# Patient Record
Sex: FEMALE | Race: BLACK OR AFRICAN AMERICAN | Employment: FULL TIME | URBAN - METROPOLITAN AREA
[De-identification: names, ages, dates, MRNs, and addresses within clinical notes are randomized per-mention and may not be internally consistent; named-entity substitution may affect disease eponyms.]

---

## 2017-06-03 ENCOUNTER — APPOINTMENT (OUTPATIENT)
Dept: GENERAL RADIOLOGY | Facility: HOSPITAL | Age: 37
End: 2017-06-03
Attending: EMERGENCY MEDICINE
Payer: COMMERCIAL

## 2017-06-03 ENCOUNTER — HOSPITAL ENCOUNTER (EMERGENCY)
Facility: HOSPITAL | Age: 37
Discharge: HOME | End: 2017-06-03
Attending: EMERGENCY MEDICINE
Payer: COMMERCIAL

## 2017-06-03 VITALS
HEIGHT: 61 IN | OXYGEN SATURATION: 97 % | DIASTOLIC BLOOD PRESSURE: 80 MMHG | BODY MASS INDEX: 30.96 KG/M2 | WEIGHT: 164 LBS | RESPIRATION RATE: 20 BRPM | HEART RATE: 86 BPM | SYSTOLIC BLOOD PRESSURE: 125 MMHG | TEMPERATURE: 99 F

## 2017-06-03 DIAGNOSIS — J40 BRONCHITIS: Primary | ICD-10-CM

## 2017-06-03 PROCEDURE — 99283 EMERGENCY DEPT VISIT LOW MDM: CPT

## 2017-06-03 PROCEDURE — 71020 XR CHEST PA + LAT CHEST (CPT=71020): CPT | Performed by: EMERGENCY MEDICINE

## 2017-06-03 RX ORDER — ALBUTEROL SULFATE 90 UG/1
2 AEROSOL, METERED RESPIRATORY (INHALATION) EVERY 4 HOURS PRN
Qty: 1 INHALER | Refills: 0 | Status: SHIPPED | OUTPATIENT
Start: 2017-06-03 | End: 2017-07-03

## 2017-06-03 RX ORDER — AMOXICILLIN 500 MG/1
500 TABLET, FILM COATED ORAL 3 TIMES DAILY
Qty: 30 TABLET | Refills: 0 | Status: SHIPPED | OUTPATIENT
Start: 2017-06-03 | End: 2017-06-13

## 2017-06-03 RX ORDER — CODEINE PHOSPHATE AND GUAIFENESIN 10; 100 MG/5ML; MG/5ML
10 SOLUTION ORAL EVERY 6 HOURS PRN
Qty: 263 ML | Refills: 0 | Status: SHIPPED | OUTPATIENT
Start: 2017-06-03 | End: 2017-06-08

## 2017-06-03 NOTE — ED NOTES
PT VERBALIZED COUGHING FOR 3 WEEKS , + YELLOW SPUTUM, \"ON AND OFF\" CHILLS. DIDN'T CHECK FOR FEVERS. PT TOOK MUSINEX AT 1700. DENIES NAUSEA OR VOMITING.

## 2017-06-03 NOTE — ED PROVIDER NOTES
Patient Seen in: [facility] AND [facility] Emergency Department    History   Patient presents with:  Cough/URI    Stated Complaint: Cough    HPI    3 weeks of productive cough. No fever but she has had chills. She is also feeling short of breath.   No relief wi Resp 20  Ht 154.9 cm (5' 1\")  Wt 74.39 kg  BMI 31.00 kg/m2  SpO2 97%  LMP 06/02/2017        Physical Exam   Constitutional: She is oriented to person, place, and time. She appears well-developed and well-nourished. No distress.    HENT:   Head: Normocephal 0    guaiFENesin-codeine (CHERATUSSIN AC) 100-10 MG/5ML Oral Solution  Take 10 mL by mouth every 6 (six) hours as needed for cough.   Qty: 263 mL Refills: 0

## 2018-04-15 ENCOUNTER — HOSPITAL ENCOUNTER (EMERGENCY)
Facility: HOSPITAL | Age: 38
Discharge: HOME | End: 2018-04-15
Attending: EMERGENCY MEDICINE
Payer: COMMERCIAL

## 2018-04-15 VITALS
DIASTOLIC BLOOD PRESSURE: 79 MMHG | TEMPERATURE: 98 F | SYSTOLIC BLOOD PRESSURE: 118 MMHG | WEIGHT: 164 LBS | HEIGHT: 61 IN | RESPIRATION RATE: 18 BRPM | BODY MASS INDEX: 30.96 KG/M2 | OXYGEN SATURATION: 100 % | HEART RATE: 84 BPM

## 2018-04-15 DIAGNOSIS — M79.10 MYALGIA: Primary | ICD-10-CM

## 2018-04-15 PROCEDURE — 99282 EMERGENCY DEPT VISIT SF MDM: CPT

## 2018-04-15 RX ORDER — IBUPROFEN 600 MG/1
600 TABLET ORAL EVERY 8 HOURS PRN
Qty: 30 TABLET | Refills: 0 | Status: SHIPPED | OUTPATIENT
Start: 2018-04-15 | End: 2018-04-22

## 2018-04-15 NOTE — ED NOTES
Patient reports all over body aching x3 weeks with no injury. Patient states this has been happening to her intermittently for a few years, she will get achy for weeks then it abruptly stops on its own without treatment.  Patient has never been worked up fo

## 2018-04-15 NOTE — ED PROVIDER NOTES
Patient Seen in: [facility] Emergency Department    History   Patient presents with:   Body ache and/or chills    Stated Complaint: Whole body aches x 3 weeks, no other complaints or symptoms    HPI    44year old female complains of years of episode Temp 98.1 °F (36.7 °C)   Resp 18   Ht 154.9 cm (5' 1\")   Wt 74.4 kg   SpO2 100%   BMI 30.99 kg/m²         Physical Exam   Constitutional: She is oriented to person, place, and time. She is cooperative. Non-toxic appearance.  She does not have a sickly shona 154.9 cm (5' 1\")     *I personally reviewed and interpreted all vitals.     MDM     Family history non contributory    Impression:  After history, physical and any above workup, the patient was found to have Myalgia  (primary encounter diagnosis)    Plan:

## 2018-07-26 ENCOUNTER — OFFICE VISIT (OUTPATIENT)
Dept: INTERNAL MEDICINE CLINIC | Facility: CLINIC | Age: 38
End: 2018-07-26
Payer: COMMERCIAL

## 2018-07-26 ENCOUNTER — HOSPITAL ENCOUNTER (OUTPATIENT)
Dept: GENERAL RADIOLOGY | Facility: HOSPITAL | Age: 38
Discharge: HOME | End: 2018-07-26
Attending: INTERNAL MEDICINE
Payer: COMMERCIAL

## 2018-07-26 VITALS
DIASTOLIC BLOOD PRESSURE: 82 MMHG | HEIGHT: 62 IN | TEMPERATURE: 99 F | BODY MASS INDEX: 30 KG/M2 | RESPIRATION RATE: 17 BRPM | SYSTOLIC BLOOD PRESSURE: 120 MMHG | HEART RATE: 86 BPM | OXYGEN SATURATION: 99 % | WEIGHT: 163 LBS

## 2018-07-26 DIAGNOSIS — M54.41 CHRONIC BILATERAL LOW BACK PAIN WITH BILATERAL SCIATICA: ICD-10-CM

## 2018-07-26 DIAGNOSIS — K21.9 GASTROESOPHAGEAL REFLUX DISEASE, ESOPHAGITIS PRESENCE NOT SPECIFIED: ICD-10-CM

## 2018-07-26 DIAGNOSIS — Z00.00 ROUTINE GENERAL MEDICAL EXAMINATION AT A HEALTH CARE FACILITY: Primary | ICD-10-CM

## 2018-07-26 DIAGNOSIS — E66.3 OVERWEIGHT (BMI 25.0-29.9): ICD-10-CM

## 2018-07-26 DIAGNOSIS — M54.42 CHRONIC BILATERAL LOW BACK PAIN WITH BILATERAL SCIATICA: ICD-10-CM

## 2018-07-26 DIAGNOSIS — G89.29 CHRONIC BILATERAL LOW BACK PAIN WITH BILATERAL SCIATICA: ICD-10-CM

## 2018-07-26 DIAGNOSIS — M25.50 DIFFUSE ARTHRALGIA: ICD-10-CM

## 2018-07-26 DIAGNOSIS — M25.50 ARTHRALGIA, UNSPECIFIED JOINT: ICD-10-CM

## 2018-07-26 LAB
ALBUMIN SERPL BCP-MCNC: 4.1 G/DL (ref 3.5–4.8)
ALBUMIN/GLOB SERPL: 1.1 {RATIO} (ref 1–2)
ALP SERPL-CCNC: 79 U/L (ref 32–100)
ALT SERPL-CCNC: 14 U/L (ref 14–54)
ANION GAP SERPL CALC-SCNC: 8 MMOL/L (ref 0–18)
APPEARANCE: CLEAR
AST SERPL-CCNC: 18 U/L (ref 15–41)
BASOPHILS # BLD: 0.1 K/UL (ref 0–0.2)
BASOPHILS NFR BLD: 1 %
BILIRUB SERPL-MCNC: 0.4 MG/DL (ref 0.3–1.2)
BILIRUBIN: NEGATIVE
BUN SERPL-MCNC: 8 MG/DL (ref 8–20)
BUN/CREAT SERPL: 10.1 (ref 10–20)
CALCIUM SERPL-MCNC: 9.3 MG/DL (ref 8.5–10.5)
CHLORIDE SERPL-SCNC: 106 MMOL/L (ref 95–110)
CK SERPL-CCNC: 115 U/L (ref 38–234)
CO2 SERPL-SCNC: 23 MMOL/L (ref 22–32)
CREAT SERPL-MCNC: 0.79 MG/DL (ref 0.5–1.5)
EOSINOPHIL # BLD: 0.1 K/UL (ref 0–0.7)
EOSINOPHIL NFR BLD: 1 %
ERYTHROCYTE [DISTWIDTH] IN BLOOD BY AUTOMATED COUNT: 13.8 % (ref 11–15)
ERYTHROCYTE [SEDIMENTATION RATE] IN BLOOD: 22 MM/HR (ref 0–20)
GLOBULIN PLAS-MCNC: 3.9 G/DL (ref 2.5–3.7)
GLUCOSE (URINE DIPSTICK): NEGATIVE MG/DL
GLUCOSE SERPL-MCNC: 80 MG/DL (ref 70–99)
HCT VFR BLD AUTO: 39.2 % (ref 35–48)
HGB BLD-MCNC: 12.6 G/DL (ref 12–16)
KETONES (URINE DIPSTICK): NEGATIVE MG/DL
LEUKOCYTES: NEGATIVE
LYMPHOCYTES # BLD: 2.1 K/UL (ref 1–4)
LYMPHOCYTES NFR BLD: 29 %
MCH RBC QN AUTO: 25.9 PG (ref 27–32)
MCHC RBC AUTO-ENTMCNC: 32.2 G/DL (ref 32–37)
MCV RBC AUTO: 80.4 FL (ref 80–100)
MONOCYTES # BLD: 0.5 K/UL (ref 0–1)
MONOCYTES NFR BLD: 7 %
NEUTROPHILS # BLD AUTO: 4.4 K/UL (ref 1.8–7.7)
NEUTROPHILS NFR BLD: 62 %
NITRITE, URINE: NEGATIVE
OCCULT BLOOD: NEGATIVE
OSMOLALITY UR CALC.SUM OF ELEC: 281 MOSM/KG (ref 275–295)
PATIENT FASTING: NO
PH, URINE: 6 (ref 4.5–8)
PLATELET # BLD AUTO: 357 K/UL (ref 140–400)
PMV BLD AUTO: 7.8 FL (ref 7.4–10.3)
POTASSIUM SERPL-SCNC: 3.8 MMOL/L (ref 3.3–5.1)
PROT SERPL-MCNC: 8 G/DL (ref 5.9–8.4)
PROTEIN (URINE DIPSTICK): NEGATIVE MG/DL
RBC # BLD AUTO: 4.88 M/UL (ref 3.7–5.4)
RHEUMATOID FACT SER QL: <5 IU/ML
SODIUM SERPL-SCNC: 137 MMOL/L (ref 136–144)
SPECIFIC GRAVITY: 1.02 (ref 1–1.03)
TSH SERPL-ACNC: 0.86 UIU/ML (ref 0.45–5.33)
URINE-COLOR: YELLOW
UROBILINOGEN,SEMI-QN: 0.2 MG/DL (ref 0–1.9)
VIT B12 SERPL-MCNC: 449 PG/ML (ref 181–914)
WBC # BLD AUTO: 7.2 K/UL (ref 4–11)

## 2018-07-26 PROCEDURE — 86431 RHEUMATOID FACTOR QUANT: CPT | Performed by: INTERNAL MEDICINE

## 2018-07-26 PROCEDURE — 82550 ASSAY OF CK (CPK): CPT | Performed by: INTERNAL MEDICINE

## 2018-07-26 PROCEDURE — 72100 X-RAY EXAM L-S SPINE 2/3 VWS: CPT | Performed by: INTERNAL MEDICINE

## 2018-07-26 PROCEDURE — 85652 RBC SED RATE AUTOMATED: CPT | Performed by: INTERNAL MEDICINE

## 2018-07-26 PROCEDURE — 82306 VITAMIN D 25 HYDROXY: CPT | Performed by: INTERNAL MEDICINE

## 2018-07-26 PROCEDURE — 36415 COLL VENOUS BLD VENIPUNCTURE: CPT | Performed by: INTERNAL MEDICINE

## 2018-07-26 PROCEDURE — 80050 GENERAL HEALTH PANEL: CPT | Performed by: INTERNAL MEDICINE

## 2018-07-26 PROCEDURE — 86038 ANTINUCLEAR ANTIBODIES: CPT | Performed by: INTERNAL MEDICINE

## 2018-07-26 PROCEDURE — 81000 URINALYSIS NONAUTO W/SCOPE: CPT | Performed by: INTERNAL MEDICINE

## 2018-07-26 PROCEDURE — 82607 VITAMIN B-12: CPT | Performed by: INTERNAL MEDICINE

## 2018-07-26 PROCEDURE — 99395 PREV VISIT EST AGE 18-39: CPT | Performed by: INTERNAL MEDICINE

## 2018-07-26 RX ORDER — MEDROXYPROGESTERONE ACETATE 150 MG/ML
150 INJECTION, SUSPENSION INTRAMUSCULAR
COMMUNITY
End: 2020-12-23

## 2018-07-26 RX ORDER — CELECOXIB 200 MG/1
200 CAPSULE ORAL DAILY
Qty: 10 CAPSULE | Refills: 0 | Status: SHIPPED | OUTPATIENT
Start: 2018-07-26 | End: 2018-12-15 | Stop reason: ALTCHOICE

## 2018-07-26 RX ORDER — FAMOTIDINE 40 MG/1
40 TABLET, FILM COATED ORAL DAILY
Qty: 30 TABLET | Refills: 0 | Status: SHIPPED | OUTPATIENT
Start: 2018-07-26 | End: 2018-12-15 | Stop reason: ALTCHOICE

## 2018-07-26 NOTE — PROGRESS NOTES
Nadia Ospina is a 43year old female. Patient presents with:  Physical: pt presents to clinic for Annual physical. last pap smear nov 2017 (Normal)       HPI:          Achiness whole body with aching last 3 mo. Had this achiness years tho on and off. Alcohol use: No                   Review of System:  CONSTITUTION: denies fevers,  chills, or sweats  HEENT: denies sore throat,  change in vision or hearing  CARDIOVASCULAR: denies chest pain, denies palpitations  RESPIRATORY: As HPI.   No congestion ARTHRITIS FACTOR, XR LUMBAR SPINE         (MIN 2 VIEWS) (CPT=72100), CK CREATINE KINASE         (NOT CREATININE), JEEVAN,DIRECT,REFLEX TITER +         SPECIFIC ANTIBODIES, CBC WITH DIFFERENTIAL WITH        PLATELET, COMP METABOLIC PANEL (14), ASSAY,         T

## 2018-07-26 NOTE — PROGRESS NOTES
Pt presented to clinic today for blood draw. Per physician able to draw orders. Orders  documented within chart. Pt tolerated lab draw well.  verified.   Orders drawn include: sed rate, rheumatoid, vit d, vit b12, tsh, cmp, cbc, sumanth, ck   Site of draw: r

## 2018-07-27 LAB — 25(OH)D3 SERPL-MCNC: 23.7 NG/ML

## 2018-07-30 LAB — NUCLEAR IGG TITR SER IF: NEGATIVE {TITER}

## 2018-08-01 ENCOUNTER — OFFICE VISIT (OUTPATIENT)
Dept: INTERNAL MEDICINE CLINIC | Facility: CLINIC | Age: 38
End: 2018-08-01
Payer: COMMERCIAL

## 2018-08-01 VITALS
HEART RATE: 85 BPM | WEIGHT: 164 LBS | BODY MASS INDEX: 30 KG/M2 | RESPIRATION RATE: 17 BRPM | DIASTOLIC BLOOD PRESSURE: 80 MMHG | SYSTOLIC BLOOD PRESSURE: 108 MMHG | TEMPERATURE: 99 F | OXYGEN SATURATION: 99 %

## 2018-08-01 DIAGNOSIS — M54.42 CHRONIC BILATERAL LOW BACK PAIN WITH BILATERAL SCIATICA: Primary | ICD-10-CM

## 2018-08-01 DIAGNOSIS — J45.20 MILD INTERMITTENT ASTHMA WITHOUT COMPLICATION: ICD-10-CM

## 2018-08-01 DIAGNOSIS — K21.9 GASTROESOPHAGEAL REFLUX DISEASE, ESOPHAGITIS PRESENCE NOT SPECIFIED: ICD-10-CM

## 2018-08-01 DIAGNOSIS — M25.50 DIFFUSE ARTHRALGIA: ICD-10-CM

## 2018-08-01 DIAGNOSIS — G89.29 CHRONIC BILATERAL LOW BACK PAIN WITH BILATERAL SCIATICA: Primary | ICD-10-CM

## 2018-08-01 DIAGNOSIS — R29.898 WEAKNESS OF FOOT, UNSPECIFIED LATERALITY: ICD-10-CM

## 2018-08-01 DIAGNOSIS — M54.41 CHRONIC BILATERAL LOW BACK PAIN WITH BILATERAL SCIATICA: Primary | ICD-10-CM

## 2018-08-01 DIAGNOSIS — E55.9 VITAMIN D DEFICIENCY: ICD-10-CM

## 2018-08-01 DIAGNOSIS — Z80.0 FAMILY HISTORY OF COLON CANCER: ICD-10-CM

## 2018-08-01 DIAGNOSIS — E04.9 THYROID ENLARGEMENT: ICD-10-CM

## 2018-08-01 DIAGNOSIS — R06.00 DYSPNEA ON EXERTION: ICD-10-CM

## 2018-08-01 PROCEDURE — 99214 OFFICE O/P EST MOD 30 MIN: CPT | Performed by: INTERNAL MEDICINE

## 2018-08-01 RX ORDER — TRAMADOL HYDROCHLORIDE 50 MG/1
25 TABLET ORAL NIGHTLY PRN
Qty: 7 TABLET | Refills: 0 | Status: SHIPPED | OUTPATIENT
Start: 2018-08-01 | End: 2018-12-15 | Stop reason: ALTCHOICE

## 2018-08-01 RX ORDER — ALBUTEROL SULFATE 90 UG/1
2 AEROSOL, METERED RESPIRATORY (INHALATION) EVERY 4 HOURS PRN
Qty: 1 INHALER | Refills: 3 | Status: SHIPPED | OUTPATIENT
Start: 2018-08-01

## 2018-08-01 RX ORDER — MEDROXYPROGESTERONE ACETATE 150 MG/ML
1 INJECTION, SUSPENSION INTRAMUSCULAR
COMMUNITY
End: 2018-08-01 | Stop reason: ALTCHOICE

## 2018-08-01 RX ORDER — TRAMADOL HYDROCHLORIDE 50 MG/1
25 TABLET ORAL EVERY 6 HOURS PRN
Qty: 7 TABLET | Refills: 0 | Status: SHIPPED | OUTPATIENT
Start: 2018-08-01 | End: 2018-08-01 | Stop reason: CLARIF

## 2018-08-01 NOTE — PATIENT INSTRUCTIONS
Eat 3 meals/day; include protein snacks between meals, avoid excessive carbs and sweets    Vitamin D3 2000 IU  3 times weekly  ASTHMA ACTION PLAN for Bita Altman     : 1980          Date: 2018    Jess Jovel MD  [address] [place] [address] if necessary) and a copy of the plan was given to the patient/caregiver. [] Asthma Action Plan reviewed with patient (and caregiver if necessary) on the phone and mailed copy to patient.          Physician Patient Caretaker (if necessary)   Sonja Coleman

## 2018-08-01 NOTE — PROGRESS NOTES
Glinda Apley is a 43year old female. Patient presents with:  Test Results: pt presents to clinic for follow up on test results       HPI:       ONLY ABLE TO  Take 1 Celebrex because even with first pill, it caused cramping of stomach.     Diffuse  Monika    Family History   Problem Relation Age of Onset   • Diabetes Mother    • hepatitis [OTHER] Mother      hepatitis C   • Diabetes Maternal Grandmother    • Heart Disorder Maternal Grandmother 40     atrial fibrillation, heart problems in cousins, strength grossly normal except bilateral weakness of feet noted, both dorsiflexion and plantarflexion.     Objectives:    Results for orders placed or performed in visit on 07/26/18  -CK CREATINE KINASE (NOT CREATININE)   Result Value Ref Range    38 Negative   LEUKOCYTES negative Negative   APPEARANCE clear Clear   URINE-COLOR yellow Yellow   PROTEIN (URINE DIPSTICK)  Negative/Trace mg/dL   BILIRUBIN  Negative   UROBILINOGEN,SEMI-QN  0.0 - 1.9 mg/dL   PH, URINE  4.5 - 8.0   SPECIFIC GRAVITY  1.005 - 1 radiating from her back to buttocks down to her legs.   To check MRI of lumbar spine    (M21.40) Weakness of foot, unspecified laterality  Comment: bilateral  Plan: MRI SPINE LUMBAR (CPT=72148)  Patient has history heavy lifting    (R06.09) Dyspnea on exert for Pain. Patient Instructions   Eat 3 meals/day; include protein snacks between meals, avoid excessive carbs and sweets    Vitamin D3 2000 IU  3 times weekly      Return in about 2 weeks (around 8/15/2018).         Becky Zuniga MD

## 2018-08-14 ENCOUNTER — HOSPITAL ENCOUNTER (OUTPATIENT)
Dept: ULTRASOUND IMAGING | Facility: HOSPITAL | Age: 38
Discharge: HOME | End: 2018-08-14
Attending: INTERNAL MEDICINE
Payer: COMMERCIAL

## 2018-08-14 ENCOUNTER — TELEPHONE (OUTPATIENT)
Dept: INTERNAL MEDICINE CLINIC | Facility: CLINIC | Age: 38
End: 2018-08-14

## 2018-08-14 ENCOUNTER — APPOINTMENT (OUTPATIENT)
Dept: LAB | Facility: HOSPITAL | Age: 38
End: 2018-08-14
Attending: INTERNAL MEDICINE
Payer: COMMERCIAL

## 2018-08-14 ENCOUNTER — HOSPITAL ENCOUNTER (OUTPATIENT)
Dept: MRI IMAGING | Facility: HOSPITAL | Age: 38
Discharge: HOME | End: 2018-08-14
Attending: INTERNAL MEDICINE
Payer: COMMERCIAL

## 2018-08-14 DIAGNOSIS — M79.10 MYALGIA: Primary | ICD-10-CM

## 2018-08-14 DIAGNOSIS — R29.898 WEAKNESS OF FOOT, UNSPECIFIED LATERALITY: ICD-10-CM

## 2018-08-14 DIAGNOSIS — R06.00 DYSPNEA ON EXERTION: ICD-10-CM

## 2018-08-14 DIAGNOSIS — E04.9 THYROID ENLARGEMENT: ICD-10-CM

## 2018-08-14 PROCEDURE — 93005 ELECTROCARDIOGRAM TRACING: CPT

## 2018-08-14 PROCEDURE — 72148 MRI LUMBAR SPINE W/O DYE: CPT | Performed by: INTERNAL MEDICINE

## 2018-08-14 PROCEDURE — 76536 US EXAM OF HEAD AND NECK: CPT | Performed by: INTERNAL MEDICINE

## 2018-08-14 PROCEDURE — 93010 ELECTROCARDIOGRAM REPORT: CPT | Performed by: INTERNAL MEDICINE

## 2018-08-18 ENCOUNTER — OFFICE VISIT (OUTPATIENT)
Dept: INTERNAL MEDICINE CLINIC | Facility: CLINIC | Age: 38
End: 2018-08-18
Payer: COMMERCIAL

## 2018-08-18 ENCOUNTER — TELEPHONE (OUTPATIENT)
Dept: INTERNAL MEDICINE CLINIC | Facility: CLINIC | Age: 38
End: 2018-08-18

## 2018-08-18 VITALS
SYSTOLIC BLOOD PRESSURE: 110 MMHG | DIASTOLIC BLOOD PRESSURE: 70 MMHG | HEART RATE: 80 BPM | OXYGEN SATURATION: 99 % | HEIGHT: 62 IN | RESPIRATION RATE: 18 BRPM | BODY MASS INDEX: 30 KG/M2 | TEMPERATURE: 98 F | WEIGHT: 163 LBS

## 2018-08-18 DIAGNOSIS — K21.9 GASTROESOPHAGEAL REFLUX DISEASE, ESOPHAGITIS PRESENCE NOT SPECIFIED: ICD-10-CM

## 2018-08-18 DIAGNOSIS — M54.16 LUMBAR RADICULOPATHY: Primary | ICD-10-CM

## 2018-08-18 DIAGNOSIS — M79.10 MYALGIA: Primary | ICD-10-CM

## 2018-08-18 DIAGNOSIS — M79.10 MYALGIA: ICD-10-CM

## 2018-08-18 DIAGNOSIS — E04.1 THYROID NODULE: ICD-10-CM

## 2018-08-18 DIAGNOSIS — E66.3 OVERWEIGHT (BMI 25.0-29.9): ICD-10-CM

## 2018-08-18 DIAGNOSIS — E55.9 VITAMIN D DEFICIENCY: ICD-10-CM

## 2018-08-18 LAB
APPEARANCE: CLEAR
APPEARANCE: CLEAR
BILIRUBIN: NEGATIVE
BILIRUBIN: NEGATIVE
GLUCOSE (URINE DIPSTICK): NEGATIVE MG/DL
KETONES (URINE DIPSTICK): NEGATIVE MG/DL
KETONES (URINE DIPSTICK): NEGATIVE MG/DL
NITRITE, URINE: NEGATIVE
NITRITE, URINE: NEGATIVE
OCCULT BLOOD: NEGATIVE
OCCULT BLOOD: NEGATIVE
PH, URINE: 5 (ref 4.5–8)
PH, URINE: 5 (ref 4.5–8)
PROTEIN (URINE DIPSTICK): NEGATIVE MG/DL
PROTEIN (URINE DIPSTICK): NEGATIVE MG/DL
SPECIFIC GRAVITY: 1 (ref 1–1.03)
SPECIFIC GRAVITY: 1 (ref 1–1.03)
URINE-COLOR: YELLOW
URINE-COLOR: YELLOW
UROBILINOGEN,SEMI-QN: 0.2 MG/DL (ref 0–1.9)
UROBILINOGEN,SEMI-QN: 0.2 MG/DL (ref 0–1.9)

## 2018-08-18 PROCEDURE — 87389 HIV-1 AG W/HIV-1&-2 AB AG IA: CPT | Performed by: INTERNAL MEDICINE

## 2018-08-18 PROCEDURE — 99214 OFFICE O/P EST MOD 30 MIN: CPT | Performed by: INTERNAL MEDICINE

## 2018-08-18 PROCEDURE — 86618 LYME DISEASE ANTIBODY: CPT | Performed by: INTERNAL MEDICINE

## 2018-08-18 PROCEDURE — 81000 URINALYSIS NONAUTO W/SCOPE: CPT | Performed by: INTERNAL MEDICINE

## 2018-08-18 PROCEDURE — 82306 VITAMIN D 25 HYDROXY: CPT | Performed by: INTERNAL MEDICINE

## 2018-08-18 PROCEDURE — 36415 COLL VENOUS BLD VENIPUNCTURE: CPT | Performed by: INTERNAL MEDICINE

## 2018-08-18 RX ORDER — HYDROCODONE BITARTRATE AND ACETAMINOPHEN 5; 325 MG/1; MG/1
1 TABLET ORAL EVERY 6 HOURS PRN
Qty: 7 TABLET | Refills: 0 | Status: SHIPPED | OUTPATIENT
Start: 2018-08-18 | End: 2018-09-29

## 2018-08-18 NOTE — PROGRESS NOTES
Pearl Perales is a 43year old female. Patient presents with: Follow - Up: pt presents to clinic to discuss MRI, US and xray       HPI:        Tramadol causes her to awaken 3-4 hours after going to sleep. After, can't go back to sleep.  New since Tram Disorder Maternal Grandmother 40     atrial fibrillation, heart problems in cousins, some who smoke   • hepatitis C [OTHER] Maternal Grandmother    • Cancer Other 36     breast cancer   • breast cancer [OTHER] Other      40's   • colon cancer [OTHER] Other GRAVITY 1.005 1.005 - 1.030   OCCULT BLOOD negative Negative   PH, URINE 5.0 4.5 - 8.0   PROTEIN (URINE DIPSTICK) negative Negative/Trace mg/dL   UROBILINOGEN,SEMI-QN 0.2 0.0 - 1.9 mg/dL   NITRITE, URINE negative Negative   LEUKOCYTES trace Negative   APPE abnormality, spinal stenosis, or foraminal stenosis. CONCLUSION:  1. Mild levoscoliosis and minimal lumbar spondylosis. 2. L3-4: Slight asymmetric disc bulge with far left lateral foraminal encroachment 3.  No other significant disc bulge or herniatio well-circumscribed ovoid 1.7 x 0.7 x 1.1 cm lymph node. CONCLUSION:  1. There is a 1.9 cm left paraisthmic hypoechoic solid nodule. This meet size criteria for biopsy, and correlation with fine needle aspiration is recommended.   2. A 1.7 cm left-side Tab 7 tablet 0      Sig: Take 1 tablet by mouth every 6 (six) hours as needed for Pain.  1/2 tablet at night  PRN severe painDo  nnot take nightlyMay take ectra tylenol or Aleve with this (with food)           Patient Instructions   F/U gyne      F/U after

## 2018-08-18 NOTE — PROGRESS NOTES
Pt presented to clinic today for blood draw. Per physician able to draw orders. Orders  documented within chart. Pt tolerated lab draw well.  verified.   Orders drawn include: vit d, hiv, lyme   Site of draw: rt heidi Del Rosario, CMA

## 2018-08-19 PROBLEM — E04.1 THYROID NODULE: Status: ACTIVE | Noted: 2018-08-19

## 2018-08-19 PROBLEM — M54.16 LUMBAR RADICULOPATHY: Status: ACTIVE | Noted: 2018-08-19

## 2018-08-20 LAB
25(OH)D3 SERPL-MCNC: 22.1 NG/ML
B BURGDOR IGG+IGM SER QL: NEGATIVE
HIV1+2 AB SERPL QL IA: NONREACTIVE

## 2018-08-21 ENCOUNTER — TELEPHONE (OUTPATIENT)
Dept: INTERNAL MEDICINE CLINIC | Facility: CLINIC | Age: 38
End: 2018-08-21

## 2018-08-21 NOTE — TELEPHONE ENCOUNTER
Patient called asking if office can just fax the [facility] prescription that she had forgotten on Saturday. Informed patient that this is one of those controlled substances that we cannot fax over and she needs to come and pick it up.   Patient asked what time

## 2018-08-28 ENCOUNTER — TELEPHONE (OUTPATIENT)
Dept: INTERNAL MEDICINE CLINIC | Facility: CLINIC | Age: 38
End: 2018-08-28

## 2018-08-28 DIAGNOSIS — E04.1 THYROID NODULE: Primary | ICD-10-CM

## 2018-08-28 NOTE — TELEPHONE ENCOUNTER
José Miguel Dotson from [address] [place] [address] scheduling called re: the referral for interventional radiology. States that it doesn't indicate \"biopsy order for thyroid nodule. \"  Please addend or create new order.   They do not know exactly what to do as it doesn't state in the ord

## 2018-08-30 ENCOUNTER — TELEPHONE (OUTPATIENT)
Dept: INTERNAL MEDICINE CLINIC | Facility: CLINIC | Age: 38
End: 2018-08-30

## 2018-08-31 ENCOUNTER — EXTERNAL RECORD (OUTPATIENT)
Dept: HEALTH INFORMATION MANAGEMENT | Facility: OTHER | Age: 38
End: 2018-08-31

## 2018-09-12 ENCOUNTER — HOSPITAL ENCOUNTER (OUTPATIENT)
Dept: ULTRASOUND IMAGING | Facility: HOSPITAL | Age: 38
Discharge: HOME | End: 2018-09-12
Attending: INTERNAL MEDICINE
Payer: COMMERCIAL

## 2018-09-12 VITALS
DIASTOLIC BLOOD PRESSURE: 88 MMHG | SYSTOLIC BLOOD PRESSURE: 130 MMHG | HEIGHT: 61 IN | HEART RATE: 75 BPM | WEIGHT: 165 LBS | BODY MASS INDEX: 31.15 KG/M2

## 2018-09-12 DIAGNOSIS — E04.1 THYROID NODULE: ICD-10-CM

## 2018-09-12 PROCEDURE — 88173 CYTOPATH EVAL FNA REPORT: CPT | Performed by: INTERNAL MEDICINE

## 2018-09-12 PROCEDURE — 76942 ECHO GUIDE FOR BIOPSY: CPT | Performed by: INTERNAL MEDICINE

## 2018-09-12 PROCEDURE — 88177 CYTP FNA EVAL EA ADDL: CPT | Performed by: INTERNAL MEDICINE

## 2018-09-12 PROCEDURE — 10022 US FNA THYROID (CPT=10022/76942): CPT | Performed by: INTERNAL MEDICINE

## 2018-09-12 PROCEDURE — 88172 CYTP DX EVAL FNA 1ST EA SITE: CPT | Performed by: INTERNAL MEDICINE

## 2018-09-12 NOTE — IMAGING NOTE
1000:  Miss Nichole Jason arrived to ultrasound room #4     1005:  Scans completed by ultrasound technologist,  Lala DRUMMOND TAKEN : Miss Nichole Jason reported that primary care physician discovered thyroid nodule during routine exam 8-19-18.   Denies smoking  Fam

## 2018-09-12 NOTE — PROCEDURES
[facility]  Procedure Note    Jolieraulito Robles Patient Status:  Outpatient    1980 MRN [id number]   Location [address] [facility] Attending Letha Bernal MD   Hosp Day # 0 PCP Frances Arellano MD     Proc

## 2018-09-14 ENCOUNTER — TELEPHONE (OUTPATIENT)
Dept: INTERNAL MEDICINE CLINIC | Facility: CLINIC | Age: 38
End: 2018-09-14

## 2018-09-14 NOTE — TELEPHONE ENCOUNTER
Patient called office-stated someone told her to repeat a test, although she did not understand. She had a thyroid biopsy, which was resulted today, and 9/14/2018. Advised her that biopsy results were benign.   I did not call her, possibly pathology bernabe

## 2018-09-29 ENCOUNTER — OFFICE VISIT (OUTPATIENT)
Dept: INTERNAL MEDICINE CLINIC | Facility: CLINIC | Age: 38
End: 2018-09-29
Payer: COMMERCIAL

## 2018-09-29 VITALS
BODY MASS INDEX: 31 KG/M2 | OXYGEN SATURATION: 99 % | RESPIRATION RATE: 17 BRPM | WEIGHT: 165 LBS | HEART RATE: 88 BPM | TEMPERATURE: 98 F | DIASTOLIC BLOOD PRESSURE: 80 MMHG | SYSTOLIC BLOOD PRESSURE: 110 MMHG

## 2018-09-29 DIAGNOSIS — E66.9 OBESITY (BMI 30.0-34.9): ICD-10-CM

## 2018-09-29 DIAGNOSIS — E55.9 VITAMIN D DEFICIENCY: ICD-10-CM

## 2018-09-29 DIAGNOSIS — E04.1 THYROID NODULE: ICD-10-CM

## 2018-09-29 DIAGNOSIS — R10.819 LEFT FLANK TENDERNESS: ICD-10-CM

## 2018-09-29 DIAGNOSIS — M54.16 LUMBAR RADICULOPATHY: Primary | ICD-10-CM

## 2018-09-29 PROCEDURE — 99214 OFFICE O/P EST MOD 30 MIN: CPT | Performed by: INTERNAL MEDICINE

## 2018-09-29 RX ORDER — HYDROCODONE BITARTRATE AND ACETAMINOPHEN 5; 325 MG/1; MG/1
0.5 TABLET ORAL NIGHTLY PRN
Qty: 7 TABLET | Refills: 0 | Status: SHIPPED | OUTPATIENT
Start: 2018-09-29 | End: 2018-12-15 | Stop reason: ALTCHOICE

## 2018-09-29 NOTE — PROGRESS NOTES
Lan Herrera is a 43year old female. Patient presents with: Follow - Up: pt presents to clinic for 1 month follow up       HPI:       Had thyroid biopsy-benign. Previously discussed this per phone with patient. Some days back better than others.  O Onset   • Diabetes Mother    • Other (hepatitis) Mother         hepatitis C   • Diabetes Maternal Grandmother    • Heart Disorder Maternal Grandmother 40        atrial fibrillation, heart problems in cousins, some who smoke   • Other (hepatitis C) Maternal [facility] Received:            09/12/2018 10:57 AM                                 Ultrasound                                                                   Pathologist:           [place] Rodas MD DESCRIPTION: The patient was informed of the nature of the procedure which included a discussion of the benefits and risks including, but not limited to, hemorrhage and infection.   After obtaining informed consent, an ultrasound fine needle aspiration biop showed mild left symmetric disc bulge L3-L4, with left lateral foraminal encroachment.   Patient advised physical therapy, though wishes to see neurology first.  She requested a few Norco.  Discussed addictive potential, and told her that I would give her a

## 2018-11-30 ENCOUNTER — APPOINTMENT (OUTPATIENT)
Dept: GENERAL RADIOLOGY | Facility: HOSPITAL | Age: 38
End: 2018-11-30
Payer: COMMERCIAL

## 2018-11-30 ENCOUNTER — HOSPITAL ENCOUNTER (EMERGENCY)
Facility: HOSPITAL | Age: 38
Discharge: HOME | End: 2018-11-30
Payer: COMMERCIAL

## 2018-11-30 VITALS
HEIGHT: 61 IN | HEART RATE: 97 BPM | RESPIRATION RATE: 18 BRPM | SYSTOLIC BLOOD PRESSURE: 135 MMHG | TEMPERATURE: 99 F | OXYGEN SATURATION: 97 % | WEIGHT: 165 LBS | BODY MASS INDEX: 31.15 KG/M2 | DIASTOLIC BLOOD PRESSURE: 76 MMHG

## 2018-11-30 DIAGNOSIS — S80.02XA CONTUSION OF LEFT KNEE, INITIAL ENCOUNTER: Primary | ICD-10-CM

## 2018-11-30 PROCEDURE — 73560 X-RAY EXAM OF KNEE 1 OR 2: CPT

## 2018-11-30 PROCEDURE — 99283 EMERGENCY DEPT VISIT LOW MDM: CPT

## 2018-11-30 RX ORDER — IBUPROFEN 600 MG/1
600 TABLET ORAL ONCE
Status: COMPLETED | OUTPATIENT
Start: 2018-11-30 | End: 2018-11-30

## 2018-11-30 NOTE — ED PROVIDER NOTES
Patient Seen in: [facility] AND [facility] Emergency Department    History   Patient presents with:  Fall (musculoskeletal, neurologic)    Stated Complaint:     HPI    65-year-old female with history of hypertension and esophageal reflux presents with complaints lower extremities. Skin: No laceration or abrasions.   Musculoskeletal                Head: atraumatic without scalp tenderness                Neck: The cervical spine is non-tender and there is no pain with active range of motion                Back: ther

## 2018-11-30 NOTE — ED INITIAL ASSESSMENT (HPI)
Pt came in for pain to left knee after slipping and falling on ice this morning. Denies hitting head or LOC. No blood thinners. RR even and nonlabored, speaking in full sentences, decreased ROM in LLE due to pain.

## 2018-12-04 ENCOUNTER — OFFICE VISIT (OUTPATIENT)
Dept: NEUROLOGY | Facility: CLINIC | Age: 38
End: 2018-12-04
Payer: COMMERCIAL

## 2018-12-04 VITALS
HEIGHT: 61 IN | SYSTOLIC BLOOD PRESSURE: 130 MMHG | HEART RATE: 88 BPM | WEIGHT: 165 LBS | BODY MASS INDEX: 31.15 KG/M2 | DIASTOLIC BLOOD PRESSURE: 74 MMHG

## 2018-12-04 DIAGNOSIS — M54.16 LUMBAR RADICULOPATHY: ICD-10-CM

## 2018-12-04 DIAGNOSIS — M48.062 SPINAL STENOSIS OF LUMBAR REGION WITH NEUROGENIC CLAUDICATION: Primary | ICD-10-CM

## 2018-12-04 PROCEDURE — 99244 OFF/OP CNSLTJ NEW/EST MOD 40: CPT | Performed by: OTHER

## 2018-12-04 RX ORDER — METHYLPREDNISOLONE 4 MG/1
TABLET ORAL
Qty: 1 PACKAGE | Refills: 0 | Status: SHIPPED | OUTPATIENT
Start: 2018-12-04 | End: 2018-12-15 | Stop reason: ALTCHOICE

## 2018-12-04 RX ORDER — AMITRIPTYLINE HYDROCHLORIDE 25 MG/1
TABLET, FILM COATED ORAL
Qty: 90 TABLET | Refills: 1 | Status: SHIPPED | OUTPATIENT
Start: 2018-12-04 | End: 2019-02-12

## 2018-12-04 NOTE — PROGRESS NOTES
Neurology Outpatient Consult Note    Tonio Clemente : 1980   Referring Physician: Dr. Jossy Warren  HPI:     Tonio Clemente is a 43year old female who is being seen in neurologic evaluation. Patient being seen in evaluation for low back pain.   Kristian Shi MG Oral Tab Take 1 tablet (40 mg total) by mouth daily. Disp: 30 tablet Rfl: 0   celecoxib (CELEBREX) 200 MG Oral Cap Take 1 capsule (200 mg total) by mouth daily.  With a meal Disp: 10 capsule Rfl: 0      Past Medical History:   Diagnosis Date   • Esophage 61\"   Wt 165 lb   BMI 31.18 kg/m²    General: no apparent distress, pleasant and cooperative   CV: regular rate and rhythm   Lungs: clear to auscultation bilaterally  Musculoskeletal: Positive straight leg raise at 30 degrees on right side (deferred on le risks/side effects discussed including dry mouth, sedation    –Medrol Dosepak    –We did discuss additional treatment options including referral to physical therapy, referral to physiatry; she prefers to hold off for time being, will let us know if she pérez

## 2018-12-04 NOTE — PATIENT INSTRUCTIONS
Back Pain (Acute or Chronic)    Back pain is one of the most common problems. The good news is that most people feel better in 1 to 2 weeks, and most of the rest in 1 to 2 months. Most people can remain active.   People who have pain describe it different Unless you had a physical injury (for example, a car accident or fall) X-rays are usually not needed for the initial evaluation of back pain. If pain continues and does not respond to medical treatment, X-rays and other tests may be needed.   Home care  Try Talk to your doctor before using medicine, especially if you have other medical problems or are taking other medicines. · You may use over-the-counter medicine as directed on the bottle to control pain, unless another pain medicine was prescribed.  If you Poor posture can cause pain. Too much slouching puts increased pressure on the disks. An excessive lumbar curve can overload and inflame the vertebrae. As a result, the back muscles may tighten or spasm to “splint” and protect the spine.  This adds to the p When you sit properly, pressure on your back is reduced. Try these steps:  · Sit so that the curve of your lower back fits easily against the chair. Keep your gaze level. · Support your feet.  They should be flat on the floor or on a footrest. Your knees s

## 2018-12-15 ENCOUNTER — OFFICE VISIT (OUTPATIENT)
Dept: INTERNAL MEDICINE CLINIC | Facility: CLINIC | Age: 38
End: 2018-12-15
Payer: COMMERCIAL

## 2018-12-15 VITALS
SYSTOLIC BLOOD PRESSURE: 104 MMHG | HEART RATE: 110 BPM | DIASTOLIC BLOOD PRESSURE: 72 MMHG | RESPIRATION RATE: 18 BRPM | OXYGEN SATURATION: 99 % | BODY MASS INDEX: 32 KG/M2 | WEIGHT: 168 LBS | TEMPERATURE: 98 F

## 2018-12-15 DIAGNOSIS — M25.562 ACUTE PAIN OF LEFT KNEE: Primary | ICD-10-CM

## 2018-12-15 PROCEDURE — 99212 OFFICE O/P EST SF 10 MIN: CPT | Performed by: INTERNAL MEDICINE

## 2018-12-15 RX ORDER — MELOXICAM 7.5 MG/1
7.5 TABLET ORAL DAILY
Qty: 8 TABLET | Refills: 0 | Status: SHIPPED | OUTPATIENT
Start: 2018-12-15 | End: 2019-04-19 | Stop reason: ALTCHOICE

## 2018-12-15 NOTE — PROGRESS NOTES
Jem Vergara is a 43year old female. Patient presents with:  ER F/U: pt presents to clinic for ER follow up. fell on ICE       HPI:         [place]  on ice 15 days ago, in parking lot onto left knee.   Terrence Oneil states she fell hard onto her left knee, and als pain  RESPIRATORY:no cough  MUSCULOSKELETAL: denies unusual joint pain, swelling or myalgias  SKIN: denies rash   NEUROLOGICAL: denies frequent headache  HEME: no bleeding      Physical Exam:   12/15/18  1129   BP: 104/72   Pulse: 110   Resp: 18   Temp: 98 required:  1        General Categorization Benign       Final Diagnosis:       Thyroid, left isthmus; ultrasound-guided fine needle aspiration:  Adequacy: Satisfactory for evaluation  General Category: Benign  Diagnosis:  · Benign follicular nodule  · No m (CPT=73721)        Pain and swelling of left knee after trauma approximately 2 weeks ago. No improvement per patient despite nonsteroidals. To check MRI left knee.             Imaging & Consults:  None    Meds & Refills for this Visit:   Requested Rene Castillo

## 2018-12-16 ENCOUNTER — HOSPITAL ENCOUNTER (OUTPATIENT)
Dept: MRI IMAGING | Facility: HOSPITAL | Age: 38
Discharge: HOME | End: 2018-12-16
Attending: INTERNAL MEDICINE
Payer: COMMERCIAL

## 2018-12-16 DIAGNOSIS — M25.562 ACUTE PAIN OF LEFT KNEE: ICD-10-CM

## 2018-12-16 PROCEDURE — 73721 MRI JNT OF LWR EXTRE W/O DYE: CPT | Performed by: INTERNAL MEDICINE

## 2018-12-17 ENCOUNTER — TELEPHONE (OUTPATIENT)
Dept: INTERNAL MEDICINE CLINIC | Facility: CLINIC | Age: 38
End: 2018-12-17

## 2018-12-17 DIAGNOSIS — S83.411A TEAR OF MEDIAL COLLATERAL LIGAMENT OF RIGHT KNEE, INITIAL ENCOUNTER: Primary | ICD-10-CM

## 2018-12-18 NOTE — TELEPHONE ENCOUNTER
Left message that T not did have a tear of her medial collateral ligament, and that she needs to follow-up with orthopedics. Advised meanwhile to avoid excessive activity, comfortable knee support if necessary.   Referral left for Blair Reina consult

## 2018-12-21 ENCOUNTER — OFFICE VISIT (OUTPATIENT)
Dept: ORTHOPEDICS CLINIC | Facility: CLINIC | Age: 38
End: 2018-12-21
Payer: COMMERCIAL

## 2018-12-21 DIAGNOSIS — S83.412A SPRAIN OF MEDIAL COLLATERAL LIGAMENT OF LEFT KNEE, INITIAL ENCOUNTER: Primary | ICD-10-CM

## 2018-12-21 PROCEDURE — 99243 OFF/OP CNSLTJ NEW/EST LOW 30: CPT | Performed by: ORTHOPAEDIC SURGERY

## 2018-12-21 PROCEDURE — 99212 OFFICE O/P EST SF 10 MIN: CPT | Performed by: ORTHOPAEDIC SURGERY

## 2018-12-21 NOTE — PROGRESS NOTES
12/21/2018  Aylayaneth Agapito  116/1980  43year old   female  Lamberto Walker MD    HPI:   Patient presents with:   Injury: Left knee - onset 12/4/18 when she fell on ice - was see by her PCP and has an MRI in the system and has x-rays also - still has pa Heart Disorder Maternal Grandmother 40        atrial fibrillation, heart problems in cousins, some who smoke   • Other (hepatitis C) Maternal Grandmother    • Cancer Other 40        breast cancer   • Other (breast cancer) Other         40's   • Other (colo patient was fitted with an MCL brace today. She was given a prescription for physical therapy. She will follow-up in 4 weeks for repeat evaluation. All of their questions were answered and they are in agreement with the treatment plan.

## 2018-12-28 ENCOUNTER — OFFICE VISIT (OUTPATIENT)
Dept: PHYSICAL THERAPY | Facility: HOSPITAL | Age: 38
End: 2018-12-28
Attending: ORTHOPAEDIC SURGERY
Payer: COMMERCIAL

## 2018-12-28 DIAGNOSIS — S83.412A SPRAIN OF MEDIAL COLLATERAL LIGAMENT OF LEFT KNEE, INITIAL ENCOUNTER: ICD-10-CM

## 2018-12-28 PROCEDURE — 97162 PT EVAL MOD COMPLEX 30 MIN: CPT

## 2018-12-28 PROCEDURE — 97110 THERAPEUTIC EXERCISES: CPT

## 2018-12-28 NOTE — PROGRESS NOTES
LOWER EXTREMITY EVALUATION:   Referring Physician: Dr. Tevin Espinosa  Diagnosis: Sprain of medial collateral ligament of left knee, initial encounter (B67.847J)     Date of Onset: 11/30/18 Date of Service: 12/28/2018     PATIENT SUMMARY   The patient is a 43 y/ and return to work without limitation     Precautions:  None     OBJECTIVE:   Observation:   Gait: Pt is ambulating with J-brace: significant loss of L knee flexion throughout swing phase with decrease WB through LLE   Palpation: TTP L peripatellar region Treatment will include: Manual Therapy; Therapeutic Exercises; Neuromuscular Re-education;  Therapeutic Activity; Gait Training; Electrical Stim; Patient education; Home exercise program instruction    Education or treatment limitation: None  [facility]

## 2019-01-02 ENCOUNTER — OFFICE VISIT (OUTPATIENT)
Dept: PHYSICAL THERAPY | Facility: HOSPITAL | Age: 39
End: 2019-01-02
Attending: ORTHOPAEDIC SURGERY
Payer: COMMERCIAL

## 2019-01-02 DIAGNOSIS — S83.412A SPRAIN OF MEDIAL COLLATERAL LIGAMENT OF LEFT KNEE, INITIAL ENCOUNTER: ICD-10-CM

## 2019-01-02 PROCEDURE — 97140 MANUAL THERAPY 1/> REGIONS: CPT

## 2019-01-02 PROCEDURE — 97110 THERAPEUTIC EXERCISES: CPT

## 2019-01-02 PROCEDURE — 97032 APPL MODALITY 1+ESTIM EA 15: CPT

## 2019-01-02 NOTE — PROGRESS NOTES
Diagnosis: Sprain of medial collateral ligament of left knee, initial encounter (Q20.947Q)        # of Visits: (12 per POC)   Insurance: [employer] PPO           Next MD visit: 1/19/18     Fall Risk: standard         Precautions:  (Lumbar DDD)  Medication Change will demonstrate proper lifting mechanics of a 25 lb box as well as carry the box > 250 ft necessary to return full duty at work       Plan:   Reduce pain and swelling; improve L knee functional ROM, patellar mobility, quad/hamstirng strength, and return t

## 2019-01-04 ENCOUNTER — OFFICE VISIT (OUTPATIENT)
Dept: PHYSICAL THERAPY | Facility: HOSPITAL | Age: 39
End: 2019-01-04
Attending: ORTHOPAEDIC SURGERY
Payer: COMMERCIAL

## 2019-01-04 DIAGNOSIS — S83.412A SPRAIN OF MEDIAL COLLATERAL LIGAMENT OF LEFT KNEE, INITIAL ENCOUNTER: ICD-10-CM

## 2019-01-04 PROCEDURE — 97140 MANUAL THERAPY 1/> REGIONS: CPT

## 2019-01-04 PROCEDURE — 97110 THERAPEUTIC EXERCISES: CPT

## 2019-01-04 NOTE — PROGRESS NOTES
Diagnosis: Sprain of medial collateral ligament of left knee, initial encounter (L26.873Z)        # of Visits: (12 per POC)   Insurance: [employer] PPO           Next MD visit: 1/19/18     Fall Risk: standard         Precautions:  (Lumbar DDD)  Medication Change patient will demonstrate proper lifting mechanics of a 25 lb box as well as carry the box > 250 ft necessary to return full duty at work       Plan:   Reduce pain and swelling; improve L knee functional ROM, patellar mobility, quad/hamstirng strength, and

## 2019-01-08 ENCOUNTER — APPOINTMENT (OUTPATIENT)
Dept: PHYSICAL THERAPY | Facility: HOSPITAL | Age: 39
End: 2019-01-08
Attending: ORTHOPAEDIC SURGERY
Payer: COMMERCIAL

## 2019-01-09 ENCOUNTER — OFFICE VISIT (OUTPATIENT)
Dept: PHYSICAL THERAPY | Facility: HOSPITAL | Age: 39
End: 2019-01-09
Attending: INTERNAL MEDICINE
Payer: COMMERCIAL

## 2019-01-09 PROCEDURE — 97140 MANUAL THERAPY 1/> REGIONS: CPT

## 2019-01-09 PROCEDURE — 97110 THERAPEUTIC EXERCISES: CPT

## 2019-01-09 NOTE — PROGRESS NOTES
Diagnosis: Sprain of medial collateral ligament of left knee, initial encounter (Y85.966B)        # of Visits: (12 per POC)   Insurance: [employer] PPO           Next MD visit: 1/19/18     Fall Risk: standard         Precautions:  (Lumbar DDD)  Medication Change symptoms during ADLs (I.e. LE dressing)      2) The patient will demonstrate 2/3 to 1 grade increase in L knee extension strength necessary to squat to  her son from the floor       3) The patient will demonstrate > 125 deg L knee flexion ROM necess

## 2019-01-11 ENCOUNTER — OFFICE VISIT (OUTPATIENT)
Dept: PHYSICAL THERAPY | Facility: HOSPITAL | Age: 39
End: 2019-01-11
Attending: ORTHOPAEDIC SURGERY
Payer: COMMERCIAL

## 2019-01-11 DIAGNOSIS — S83.412A SPRAIN OF MEDIAL COLLATERAL LIGAMENT OF LEFT KNEE, INITIAL ENCOUNTER: ICD-10-CM

## 2019-01-11 PROCEDURE — 97110 THERAPEUTIC EXERCISES: CPT

## 2019-01-11 PROCEDURE — 97140 MANUAL THERAPY 1/> REGIONS: CPT

## 2019-01-11 NOTE — PROGRESS NOTES
Diagnosis: Sprain of medial collateral ligament of left knee, initial encounter (H49.945F)        # of Visits: (12 per POC)   Insurance: [employer] PPO           Next MD visit: 1/19/18     Fall Risk: standard         Precautions:  (Lumbar DDD)  Medication Change secondary to pain with poor tolerance to activity progression. Minimal increase functional L knee flexion, increase with closed-chain activity.  Pt may benefit from further diagnostic testing an/or pain and swelling management          Goals:  1) The patien

## 2019-01-15 ENCOUNTER — OFFICE VISIT (OUTPATIENT)
Dept: PHYSICAL THERAPY | Facility: HOSPITAL | Age: 39
End: 2019-01-15
Attending: ORTHOPAEDIC SURGERY
Payer: COMMERCIAL

## 2019-01-15 DIAGNOSIS — S83.412A SPRAIN OF MEDIAL COLLATERAL LIGAMENT OF LEFT KNEE, INITIAL ENCOUNTER: ICD-10-CM

## 2019-01-15 PROCEDURE — 97110 THERAPEUTIC EXERCISES: CPT

## 2019-01-15 PROCEDURE — 97140 MANUAL THERAPY 1/> REGIONS: CPT

## 2019-01-15 NOTE — PROGRESS NOTES
Diagnosis: Sprain of medial collateral ligament of left knee, initial encounter (V54.505Y)        # of Visits: (12 per POC)   Insurance: [facility]           Next MD visit: 1/18/18 (Nichole Gipson),  1/19/19 PCP     Fall Risk: standard         Precautions:  (Lum towel roll 5\" hold 2 x 10   -SLR with QS x 10 (no assist)   -Seated heel slides on towel 3 x 10, then with self OP x 10  -Standing mini squat x 10  -Standing weight shift r/l, a/p x 60\" each  -Fwd step up 4\" x 10                                    Ice t

## 2019-01-17 ENCOUNTER — OFFICE VISIT (OUTPATIENT)
Dept: PHYSICAL THERAPY | Facility: HOSPITAL | Age: 39
End: 2019-01-17
Attending: ORTHOPAEDIC SURGERY
Payer: COMMERCIAL

## 2019-01-17 DIAGNOSIS — S83.412A SPRAIN OF MEDIAL COLLATERAL LIGAMENT OF LEFT KNEE, INITIAL ENCOUNTER: ICD-10-CM

## 2019-01-17 PROCEDURE — 97110 THERAPEUTIC EXERCISES: CPT

## 2019-01-17 NOTE — PROGRESS NOTES
Diagnosis: Sprain of medial collateral ligament of left knee, initial encounter (I85.742D)        # of Visits: (12 per POC)   Insurance: [employer]           Next MD visit: 1/18/18 (Nichole Gipson),  1/19/19 PCP     Fall Risk: standard         Precautions:  (Lum 10x    Supine L SLR (with assistance) 10x    Supine L abd (with assistance) 10x     L 2 in step up 10x     Standing TKE SB on wall 10 x 5 cts                TherEx:   -Supine heel slides with slide board 3 x 10   -Supine TKE's with towel roll 5\" hold 2 x work       Plan:   Reduce pain and swelling; improve L knee functional ROM, patellar mobility, quad/hamstirng strength, and return to functional activity. Await results of physician visit.      Charges: there ex x 3  Total Timed Treatment:  50 min     Total

## 2019-01-18 ENCOUNTER — OFFICE VISIT (OUTPATIENT)
Dept: ORTHOPEDICS CLINIC | Facility: CLINIC | Age: 39
End: 2019-01-18
Payer: COMMERCIAL

## 2019-01-18 DIAGNOSIS — S83.412A SPRAIN OF MEDIAL COLLATERAL LIGAMENT OF LEFT KNEE, INITIAL ENCOUNTER: Primary | ICD-10-CM

## 2019-01-18 DIAGNOSIS — M22.42 PATELLA, CHONDROMALACIA, LEFT: ICD-10-CM

## 2019-01-18 PROCEDURE — 99212 OFFICE O/P EST SF 10 MIN: CPT | Performed by: ORTHOPAEDIC SURGERY

## 2019-01-18 PROCEDURE — 99213 OFFICE O/P EST LOW 20 MIN: CPT | Performed by: ORTHOPAEDIC SURGERY

## 2019-01-18 NOTE — PROGRESS NOTES
This is a pleasant 39-year-old female with a previous diagnosis of a left MCL sprain. The patient has performed a course of physical therapy and worn her MCL brace. She continues to have pain in the left knee.   She reports that she recently had to jog ac

## 2019-01-22 ENCOUNTER — OFFICE VISIT (OUTPATIENT)
Dept: PHYSICAL THERAPY | Facility: HOSPITAL | Age: 39
End: 2019-01-22
Attending: ORTHOPAEDIC SURGERY
Payer: COMMERCIAL

## 2019-01-22 DIAGNOSIS — S83.412A SPRAIN OF MEDIAL COLLATERAL LIGAMENT OF LEFT KNEE, INITIAL ENCOUNTER: ICD-10-CM

## 2019-01-22 PROCEDURE — 97110 THERAPEUTIC EXERCISES: CPT

## 2019-01-22 PROCEDURE — 97140 MANUAL THERAPY 1/> REGIONS: CPT

## 2019-01-22 NOTE — PROGRESS NOTES
Diagnosis: Sprain of medial collateral ligament of left knee, initial encounter (N94.307F)        # of Visits: (12 per POC)   Insurance: [facility]           Next MD visit: 3/1/19 Valentino Jacobson),  1/19/19 PCP     Fall Risk: standard         Precautions:  (Mannyba towel squeeze 3 x 10                   All closed chain exercises performed with B UE support and SBA x 1    TherEx:   -NuStep seat 8, L1 UE/LE's for ROM x 10 min   -Standing knee flex stretch on 4 \"stair 15\" x 5   -Standing mini squat 2 x 10  -Fwd step

## 2019-01-29 ENCOUNTER — OFFICE VISIT (OUTPATIENT)
Dept: PHYSICAL THERAPY | Facility: HOSPITAL | Age: 39
End: 2019-01-29
Attending: INTERNAL MEDICINE
Payer: COMMERCIAL

## 2019-01-29 PROCEDURE — 97110 THERAPEUTIC EXERCISES: CPT

## 2019-01-29 PROCEDURE — 97014 ELECTRIC STIMULATION THERAPY: CPT

## 2019-01-29 NOTE — PROGRESS NOTES
Diagnosis: Sprain of medial collateral ligament of left knee, initial encounter (K12.200B)        # of Visits: (12 per POC)   Insurance: [facility]           Next MD visit: 3/1/19 Hernando Norris),  1/19/19 PCP     Fall Risk: standard         Precautions:  (Lumba seat 8 L 2 UE/LE's for ROM x 6 min  -Supine DKTC ball roll with self OP towel assist 2 x 30        Ice to L anterior knee x 10 min      HEP: ankle pumps, B standing PF, seated dangle         Modalities:  IFC static 0-150 Hz to anterior and medial L knee in

## 2019-02-01 ENCOUNTER — OFFICE VISIT (OUTPATIENT)
Dept: PHYSICAL THERAPY | Facility: HOSPITAL | Age: 39
End: 2019-02-01
Attending: ORTHOPAEDIC SURGERY
Payer: COMMERCIAL

## 2019-02-01 PROCEDURE — 97530 THERAPEUTIC ACTIVITIES: CPT

## 2019-02-01 PROCEDURE — 97110 THERAPEUTIC EXERCISES: CPT

## 2019-02-01 NOTE — PROGRESS NOTES
PROGRESS NOTE    Diagnosis: Sprain of medial collateral ligament of left knee, initial encounter (X16.632T)        # of Visits: (12) Insurance: [employer] PPO           Next MD visit: 2/15/19      Fall Risk: standard         Precautions:  (Lumbar DDD)  Medicatio #10  Date: 2/1/19   TherEx:   -NuStep seat 8, L1 UE/LE's for ROM x 10 min   -Standing knee flex stretch on 4 \"stair 15\" x 5   -Standing mini squat 2 x 10  -Fwd step up 4\" 2 x 10   -Supine SB DKTC ball roll with self OP towel assist 2 x 30  -Supine QS wi

## 2019-02-06 ENCOUNTER — OFFICE VISIT (OUTPATIENT)
Dept: PHYSICAL THERAPY | Facility: HOSPITAL | Age: 39
End: 2019-02-06
Attending: ORTHOPAEDIC SURGERY
Payer: COMMERCIAL

## 2019-02-06 PROCEDURE — 97110 THERAPEUTIC EXERCISES: CPT

## 2019-02-06 PROCEDURE — 97112 NEUROMUSCULAR REEDUCATION: CPT

## 2019-02-06 NOTE — PROGRESS NOTES
Diagnosis: Sprain of medial collateral ligament of left knee, initial encounter (P12.086G)        # of Visits: (12) Insurance: [employer] PPO           Next MD visit: 2/15/19     Fall Risk: standard         Precautions:  (Lumbar DDD)  Medication Changes since la dressing)      2) The patient will demonstrate 2/3 to 1 grade increase in L knee extension strength necessary to squat to  her son from the floor       3) The patient will demonstrate > 125 deg L knee flexion ROM necessary to negotiate stairs withou

## 2019-02-07 ENCOUNTER — HOSPITAL ENCOUNTER (EMERGENCY)
Facility: HOSPITAL | Age: 39
Discharge: HOME | End: 2019-02-07
Attending: EMERGENCY MEDICINE
Payer: COMMERCIAL

## 2019-02-07 VITALS
RESPIRATION RATE: 18 BRPM | TEMPERATURE: 98 F | HEART RATE: 100 BPM | BODY MASS INDEX: 32.1 KG/M2 | WEIGHT: 170 LBS | OXYGEN SATURATION: 99 % | DIASTOLIC BLOOD PRESSURE: 85 MMHG | SYSTOLIC BLOOD PRESSURE: 123 MMHG | HEIGHT: 61 IN

## 2019-02-07 DIAGNOSIS — H81.12 BENIGN PAROXYSMAL POSITIONAL VERTIGO OF LEFT EAR: Primary | ICD-10-CM

## 2019-02-07 PROCEDURE — 93010 ELECTROCARDIOGRAM REPORT: CPT | Performed by: EMERGENCY MEDICINE

## 2019-02-07 PROCEDURE — 93005 ELECTROCARDIOGRAM TRACING: CPT

## 2019-02-07 PROCEDURE — 99284 EMERGENCY DEPT VISIT MOD MDM: CPT

## 2019-02-07 RX ORDER — ONDANSETRON 4 MG/1
4 TABLET, ORALLY DISINTEGRATING ORAL EVERY 4 HOURS PRN
Qty: 10 TABLET | Refills: 0 | Status: SHIPPED | OUTPATIENT
Start: 2019-02-07 | End: 2019-02-14

## 2019-02-07 RX ORDER — ONDANSETRON 4 MG/1
4 TABLET, ORALLY DISINTEGRATING ORAL ONCE
Status: COMPLETED | OUTPATIENT
Start: 2019-02-07 | End: 2019-02-07

## 2019-02-07 RX ORDER — MECLIZINE HYDROCHLORIDE 25 MG/1
25 TABLET ORAL ONCE
Status: COMPLETED | OUTPATIENT
Start: 2019-02-07 | End: 2019-02-07

## 2019-02-07 RX ORDER — MECLIZINE HYDROCHLORIDE 25 MG/1
25 TABLET ORAL 3 TIMES DAILY PRN
Qty: 20 TABLET | Refills: 0 | Status: SHIPPED | OUTPATIENT
Start: 2019-02-07 | End: 2019-04-19 | Stop reason: ALTCHOICE

## 2019-02-07 NOTE — ED INITIAL ASSESSMENT (HPI)
C/o waking up this morning with dizziness + nausea. Pt denies cp, sob, vomiting or recent fevers. Pt denies recent illness but states it is difficult to hear out of her right ear as noises sound \"muffled\".  Pt ambulatory with steady gait, denies weakness

## 2019-02-07 NOTE — ED PROVIDER NOTES
Patient Seen in: [facility] AND [facility] Emergency Department    History   Patient presents with:  Dizziness (neurologic)    Stated Complaint:     HPI    26-year-old female with past medical history significant for GERD, high blood pressure, presents to the [facility] present. CV: s1s2+, RRR, no m/r/g, normal distal pulses  Pulmonary/Chest: CTA b/l with no rales, wheezes. No chest wall tenderness  Abdominal: Nontender. Nondistended. Soft. Bowel sounds are normal.   Back:   :    Musculoskeletal: Normal range of kiya

## 2019-02-08 ENCOUNTER — OFFICE VISIT (OUTPATIENT)
Dept: PHYSICAL THERAPY | Facility: HOSPITAL | Age: 39
End: 2019-02-08
Attending: ORTHOPAEDIC SURGERY
Payer: COMMERCIAL

## 2019-02-08 PROCEDURE — 97112 NEUROMUSCULAR REEDUCATION: CPT

## 2019-02-08 PROCEDURE — 97110 THERAPEUTIC EXERCISES: CPT

## 2019-02-08 NOTE — PROGRESS NOTES
Diagnosis: Sprain of medial collateral ligament of left knee, initial encounter (B77.447I)        # of Visits: (12) Insurance: [employer] PPO           Next MD visit: 2/15/19     Fall Risk: standard         Precautions:  (Lumbar DDD)  Medication Changes since la (L on aeromat) 3 directions / 10x  -High knee on aeromat 20x                        Manual Therapy:    0 min            Goals:  1) The patient will be safe and independent with a progressive HEP necessary to manage symptoms during ADLs (I.e. LE dressing)

## 2019-02-12 ENCOUNTER — OFFICE VISIT (OUTPATIENT)
Dept: PHYSICAL THERAPY | Facility: HOSPITAL | Age: 39
End: 2019-02-12
Attending: ORTHOPAEDIC SURGERY
Payer: COMMERCIAL

## 2019-02-12 PROCEDURE — 97110 THERAPEUTIC EXERCISES: CPT

## 2019-02-12 PROCEDURE — 97112 NEUROMUSCULAR REEDUCATION: CPT

## 2019-02-12 RX ORDER — AMITRIPTYLINE HYDROCHLORIDE 25 MG/1
TABLET, FILM COATED ORAL
Qty: 270 TABLET | Refills: 0 | Status: SHIPPED | OUTPATIENT
Start: 2019-02-12 | End: 2019-08-01

## 2019-02-12 NOTE — PROGRESS NOTES
Diagnosis: Sprain of medial collateral ligament of left knee, initial encounter (H24.133Q)        # of Visits: (12) Insurance: [employer] PPO           Next MD visit: 2/15/19     Fall Risk: standard         Precautions:  (Lumbar DDD)  Medication Changes since la overall by 50% but continues to have patellar pain.  Quadriceps tightness decreased following STM with                     Goals:  1) The patient will be safe and independent with a progressive HEP necessary to manage symptoms during ADLs (I.e.

## 2019-02-12 NOTE — TELEPHONE ENCOUNTER
Refill request for amitriptyline 25 mg, take 3 tabs at bedtime, #270, no refills    LOV: 12/4/18  NOV: 5/14/19

## 2019-02-15 ENCOUNTER — OFFICE VISIT (OUTPATIENT)
Dept: ORTHOPEDICS CLINIC | Facility: CLINIC | Age: 39
End: 2019-02-15
Payer: COMMERCIAL

## 2019-02-15 DIAGNOSIS — S83.412A SPRAIN OF MEDIAL COLLATERAL LIGAMENT OF LEFT KNEE, INITIAL ENCOUNTER: Primary | ICD-10-CM

## 2019-02-15 DIAGNOSIS — M22.42 PATELLA, CHONDROMALACIA, LEFT: ICD-10-CM

## 2019-02-15 PROCEDURE — 99212 OFFICE O/P EST SF 10 MIN: CPT | Performed by: ORTHOPAEDIC SURGERY

## 2019-02-15 PROCEDURE — 99213 OFFICE O/P EST LOW 20 MIN: CPT | Performed by: ORTHOPAEDIC SURGERY

## 2019-02-15 RX ORDER — METHYLPREDNISOLONE 4 MG/1
TABLET ORAL
Qty: 1 PACKAGE | Refills: 0 | Status: SHIPPED | OUTPATIENT
Start: 2019-02-15 | End: 2019-04-19 | Stop reason: ALTCHOICE

## 2019-02-15 NOTE — PROGRESS NOTES
This is a pleasant 43-year-old female with a previous diagnosis of a left MCL sprain and left patellofemoral chondromalacia. The patient reports that the MCL sprain has improved with physical therapy.   She continues to have pain over the anterior aspect o

## 2019-02-20 ENCOUNTER — OFFICE VISIT (OUTPATIENT)
Dept: PHYSICAL THERAPY | Facility: HOSPITAL | Age: 39
End: 2019-02-20
Attending: ORTHOPAEDIC SURGERY
Payer: COMMERCIAL

## 2019-02-20 PROCEDURE — 97110 THERAPEUTIC EXERCISES: CPT

## 2019-02-20 NOTE — PROGRESS NOTES
Diagnosis: Sprain of medial collateral ligament of left knee, initial encounter (W40.137V)        # of Visits: (12) Insurance: [employer] PPO           Next MD visit: 2/15/19     Fall Risk: standard         Precautions:  (Lumbar DDD)  Medication Changes since la towel squeeze 2 x 10 5\" h old  -Mini squat x 10  -SLR with QS 2 x 10            HEP: Prone quad stretch          Manual Therapy:        Assessment:   Pt reporting decreased pain at patella with taping to unload the fat pad and to correct tilt of inferior

## 2019-02-22 ENCOUNTER — OFFICE VISIT (OUTPATIENT)
Dept: PHYSICAL THERAPY | Facility: HOSPITAL | Age: 39
End: 2019-02-22
Attending: ORTHOPAEDIC SURGERY
Payer: COMMERCIAL

## 2019-02-22 PROCEDURE — 97110 THERAPEUTIC EXERCISES: CPT

## 2019-02-22 NOTE — PROGRESS NOTES
Diagnosis: Sprain of medial collateral ligament of left knee, initial encounter (T23.359L)        # of Visits: (12) Insurance: [employer] PPO           Next MD visit: 2/15/19     Fall Risk: standard         Precautions:  (Lumbar DDD)  Medication Changes since la (no effect on pain  However knee flexion improved from 95 to 109 deg)    Knee flexion in standing (lunging on step) x 10    Repeat knee flexion in seated x 10 (decreased knee pain    Step up on 4inch step x 20        HEP: Prone quad stretch  Seated knee fl

## 2019-02-26 ENCOUNTER — OFFICE VISIT (OUTPATIENT)
Dept: PHYSICAL THERAPY | Facility: HOSPITAL | Age: 39
End: 2019-02-26
Attending: ORTHOPAEDIC SURGERY
Payer: COMMERCIAL

## 2019-02-26 PROCEDURE — 97110 THERAPEUTIC EXERCISES: CPT

## 2019-02-26 NOTE — PROGRESS NOTES
Diagnosis: Sprain of medial collateral ligament of left knee, initial encounter (E30.357N)        # of Visits: (12) Insurance: [employer] PPO           Next MD visit: 2/15/19     Fall Risk: standard         Precautions:  (Lumbar DDD)  Medication Changes since la decreased pain and pressure.          Goals:  1) The patient will be safe and independent with a progressive HEP necessary to manage symptoms during ADLs (I.e. LE dressing)      2) The patient will demonstrate 2/3 to 1 grade increase in L knee extension str

## 2019-02-28 ENCOUNTER — OFFICE VISIT (OUTPATIENT)
Dept: PHYSICAL THERAPY | Facility: HOSPITAL | Age: 39
End: 2019-02-28
Attending: ORTHOPAEDIC SURGERY
Payer: COMMERCIAL

## 2019-02-28 PROCEDURE — 97110 THERAPEUTIC EXERCISES: CPT

## 2019-02-28 NOTE — PROGRESS NOTES
Diagnosis: Sprain of medial collateral ligament of left knee, initial encounter (F03.499L)        # of Visits: (12) Insurance: [employer] PPO           Next MD visit: 3/22/19     Fall Risk: standard         Precautions:  (Lumbar DDD)  Medication Changes since la patient will demonstrate 2/3 to 1 grade increase in L knee extension strength necessary to squat to  her son from the floor       3) The patient will demonstrate > 125 deg L knee flexion ROM necessary to negotiate stairs without UE support      4) T

## 2019-03-06 ENCOUNTER — APPOINTMENT (OUTPATIENT)
Dept: PHYSICAL THERAPY | Facility: HOSPITAL | Age: 39
End: 2019-03-06
Attending: ORTHOPAEDIC SURGERY
Payer: COMMERCIAL

## 2019-03-08 ENCOUNTER — OFFICE VISIT (OUTPATIENT)
Dept: PHYSICAL THERAPY | Facility: HOSPITAL | Age: 39
End: 2019-03-08
Attending: ORTHOPAEDIC SURGERY
Payer: COMMERCIAL

## 2019-03-08 PROCEDURE — 97110 THERAPEUTIC EXERCISES: CPT

## 2019-03-08 NOTE — PROGRESS NOTES
Diagnosis: Sprain of medial collateral ligament of left knee, initial encounter (W31.397N)        # of Visits: (12) Insurance: [employer] PPO           Next MD visit: 3/22/19     Fall Risk: standard         Precautions:  (Lumbar DDD)  Medication Changes since la necessary to squat to  her son from the floor       3) The patient will demonstrate > 125 deg L knee flexion ROM necessary to negotiate stairs without UE support      4) The patient will demonstrate proper lifting mechanics of a 25 lb box as well as

## 2019-03-12 ENCOUNTER — OFFICE VISIT (OUTPATIENT)
Dept: PHYSICAL THERAPY | Facility: HOSPITAL | Age: 39
End: 2019-03-12
Attending: ORTHOPAEDIC SURGERY
Payer: COMMERCIAL

## 2019-03-12 PROCEDURE — 97110 THERAPEUTIC EXERCISES: CPT

## 2019-03-12 NOTE — PROGRESS NOTES
Diagnosis: Sprain of medial collateral ligament of left knee, initial encounter (X30.212Q)        # of Visits: (12) Insurance: [employer] PPO           Next MD visit: 3/22/19     Fall Risk: standard         Precautions:  (Lumbar DDD)  Medication Changes since la decreased to 4/10 post session. Continued progression of trunk stabilization exercises. Updated HEP. Pain persists.              Goals:  1) The patient will be safe and independent with a progressive HEP necessary to manage symptoms during ADLs (I.e. LE d

## 2019-03-15 ENCOUNTER — OFFICE VISIT (OUTPATIENT)
Dept: PHYSICAL THERAPY | Facility: HOSPITAL | Age: 39
End: 2019-03-15
Attending: ORTHOPAEDIC SURGERY
Payer: COMMERCIAL

## 2019-03-15 PROCEDURE — 97110 THERAPEUTIC EXERCISES: CPT

## 2019-03-15 NOTE — PROGRESS NOTES
Diagnosis: Sprain of medial collateral ligament of left knee, initial encounter (B94.830V)        # of Visits: (12) Insurance: [employer] PPO           Next MD visit: 3/22/19     Fall Risk: standard         Precautions:  (Lumbar DDD)  Medication Changes since la 25  -Incline board L4 gastroc stgretch 30\" x 3   -GTB side stepping r/l x 20' each with tactiel cues to avoid compensation of lateral trunk sway  --Bridging with GTB lateral force 10\" x 15   -Prone TA brace with hip extension x 10  --Clam with TA brace x certify the need for these services furnished under this plan of treatment and while under my care.     X___________________________________________________ Date____________________     Certification From: 4/03/5684  To:6/15/2019

## 2019-03-19 ENCOUNTER — OFFICE VISIT (OUTPATIENT)
Dept: PHYSICAL THERAPY | Facility: HOSPITAL | Age: 39
End: 2019-03-19
Attending: ORTHOPAEDIC SURGERY
Payer: COMMERCIAL

## 2019-03-19 PROCEDURE — 97110 THERAPEUTIC EXERCISES: CPT

## 2019-03-19 NOTE — PROGRESS NOTES
Diagnosis: Sprain of medial collateral ligament of left knee, initial encounter (E54.374T)        # of Visits: (12) Insurance: [employer] PPO           Next MD visit: 3/22/19     Fall Risk: standard         Precautions:  (Lumbar DDD)  Medication Changes since la issued GTB            Assessment:   Pt reporting no significant relief from pain and feels that she has reached a plateau regarding her pain.   Pain is the limiting factor in pts progress Patient will see Dr. Aureliano Beverly on 3/22/19 to discuss plan of care as pa

## 2019-03-22 ENCOUNTER — APPOINTMENT (OUTPATIENT)
Dept: PHYSICAL THERAPY | Facility: HOSPITAL | Age: 39
End: 2019-03-22
Attending: ORTHOPAEDIC SURGERY
Payer: COMMERCIAL

## 2019-03-22 ENCOUNTER — OFFICE VISIT (OUTPATIENT)
Dept: ORTHOPEDICS CLINIC | Facility: CLINIC | Age: 39
End: 2019-03-22
Payer: COMMERCIAL

## 2019-03-22 VITALS — RESPIRATION RATE: 18 BRPM | SYSTOLIC BLOOD PRESSURE: 134 MMHG | HEART RATE: 97 BPM | DIASTOLIC BLOOD PRESSURE: 88 MMHG

## 2019-03-22 DIAGNOSIS — S83.412A SPRAIN OF MEDIAL COLLATERAL LIGAMENT OF LEFT KNEE, INITIAL ENCOUNTER: ICD-10-CM

## 2019-03-22 DIAGNOSIS — M22.42 PATELLA, CHONDROMALACIA, LEFT: Primary | ICD-10-CM

## 2019-03-22 PROCEDURE — 20610 DRAIN/INJ JOINT/BURSA W/O US: CPT | Performed by: ORTHOPAEDIC SURGERY

## 2019-03-22 PROCEDURE — 99212 OFFICE O/P EST SF 10 MIN: CPT | Performed by: ORTHOPAEDIC SURGERY

## 2019-03-22 PROCEDURE — 99213 OFFICE O/P EST LOW 20 MIN: CPT | Performed by: ORTHOPAEDIC SURGERY

## 2019-03-22 NOTE — PROGRESS NOTES
Per verbal order from [facility], draw up 4ml of 1% lidocaine and 2ml of Kenalog 10 for cortisone injection to left knee.  Allison Lopez

## 2019-03-22 NOTE — PROGRESS NOTES
This is a pleasant 62-year-old female with a previous diagnosis of a left MCL sprain. The patient also has patellofemoral chondromalacia. Patient was given a Medrol Dosepak at the last visit and reports that her swelling in the left knee has improved.   H

## 2019-03-29 ENCOUNTER — APPOINTMENT (OUTPATIENT)
Dept: PHYSICAL THERAPY | Facility: HOSPITAL | Age: 39
End: 2019-03-29
Attending: ORTHOPAEDIC SURGERY
Payer: COMMERCIAL

## 2019-04-02 ENCOUNTER — APPOINTMENT (OUTPATIENT)
Dept: PHYSICAL THERAPY | Facility: HOSPITAL | Age: 39
End: 2019-04-02
Attending: ORTHOPAEDIC SURGERY
Payer: COMMERCIAL

## 2019-04-08 ENCOUNTER — APPOINTMENT (OUTPATIENT)
Dept: PHYSICAL THERAPY | Facility: HOSPITAL | Age: 39
End: 2019-04-08
Attending: ORTHOPAEDIC SURGERY
Payer: COMMERCIAL

## 2019-04-19 ENCOUNTER — OFFICE VISIT (OUTPATIENT)
Dept: ORTHOPEDICS CLINIC | Facility: CLINIC | Age: 39
End: 2019-04-19
Payer: COMMERCIAL

## 2019-04-19 DIAGNOSIS — M25.562 ACUTE PAIN OF LEFT KNEE: Primary | ICD-10-CM

## 2019-04-19 PROCEDURE — 99213 OFFICE O/P EST LOW 20 MIN: CPT | Performed by: ORTHOPAEDIC SURGERY

## 2019-04-19 PROCEDURE — 99212 OFFICE O/P EST SF 10 MIN: CPT | Performed by: ORTHOPAEDIC SURGERY

## 2019-04-19 NOTE — H&P
4/19/2019  Miroslava Vicky  116/1980  43year old   female  Tawanda Matthew MD    HPI:   Patient presents with:  Knee Pain: F/u left MCL sprain. Cortisone injection given on 3/22/19. She stts that injection did not work.   Stts pain is now radiating do Use      Smoking status: Never Smoker      Smokeless tobacco: Never Used    Alcohol use: No      Alcohol/week: 0.0 oz    Drug use: No          REVIEW OF SYSTEMS:   A 12 point review of systems was performed as documented on the intake form and reviewed by are not limited to infection, bleeding, neurovascular injury, need for further surgery, development of deep vein thrombosis, pulmonary emboli, and anesthesia problems. The patient understands the risks and wishes to proceed with surgery.        All of their

## 2019-04-19 NOTE — H&P (VIEW-ONLY)
4/19/2019  Cedric Phoenix  116/1980  43year old   female  Jess Alvarado MD    HPI:   Patient presents with:  Knee Pain: F/u left MCL sprain. Cortisone injection given on 3/22/19. She stts that injection did not work.   Stts pain is now radiating do Use      Smoking status: Never Smoker      Smokeless tobacco: Never Used    Alcohol use: No      Alcohol/week: 0.0 oz    Drug use: No          REVIEW OF SYSTEMS:   A 12 point review of systems was performed as documented on the intake form and reviewed by are not limited to infection, bleeding, neurovascular injury, need for further surgery, development of deep vein thrombosis, pulmonary emboli, and anesthesia problems. The patient understands the risks and wishes to proceed with surgery.        All of their

## 2019-05-07 ENCOUNTER — TELEPHONE (OUTPATIENT)
Dept: ORTHOPEDICS CLINIC | Facility: CLINIC | Age: 39
End: 2019-05-07

## 2019-05-07 NOTE — TELEPHONE ENCOUNTER
Pt brought FMLA Forms to  [facility] for Dr Crystal Cortes to complete. Pt signed HIPPA and pd $25 fee. Scanned to LEN and brought originals to LEN @ [facility].  Pt sx date is 5/15/19

## 2019-05-08 NOTE — TELEPHONE ENCOUNTER
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 Logged for processing.  NK

## 2019-05-13 RX ORDER — AMITRIPTYLINE HYDROCHLORIDE 25 MG/1
TABLET, FILM COATED ORAL
Qty: 270 TABLET | Refills: 3 | OUTPATIENT
Start: 2019-05-13

## 2019-05-13 NOTE — TELEPHONE ENCOUNTER
Patient has a follow-up appointment tomorrow, 5/14/19. no abdominal pain, no bloating, no constipation, no diarrhea, no nausea and no vomiting.

## 2019-05-13 NOTE — TELEPHONE ENCOUNTER
Dr. Florentin Peterson,    Please sign off on form:  -Highlight the patient and hit \"Chart\" button. -In Chart Review, w/in the Encounter tab - click 1 time on the Telephone call encounter for 5/7/19. Scroll down the telephone encounter.  -Click \"scan on\" blue Hyperlink under \"Media\" heading for FMLA Dr. Florentin Peterson 5/7/19  w/in the telephone enc.  -Click on Acknowledge button at the bottom right corner and left-click onto image, signature stamp appears and drag signature to Provider signature line. Stamp will turn blue. Close window.     Thank you,    Rip Mason

## 2019-05-15 ENCOUNTER — ANESTHESIA (OUTPATIENT)
Dept: SURGERY | Facility: HOSPITAL | Age: 39
End: 2019-05-15
Payer: COMMERCIAL

## 2019-05-15 ENCOUNTER — ANESTHESIA EVENT (OUTPATIENT)
Dept: SURGERY | Facility: HOSPITAL | Age: 39
End: 2019-05-15
Payer: COMMERCIAL

## 2019-05-15 ENCOUNTER — HOSPITAL ENCOUNTER (OUTPATIENT)
Facility: HOSPITAL | Age: 39
Setting detail: HOSPITAL OUTPATIENT SURGERY
Discharge: HOME | End: 2019-05-15
Attending: ORTHOPAEDIC SURGERY | Admitting: ORTHOPAEDIC SURGERY
Payer: COMMERCIAL

## 2019-05-15 VITALS
HEIGHT: 61 IN | BODY MASS INDEX: 33.07 KG/M2 | OXYGEN SATURATION: 97 % | TEMPERATURE: 97 F | RESPIRATION RATE: 16 BRPM | DIASTOLIC BLOOD PRESSURE: 89 MMHG | HEART RATE: 92 BPM | WEIGHT: 175.19 LBS | SYSTOLIC BLOOD PRESSURE: 138 MMHG

## 2019-05-15 DIAGNOSIS — M25.562 ACUTE PAIN OF LEFT KNEE: ICD-10-CM

## 2019-05-15 DIAGNOSIS — S83.242A TEAR OF MEDIAL MENISCUS OF LEFT KNEE, CURRENT, UNSPECIFIED TEAR TYPE, INITIAL ENCOUNTER: Primary | ICD-10-CM

## 2019-05-15 PROCEDURE — 81025 URINE PREGNANCY TEST: CPT

## 2019-05-15 PROCEDURE — 0SQD4ZZ REPAIR LEFT KNEE JOINT, PERCUTANEOUS ENDOSCOPIC APPROACH: ICD-10-PCS | Performed by: ORTHOPAEDIC SURGERY

## 2019-05-15 RX ORDER — MORPHINE SULFATE 10 MG/ML
6 INJECTION, SOLUTION INTRAMUSCULAR; INTRAVENOUS EVERY 10 MIN PRN
Status: DISCONTINUED | OUTPATIENT
Start: 2019-05-15 | End: 2019-05-15

## 2019-05-15 RX ORDER — CEFAZOLIN SODIUM/WATER 2 G/20 ML
2 SYRINGE (ML) INTRAVENOUS ONCE
Status: COMPLETED | OUTPATIENT
Start: 2019-05-15 | End: 2019-05-15

## 2019-05-15 RX ORDER — ONDANSETRON 2 MG/ML
4 INJECTION INTRAMUSCULAR; INTRAVENOUS ONCE AS NEEDED
Status: DISCONTINUED | OUTPATIENT
Start: 2019-05-15 | End: 2019-05-15

## 2019-05-15 RX ORDER — FAMOTIDINE 20 MG/1
20 TABLET ORAL ONCE
Status: COMPLETED | OUTPATIENT
Start: 2019-05-15 | End: 2019-05-15

## 2019-05-15 RX ORDER — METOPROLOL TARTRATE 5 MG/5ML
INJECTION INTRAVENOUS AS NEEDED
Status: DISCONTINUED | OUTPATIENT
Start: 2019-05-15 | End: 2019-05-15 | Stop reason: SURG

## 2019-05-15 RX ORDER — METOCLOPRAMIDE 10 MG/1
10 TABLET ORAL ONCE
Status: DISCONTINUED | OUTPATIENT
Start: 2019-05-15 | End: 2019-05-15 | Stop reason: HOSPADM

## 2019-05-15 RX ORDER — HYDROCODONE BITARTRATE AND ACETAMINOPHEN 5; 325 MG/1; MG/1
2 TABLET ORAL AS NEEDED
Status: DISCONTINUED | OUTPATIENT
Start: 2019-05-15 | End: 2019-05-15

## 2019-05-15 RX ORDER — ONDANSETRON 2 MG/ML
INJECTION INTRAMUSCULAR; INTRAVENOUS AS NEEDED
Status: DISCONTINUED | OUTPATIENT
Start: 2019-05-15 | End: 2019-05-15 | Stop reason: SURG

## 2019-05-15 RX ORDER — PROCHLORPERAZINE EDISYLATE 5 MG/ML
5 INJECTION INTRAMUSCULAR; INTRAVENOUS ONCE AS NEEDED
Status: DISCONTINUED | OUTPATIENT
Start: 2019-05-15 | End: 2019-05-15

## 2019-05-15 RX ORDER — SODIUM CHLORIDE, SODIUM LACTATE, POTASSIUM CHLORIDE, CALCIUM CHLORIDE 600; 310; 30; 20 MG/100ML; MG/100ML; MG/100ML; MG/100ML
INJECTION, SOLUTION INTRAVENOUS CONTINUOUS
Status: DISCONTINUED | OUTPATIENT
Start: 2019-05-15 | End: 2019-05-15

## 2019-05-15 RX ORDER — ONDANSETRON 2 MG/ML
4 INJECTION INTRAMUSCULAR; INTRAVENOUS EVERY 6 HOURS PRN
Status: CANCELLED | OUTPATIENT
Start: 2019-05-15

## 2019-05-15 RX ORDER — HYDROMORPHONE HYDROCHLORIDE 1 MG/ML
0.4 INJECTION, SOLUTION INTRAMUSCULAR; INTRAVENOUS; SUBCUTANEOUS EVERY 5 MIN PRN
Status: DISCONTINUED | OUTPATIENT
Start: 2019-05-15 | End: 2019-05-15

## 2019-05-15 RX ORDER — LIDOCAINE HYDROCHLORIDE 10 MG/ML
INJECTION, SOLUTION EPIDURAL; INFILTRATION; INTRACAUDAL; PERINEURAL AS NEEDED
Status: DISCONTINUED | OUTPATIENT
Start: 2019-05-15 | End: 2019-05-15 | Stop reason: SURG

## 2019-05-15 RX ORDER — HYDROMORPHONE HYDROCHLORIDE 1 MG/ML
0.6 INJECTION, SOLUTION INTRAMUSCULAR; INTRAVENOUS; SUBCUTANEOUS EVERY 5 MIN PRN
Status: DISCONTINUED | OUTPATIENT
Start: 2019-05-15 | End: 2019-05-15

## 2019-05-15 RX ORDER — MIDAZOLAM HYDROCHLORIDE 1 MG/ML
INJECTION INTRAMUSCULAR; INTRAVENOUS AS NEEDED
Status: DISCONTINUED | OUTPATIENT
Start: 2019-05-15 | End: 2019-05-15 | Stop reason: SURG

## 2019-05-15 RX ORDER — HYDROCODONE BITARTRATE AND ACETAMINOPHEN 5; 325 MG/1; MG/1
1-2 TABLET ORAL EVERY 6 HOURS PRN
Qty: 30 TABLET | Refills: 0 | Status: SHIPPED | OUTPATIENT
Start: 2019-05-15 | End: 2019-06-05 | Stop reason: ALTCHOICE

## 2019-05-15 RX ORDER — ASPIRIN 325 MG
325 TABLET ORAL DAILY
Qty: 14 TABLET | Refills: 0 | Status: SHIPPED | OUTPATIENT
Start: 2019-05-15 | End: 2019-05-31

## 2019-05-15 RX ORDER — HYDROCODONE BITARTRATE AND ACETAMINOPHEN 5; 325 MG/1; MG/1
1 TABLET ORAL AS NEEDED
Status: DISCONTINUED | OUTPATIENT
Start: 2019-05-15 | End: 2019-05-15

## 2019-05-15 RX ORDER — MORPHINE SULFATE 4 MG/ML
4 INJECTION, SOLUTION INTRAMUSCULAR; INTRAVENOUS EVERY 10 MIN PRN
Status: DISCONTINUED | OUTPATIENT
Start: 2019-05-15 | End: 2019-05-15

## 2019-05-15 RX ORDER — BUPIVACAINE HYDROCHLORIDE AND EPINEPHRINE 2.5; 5 MG/ML; UG/ML
INJECTION, SOLUTION INFILTRATION; PERINEURAL AS NEEDED
Status: DISCONTINUED | OUTPATIENT
Start: 2019-05-15 | End: 2019-05-15 | Stop reason: HOSPADM

## 2019-05-15 RX ORDER — NALOXONE HYDROCHLORIDE 0.4 MG/ML
80 INJECTION, SOLUTION INTRAMUSCULAR; INTRAVENOUS; SUBCUTANEOUS AS NEEDED
Status: DISCONTINUED | OUTPATIENT
Start: 2019-05-15 | End: 2019-05-15

## 2019-05-15 RX ORDER — MORPHINE SULFATE 4 MG/ML
2 INJECTION, SOLUTION INTRAMUSCULAR; INTRAVENOUS EVERY 10 MIN PRN
Status: DISCONTINUED | OUTPATIENT
Start: 2019-05-15 | End: 2019-05-15

## 2019-05-15 RX ORDER — ACETAMINOPHEN 500 MG
1000 TABLET ORAL ONCE
Status: COMPLETED | OUTPATIENT
Start: 2019-05-15 | End: 2019-05-15

## 2019-05-15 RX ORDER — DEXAMETHASONE SODIUM PHOSPHATE 4 MG/ML
VIAL (ML) INJECTION AS NEEDED
Status: DISCONTINUED | OUTPATIENT
Start: 2019-05-15 | End: 2019-05-15 | Stop reason: SURG

## 2019-05-15 RX ORDER — HYDROMORPHONE HYDROCHLORIDE 1 MG/ML
0.2 INJECTION, SOLUTION INTRAMUSCULAR; INTRAVENOUS; SUBCUTANEOUS EVERY 5 MIN PRN
Status: DISCONTINUED | OUTPATIENT
Start: 2019-05-15 | End: 2019-05-15

## 2019-05-15 RX ADMIN — METOPROLOL TARTRATE 1 MG: 5 INJECTION INTRAVENOUS at 13:33:00

## 2019-05-15 RX ADMIN — ONDANSETRON 4 MG: 2 INJECTION INTRAMUSCULAR; INTRAVENOUS at 13:39:00

## 2019-05-15 RX ADMIN — SODIUM CHLORIDE, SODIUM LACTATE, POTASSIUM CHLORIDE, CALCIUM CHLORIDE: 600; 310; 30; 20 INJECTION, SOLUTION INTRAVENOUS at 13:23:00

## 2019-05-15 RX ADMIN — CEFAZOLIN SODIUM/WATER 2 G: 2 G/20 ML SYRINGE (ML) INTRAVENOUS at 13:39:00

## 2019-05-15 RX ADMIN — MIDAZOLAM HYDROCHLORIDE 2 MG: 1 INJECTION INTRAMUSCULAR; INTRAVENOUS at 13:23:00

## 2019-05-15 RX ADMIN — SODIUM CHLORIDE, SODIUM LACTATE, POTASSIUM CHLORIDE, CALCIUM CHLORIDE: 600; 310; 30; 20 INJECTION, SOLUTION INTRAVENOUS at 13:24:00

## 2019-05-15 RX ADMIN — LIDOCAINE HYDROCHLORIDE 50 MG: 10 INJECTION, SOLUTION EPIDURAL; INFILTRATION; INTRACAUDAL; PERINEURAL at 13:30:00

## 2019-05-15 RX ADMIN — SODIUM CHLORIDE, SODIUM LACTATE, POTASSIUM CHLORIDE, CALCIUM CHLORIDE: 600; 310; 30; 20 INJECTION, SOLUTION INTRAVENOUS at 14:23:00

## 2019-05-15 RX ADMIN — METOPROLOL TARTRATE 1 MG: 5 INJECTION INTRAVENOUS at 13:42:00

## 2019-05-15 RX ADMIN — DEXAMETHASONE SODIUM PHOSPHATE 4 MG: 4 MG/ML VIAL (ML) INJECTION at 13:39:00

## 2019-05-15 RX ADMIN — METOPROLOL TARTRATE 1 MG: 5 INJECTION INTRAVENOUS at 13:36:00

## 2019-05-15 RX ADMIN — METOPROLOL TARTRATE 1 MG: 5 INJECTION INTRAVENOUS at 13:39:00

## 2019-05-15 NOTE — ANESTHESIA PREPROCEDURE EVALUATION
Anesthesia PreOp Note    HPI:     Maury Ascencio is a 35year old female who presents for preoperative consultation requested by: Partha Rodriguez MD    Date of Surgery: 5/15/2019    Procedure(s):  KNEE ARTHROSCOPY  Indication: Acute pain of left knee No current [facility]-ordered outpatient medications on file.     No Known Allergies    Family History   Problem Relation Age of Onset   • Diabetes Mother    • Other (hepatitis) Mother         hepatitis C   • Diabetes Maternal Grandmother    • Heart Disorder Caffeine Concern: Yes          Coffee: 1 cup daily        Exercise: No        Seat Belt: Not Asked        Special Diet: Not Asked        Stress Concern: Not Asked        Weight Concern: Not Asked         Service: Not Asked        Blood Transfusions the nature of the anesthetic plan, benefits, risks including possible dental damage if relevant, major complications, and any alternative forms of anesthetic management. All of the patient's questions were answered to the best of my ability.  The patient

## 2019-05-15 NOTE — ANESTHESIA POSTPROCEDURE EVALUATION
Patient: Hiren Clark    Procedure Summary     Date:  05/15/19 Room / Location:  [facility] OR  / [facility] OR    Anesthesia Start:  8847 Anesthesia Stop:  5966    Procedure:  KNEE ARTHROSCOPY (Left Knee) Diagnosis:       Acute pain of left knee      (Acu

## 2019-05-15 NOTE — OPERATIVE REPORT
[facility]    PATIENT'S NAME: Caffie Red   ATTENDING PHYSICIAN: Olivia Byrd MD   OPERATING PHYSICIAN: Olivia Byrd MD   PATIENT ACCOUNT#:   [id number]    LOCATION:  [facility] PACU [address] [address]  MEDICAL RECORD #:   [id number]       CATALINA Howell standard surgical fashion. Perioperative antibiotics were infused. Confirmation of identity and laterality took place. Time-out was performed. An anteromedial and lateral portal was made onto the anterior aspect of the left knee.   Diagnostic arthrosc

## 2019-05-15 NOTE — BRIEF OP NOTE
Pre-Operative Diagnosis: Acute pain of left knee [M25.562]     Post-Operative Diagnosis: Acute pain of left knee [M25.562], medial meniscus tear      Procedure Performed:   Procedure(s):  LEFT KNEE ARTHROSCOPY, MEDIAL MENISCUS REPAIR    Surgeon(s) and Role

## 2019-05-15 NOTE — ANESTHESIA PROCEDURE NOTES
Airway  Urgency: elective    Airway not difficult    General Information and Staff    Patient location during procedure: OR  Anesthesiologist: Angel Oglesby MD  Resident/CRNA: Jose Alfredo Le CRNA  Performed: anesthesiologist and CRNA     Sonja

## 2019-05-15 NOTE — INTERVAL H&P NOTE
Pre-op Diagnosis: Acute pain of left knee [M25.562]    The above referenced H&P was reviewed by Aly Bartlett MD on 5/15/2019, the patient was examined and no significant changes have occurred in the patient's condition since the H&P was performed.

## 2019-05-17 ENCOUNTER — OFFICE VISIT (OUTPATIENT)
Dept: ORTHOPEDICS CLINIC | Facility: CLINIC | Age: 39
End: 2019-05-17
Payer: COMMERCIAL

## 2019-05-17 DIAGNOSIS — S83.242A ACUTE MEDIAL MENISCUS TEAR, LEFT, INITIAL ENCOUNTER: Primary | ICD-10-CM

## 2019-05-17 PROCEDURE — 99212 OFFICE O/P EST SF 10 MIN: CPT | Performed by: ORTHOPAEDIC SURGERY

## 2019-05-17 PROCEDURE — 99024 POSTOP FOLLOW-UP VISIT: CPT | Performed by: ORTHOPAEDIC SURGERY

## 2019-05-17 NOTE — PROGRESS NOTES
This is a pleasant 41-year-old female that is 2 days status post left knee arthroscopy and medial meniscus repair. She has had no chest pain or shortness of breath. She comes in today for her first postoperative evaluation.   The patient has been taking a

## 2019-05-22 ENCOUNTER — OFFICE VISIT (OUTPATIENT)
Dept: PHYSICAL THERAPY | Facility: HOSPITAL | Age: 39
End: 2019-05-22
Attending: ORTHOPAEDIC SURGERY
Payer: COMMERCIAL

## 2019-05-22 DIAGNOSIS — S83.242A ACUTE MEDIAL MENISCUS TEAR, LEFT, INITIAL ENCOUNTER: ICD-10-CM

## 2019-05-22 PROCEDURE — 97116 GAIT TRAINING THERAPY: CPT

## 2019-05-22 PROCEDURE — 97110 THERAPEUTIC EXERCISES: CPT

## 2019-05-22 PROCEDURE — 97161 PT EVAL LOW COMPLEX 20 MIN: CPT

## 2019-05-22 NOTE — PROGRESS NOTES
POST-OP KNEE EVALUATION:   Referring Physician: Dr. Elvin Lui medial meniscus tear, left, initial encounter (I18.444X)  S/p meniscal repair 5/15/19  Precautions: follow wt bearing and Protocol precautions for wt bearing and ROM progression Pt goals include : for the swelling and pain to go down and away and the knee to bend more. Has elevator to reach apartment. Past medical history was reviewed with Xin Roberson. Significant findings include: migraines, low back pain, HTN, Esophageal reflux. Austin Allison would benefit from skilled Physical Therapy to address the above impairments to attempt goal achievement as followed and described below      In agreement with FOTO score and clinical rationale, this evaluation involved Low Complexity decision making Charges: PT Eval, low complexity Total Timed Treatment: 8 mins, 14  Min gait training Total Treatment Time:55  min         PLAN OF CARE:    Goals:  Achieved by 5 wks   · Pt will improve knee extension ROM to 0 deg to allow proper heel strike during gait an Thank you for your referral. Please co-sign or sign and return this letter via fax as soon as possible to [phone].  If you have any questions, please contact me at Dept: [phone]    Sincerely,  Electronically signed by therapist: Camilo Mullen PT

## 2019-05-31 ENCOUNTER — OFFICE VISIT (OUTPATIENT)
Dept: ORTHOPEDICS CLINIC | Facility: CLINIC | Age: 39
End: 2019-05-31
Payer: COMMERCIAL

## 2019-05-31 DIAGNOSIS — S83.242A ACUTE MEDIAL MENISCUS TEAR, LEFT, INITIAL ENCOUNTER: Primary | ICD-10-CM

## 2019-05-31 PROCEDURE — 99212 OFFICE O/P EST SF 10 MIN: CPT | Performed by: ORTHOPAEDIC SURGERY

## 2019-05-31 PROCEDURE — 99024 POSTOP FOLLOW-UP VISIT: CPT | Performed by: ORTHOPAEDIC SURGERY

## 2019-05-31 RX ORDER — ACETAMINOPHEN AND CODEINE PHOSPHATE 300; 30 MG/1; MG/1
1 TABLET ORAL EVERY 6 HOURS PRN
Qty: 40 TABLET | Refills: 0 | Status: SHIPPED | OUTPATIENT
Start: 2019-05-31 | End: 2019-06-05 | Stop reason: ALTCHOICE

## 2019-05-31 NOTE — PROGRESS NOTES
This is a pleasant 54-year-old female that is 2 weeks status post left knee arthroscopy and medial meniscus repair. She has performed one visit of physical therapy. Patient reports that she has had increased pain about the left knee.   She is currently am

## 2019-06-05 ENCOUNTER — OFFICE VISIT (OUTPATIENT)
Dept: INTERNAL MEDICINE CLINIC | Facility: CLINIC | Age: 39
End: 2019-06-05
Payer: COMMERCIAL

## 2019-06-05 VITALS
BODY MASS INDEX: 32.57 KG/M2 | HEART RATE: 104 BPM | SYSTOLIC BLOOD PRESSURE: 104 MMHG | OXYGEN SATURATION: 100 % | WEIGHT: 177 LBS | RESPIRATION RATE: 19 BRPM | DIASTOLIC BLOOD PRESSURE: 70 MMHG | TEMPERATURE: 98 F | HEIGHT: 62 IN

## 2019-06-05 DIAGNOSIS — Z87.898 HISTORY OF PALPITATIONS: ICD-10-CM

## 2019-06-05 DIAGNOSIS — R00.0 TACHYCARDIA: ICD-10-CM

## 2019-06-05 DIAGNOSIS — E04.1 LEFT THYROID NODULE: ICD-10-CM

## 2019-06-05 DIAGNOSIS — M22.42 CHONDROMALACIA OF LEFT PATELLOFEMORAL JOINT: ICD-10-CM

## 2019-06-05 DIAGNOSIS — M25.562 KNEE PAIN, LEFT ANTERIOR: Primary | ICD-10-CM

## 2019-06-05 DIAGNOSIS — S83.411D TEAR OF MEDIAL COLLATERAL LIGAMENT OF RIGHT KNEE, SUBSEQUENT ENCOUNTER: ICD-10-CM

## 2019-06-05 DIAGNOSIS — R59.0 ADENOPATHY, CERVICAL: ICD-10-CM

## 2019-06-05 PROCEDURE — 84439 ASSAY OF FREE THYROXINE: CPT | Performed by: INTERNAL MEDICINE

## 2019-06-05 PROCEDURE — 99214 OFFICE O/P EST MOD 30 MIN: CPT | Performed by: INTERNAL MEDICINE

## 2019-06-05 PROCEDURE — 36415 COLL VENOUS BLD VENIPUNCTURE: CPT | Performed by: INTERNAL MEDICINE

## 2019-06-05 PROCEDURE — 84443 ASSAY THYROID STIM HORMONE: CPT | Performed by: INTERNAL MEDICINE

## 2019-06-05 PROCEDURE — 80048 BASIC METABOLIC PNL TOTAL CA: CPT | Performed by: INTERNAL MEDICINE

## 2019-06-05 PROCEDURE — 83735 ASSAY OF MAGNESIUM: CPT | Performed by: INTERNAL MEDICINE

## 2019-06-05 NOTE — PROGRESS NOTES
Marilee Stewart is a 35year old female. Patient presents with:  Referral: pt in for referral for pain managent       HPI:         Pain top left kneecap, whenever has any movement knee. To try to wear knee brace. Not hot or red. No fever.   No pain at Smoker      Smokeless tobacco: Never Used    Alcohol use: No      Alcohol/week: 0.0 oz      Comment: rare    Drug use: No         Review of System:  CONSTITUTION: denies fevers,  chills, or sweats  HEENT: denies sore throat,  change in vision or hearing  C .    Assessment/Plan:  (T22.887) Knee pain, left anterior  (primary encounter diagnosis)  Status post left knee arthroscopy, medial meniscus repair May 15, 2019.   Plan: PHYSIATRY - INTERNAL  Patient states she is not tolerating Norco.  She wishes p

## 2019-06-05 NOTE — PROGRESS NOTES
Pt presented to clinic today for blood draw. Per physician able to draw orders. Orders  documented within chart. Pt tolerated lab draw well.  verified.   Orders drawn include: bmp, mag, tsh+ t4  Site of draw: left arm   Mau Johnson CMA

## 2019-06-07 ENCOUNTER — TELEPHONE (OUTPATIENT)
Dept: PHYSICAL THERAPY | Facility: HOSPITAL | Age: 39
End: 2019-06-07

## 2019-06-07 ENCOUNTER — APPOINTMENT (OUTPATIENT)
Dept: PHYSICAL THERAPY | Facility: HOSPITAL | Age: 39
End: 2019-06-07
Attending: ORTHOPAEDIC SURGERY
Payer: COMMERCIAL

## 2019-06-08 PROBLEM — E04.1 LEFT THYROID NODULE: Status: ACTIVE | Noted: 2018-08-19

## 2019-06-08 PROBLEM — R00.0 TACHYCARDIA: Status: ACTIVE | Noted: 2019-06-08

## 2019-06-11 ENCOUNTER — OFFICE VISIT (OUTPATIENT)
Dept: PHYSICAL THERAPY | Facility: HOSPITAL | Age: 39
End: 2019-06-11
Attending: ORTHOPAEDIC SURGERY
Payer: COMMERCIAL

## 2019-06-11 DIAGNOSIS — S83.242A ACUTE MEDIAL MENISCUS TEAR, LEFT, INITIAL ENCOUNTER: ICD-10-CM

## 2019-06-11 PROCEDURE — 97110 THERAPEUTIC EXERCISES: CPT

## 2019-06-11 PROCEDURE — 97140 MANUAL THERAPY 1/> REGIONS: CPT

## 2019-06-11 NOTE — PROGRESS NOTES
Dx: Acute medial meniscus tear, left, initial encounter (T75.659X)  S/p meniscal repair 5/15/19         Insurance (Authorized # of Visits):  8 per POC (PP0)           Authorizing Physician: Dr. Jovanny Calderon  Next MD visit: 6/17/19  Fall Risk: standard         P and shoes on left LE  · Pt will improve quad strength to 5/5 to ascend 1 flight of stairs reciprocally without UE assist   · Pt will increase hip and knee strength to grossly 4+/5 to be able to get up and down from the floor safely  · Pt will demonstrate i

## 2019-06-12 ENCOUNTER — OFFICE VISIT (OUTPATIENT)
Dept: NEUROLOGY | Facility: CLINIC | Age: 39
End: 2019-06-12
Payer: COMMERCIAL

## 2019-06-12 ENCOUNTER — HOSPITAL ENCOUNTER (OUTPATIENT)
Dept: CV DIAGNOSTICS | Facility: HOSPITAL | Age: 39
Discharge: HOME | End: 2019-06-12
Attending: INTERNAL MEDICINE
Payer: COMMERCIAL

## 2019-06-12 VITALS
HEART RATE: 104 BPM | WEIGHT: 184 LBS | DIASTOLIC BLOOD PRESSURE: 80 MMHG | BODY MASS INDEX: 34.74 KG/M2 | HEIGHT: 61 IN | SYSTOLIC BLOOD PRESSURE: 120 MMHG | RESPIRATION RATE: 18 BRPM

## 2019-06-12 DIAGNOSIS — M22.2X2 PATELLOFEMORAL PAIN SYNDROME OF LEFT KNEE: ICD-10-CM

## 2019-06-12 DIAGNOSIS — G89.29 CHRONIC PAIN OF LEFT KNEE: ICD-10-CM

## 2019-06-12 DIAGNOSIS — M25.562 TENDERNESS OF LEFT PATELLA: Primary | ICD-10-CM

## 2019-06-12 DIAGNOSIS — R00.0 TACHYCARDIA: ICD-10-CM

## 2019-06-12 DIAGNOSIS — Z87.898 HISTORY OF PALPITATIONS: ICD-10-CM

## 2019-06-12 DIAGNOSIS — M76.52 PATELLAR TENDINITIS OF LEFT KNEE: ICD-10-CM

## 2019-06-12 DIAGNOSIS — M25.562 CHRONIC PAIN OF LEFT KNEE: ICD-10-CM

## 2019-06-12 PROCEDURE — 93227 XTRNL ECG REC<48 HR R&I: CPT | Performed by: INTERNAL MEDICINE

## 2019-06-12 PROCEDURE — 93225 XTRNL ECG REC<48 HRS REC: CPT | Performed by: INTERNAL MEDICINE

## 2019-06-12 PROCEDURE — 99244 OFF/OP CNSLTJ NEW/EST MOD 40: CPT | Performed by: PHYSICAL MEDICINE & REHABILITATION

## 2019-06-12 NOTE — H&P
[address] H&P    Requesting Physician: Miguelina Kumari MD    Chief Complaint (Reason for Visit):  Patient presents with:  Leg Pain: Pt is present with a torn MCL, Pt had surgery in May, and ha Problem Relation Age of Onset   • Diabetes Mother    • Other (hepatitis) Mother         hepatitis C   • Diabetes Maternal Grandmother    • Heart Disorder Maternal Grandmother 40        atrial fibrillation, heart problems in cousins, some who smoke   • Ot distress  Head: Normocephalic/ Atraumatic  Eyes: Extra-occular movements intact. Ears: No auricular hematoma or deformities  Mouth: No lesions or ulcerations  Heart: peripheral pulses intact. Normal capillary refill.    Lungs: Non-labored respirations  Ab Final   • Anion Gap 06/05/2019 7  0 - 18 mmol/L Final   • BUN 06/05/2019 6* 7 - 18 mg/dL Final   • Creatinine 06/05/2019 0.80  0.55 - 1.02 mg/dL Final   • BUN/CREA Ratio 06/05/2019 7.5* 10.0 - 20.0 Final   • Calcium, Total 06/05/2019 9.4  8.5 - 10.1 mg/dL Maddy Jimenez MD on 12/17/2018 at 8:09       Approved by (CST): Kori Mcconnell MD on 12/17/2018 at [address]:  The patient is a 39-year-old female who is coming in complaining of left anterior knee pain at the insertion of the patellar t

## 2019-06-12 NOTE — PATIENT INSTRUCTIONS
1) Use the diclofenac gel 4 times per day over the affected area  2) Continue with physical therapy and home exercise program,  3) Try friction massage or ice cup massage (wax paper cup you put in freezer)  4) Think about prolotherapy injections to the pat the individual and processed on the SAME DAY. Platelets are  out and then concentrated many fold.   The PRP made from the patient is placed into the damaged area that had been identified by use of image guidance (musculoskeletal ultrasound  it is [place] with one of our sports medicine physicians who focuse their practice on this area. You can reach him at the following:    [facility]  [address]  [facility] [place], [place],  Melly Zaldivar

## 2019-06-13 ENCOUNTER — TELEPHONE (OUTPATIENT)
Dept: INTERNAL MEDICINE CLINIC | Facility: CLINIC | Age: 39
End: 2019-06-13

## 2019-06-13 ENCOUNTER — OFFICE VISIT (OUTPATIENT)
Dept: PHYSICAL THERAPY | Facility: HOSPITAL | Age: 39
End: 2019-06-13
Attending: ORTHOPAEDIC SURGERY
Payer: COMMERCIAL

## 2019-06-13 DIAGNOSIS — S83.242A ACUTE MEDIAL MENISCUS TEAR, LEFT, INITIAL ENCOUNTER: ICD-10-CM

## 2019-06-13 PROCEDURE — 97110 THERAPEUTIC EXERCISES: CPT

## 2019-06-13 PROCEDURE — 97140 MANUAL THERAPY 1/> REGIONS: CPT

## 2019-06-13 NOTE — PROGRESS NOTES
Dx: Acute medial meniscus tear, left, initial encounter (M79.725R)  S/p meniscal repair 5/15/19         Insurance (Authorized # of Visits):  8 per POC (PP0)           Authorizing Physician: Dr. Jovanny Calderon  Next MD visit: 6/17/19  Fall Risk: standard         P demonstrate increased hip ER/ABD strength to 5-/5 to perform stepping and squatting activities without excessive femoral IR/ADD  · Pt will improve SLS to > 30+s bilateral to improve safety and independence with gait on uneven surfaces such as grass   · Pt

## 2019-06-18 ENCOUNTER — OFFICE VISIT (OUTPATIENT)
Dept: ORTHOPEDICS CLINIC | Facility: CLINIC | Age: 39
End: 2019-06-18
Payer: COMMERCIAL

## 2019-06-18 ENCOUNTER — TELEPHONE (OUTPATIENT)
Dept: PHYSICAL THERAPY | Facility: HOSPITAL | Age: 39
End: 2019-06-18

## 2019-06-18 ENCOUNTER — TELEPHONE (OUTPATIENT)
Dept: ORTHOPEDICS CLINIC | Facility: CLINIC | Age: 39
End: 2019-06-18

## 2019-06-18 DIAGNOSIS — S83.242A ACUTE MEDIAL MENISCUS TEAR, LEFT, INITIAL ENCOUNTER: Primary | ICD-10-CM

## 2019-06-18 PROCEDURE — 99024 POSTOP FOLLOW-UP VISIT: CPT | Performed by: ORTHOPAEDIC SURGERY

## 2019-06-18 PROCEDURE — 99212 OFFICE O/P EST SF 10 MIN: CPT | Performed by: ORTHOPAEDIC SURGERY

## 2019-06-18 NOTE — PROGRESS NOTES
This is a pleasant 65-year-old female that is 1 month status post left knee arthroscopy medial meniscus repair. The patient has been using her crutches for the last 2 weeks and report her pain is improved.   She is also seen when the pain management physic

## 2019-06-18 NOTE — TELEPHONE ENCOUNTER
Pt dropped off forms to return back to work for completion. Pt recently signed [employer] for her [facility] paperwork. Forwarded forms to [facility].

## 2019-06-19 ENCOUNTER — APPOINTMENT (OUTPATIENT)
Dept: PHYSICAL THERAPY | Facility: HOSPITAL | Age: 39
End: 2019-06-19
Attending: ORTHOPAEDIC SURGERY
Payer: COMMERCIAL

## 2019-06-21 ENCOUNTER — APPOINTMENT (OUTPATIENT)
Dept: PHYSICAL THERAPY | Facility: HOSPITAL | Age: 39
End: 2019-06-21
Attending: ORTHOPAEDIC SURGERY
Payer: COMMERCIAL

## 2019-06-25 ENCOUNTER — APPOINTMENT (OUTPATIENT)
Dept: PHYSICAL THERAPY | Facility: HOSPITAL | Age: 39
End: 2019-06-25
Attending: ORTHOPAEDIC SURGERY
Payer: COMMERCIAL

## 2019-06-26 ENCOUNTER — TELEPHONE (OUTPATIENT)
Dept: ORTHOPEDICS CLINIC | Facility: CLINIC | Age: 39
End: 2019-06-26

## 2019-06-26 NOTE — TELEPHONE ENCOUNTER
Dr. Mohinder Denson,    Pt is requesting a Return to work letter for 7/8/19 instead of 7/2/19 with sedentary position  - no lifting for 6 weeks. Please advise. If approved, please send letter to pt's Mychart.     Thank you,  [facility]

## 2019-06-27 ENCOUNTER — APPOINTMENT (OUTPATIENT)
Dept: PHYSICAL THERAPY | Facility: HOSPITAL | Age: 39
End: 2019-06-27
Attending: ORTHOPAEDIC SURGERY
Payer: COMMERCIAL

## 2019-06-27 NOTE — TELEPHONE ENCOUNTER
Dr. Nolan Hubbard form and RTW form    Please sign off on form:  -Highlight the patient and hit \"Chart\" button. -In Chart Review, w/in the Encounter tab - click 1 time on the Telephone call encounter for 6/18/19.  Scroll down the telephone encounter

## 2019-06-28 NOTE — TELEPHONE ENCOUNTER
Faxed [employer] form and RTW form to [employer] - [phone]. Mailed copies to pt.  Notified pt via [address] [place] [address] [address]

## 2019-07-02 ENCOUNTER — APPOINTMENT (OUTPATIENT)
Dept: PHYSICAL THERAPY | Facility: HOSPITAL | Age: 39
End: 2019-07-02
Attending: ORTHOPAEDIC SURGERY
Payer: COMMERCIAL

## 2019-07-11 ENCOUNTER — TELEPHONE (OUTPATIENT)
Dept: NEUROLOGY | Facility: CLINIC | Age: 39
End: 2019-07-11

## 2019-07-12 ENCOUNTER — TELEPHONE (OUTPATIENT)
Dept: NEUROLOGY | Facility: CLINIC | Age: 39
End: 2019-07-12

## 2019-07-12 ENCOUNTER — APPOINTMENT (OUTPATIENT)
Dept: PHYSICAL THERAPY | Facility: HOSPITAL | Age: 39
End: 2019-07-12
Attending: ORTHOPAEDIC SURGERY
Payer: COMMERCIAL

## 2019-07-12 NOTE — TELEPHONE ENCOUNTER
Left a detailed message on the Pt's voice mail of the Doctors recommendations     Sent message to Dr Paz Chance to inform him if he has an alternative procedure

## 2019-07-12 NOTE — TELEPHONE ENCOUNTER
[employer] for authorization of approval for LEFT patella tendon insertion prolotherapy under ultrasound guidance cpt code . It is NOT a covered benefit. Will inform Nursing.

## 2019-07-15 NOTE — TELEPHONE ENCOUNTER
Left message on patient voice mail if using diclofenac gel and if offering pain relief. Automatic renewal request from pharmacy received on 7/11/19.

## 2019-07-16 ENCOUNTER — TELEPHONE (OUTPATIENT)
Dept: NEUROLOGY | Facility: CLINIC | Age: 39
End: 2019-07-16

## 2019-07-16 NOTE — TELEPHONE ENCOUNTER
Left a detailed message on the Pt's voice mail advising her not to take any anti inflammatory medication for 24 hours

## 2019-07-17 ENCOUNTER — OFFICE VISIT (OUTPATIENT)
Dept: NEUROLOGY | Facility: CLINIC | Age: 39
End: 2019-07-17

## 2019-07-17 VITALS — HEART RATE: 118 BPM | DIASTOLIC BLOOD PRESSURE: 80 MMHG | SYSTOLIC BLOOD PRESSURE: 122 MMHG

## 2019-07-17 DIAGNOSIS — M76.52 PATELLAR TENDINITIS OF LEFT KNEE: ICD-10-CM

## 2019-07-17 DIAGNOSIS — M22.2X2 PATELLOFEMORAL PAIN SYNDROME OF LEFT KNEE: ICD-10-CM

## 2019-07-17 DIAGNOSIS — M25.562 TENDERNESS OF LEFT PATELLA: Primary | ICD-10-CM

## 2019-07-17 PROCEDURE — M0076 PROLOTHERAPY: HCPCS | Performed by: PHYSICAL MEDICINE & REHABILITATION

## 2019-07-17 PROCEDURE — 76942 ECHO GUIDE FOR BIOPSY: CPT | Performed by: PHYSICAL MEDICINE & REHABILITATION

## 2019-07-17 RX ORDER — DEXTROSE MONOHYDRATE 25 G/50ML
25 INJECTION, SOLUTION INTRAVENOUS ONCE
Status: COMPLETED | OUTPATIENT
Start: 2019-07-17 | End: 2019-07-17

## 2019-07-17 RX ORDER — LIDOCAINE HYDROCHLORIDE 10 MG/ML
3 INJECTION, SOLUTION INFILTRATION; PERINEURAL ONCE
Status: COMPLETED | OUTPATIENT
Start: 2019-07-17 | End: 2019-07-17

## 2019-07-17 RX ADMIN — LIDOCAINE HYDROCHLORIDE 3 ML: 10 INJECTION, SOLUTION INFILTRATION; PERINEURAL at 17:16:00

## 2019-07-17 RX ADMIN — DEXTROSE MONOHYDRATE 25 ML: 25 INJECTION, SOLUTION INTRAVENOUS at 17:16:00

## 2019-07-17 NOTE — TELEPHONE ENCOUNTER
See phone encounter 7/12/19 for further information. Patient states diclofenac gel did not help and is not using. Requests no refill.

## 2019-07-17 NOTE — TELEPHONE ENCOUNTER
See phone encounter 7/16/19. Voice mail message left for [facility] pharmacy denying refill request.   This encounter closed.

## 2019-07-17 NOTE — PROCEDURES
[address]   LEFT Patella Tendon Prolotherapy Injection Procedure Note    CHIEF COMPLAINT:  Patient presents with:   Injection      PROCEDURE PERFORMED:   LEFT Patella tendon prolotherapy  5 cc of 25% dextros probe, the left patella tendon was identified on ultrasound. Next, using a 25-gauge 1-1/2 inch needle, the skin was anesthetized, and a wheel was created. Then is was introduced and advanced until seen accessing the LEFT patella Tendon insertion.   A tota

## 2019-07-18 ENCOUNTER — MED REC SCAN ONLY (OUTPATIENT)
Dept: NEUROLOGY | Facility: CLINIC | Age: 39
End: 2019-07-18

## 2019-08-01 ENCOUNTER — TELEPHONE (OUTPATIENT)
Dept: NEUROLOGY | Facility: CLINIC | Age: 39
End: 2019-08-01

## 2019-08-01 RX ORDER — AMITRIPTYLINE HYDROCHLORIDE 25 MG/1
TABLET, FILM COATED ORAL
Qty: 90 TABLET | Refills: 1 | Status: SHIPPED | OUTPATIENT
Start: 2019-08-01 | End: 2019-08-26

## 2019-08-01 NOTE — TELEPHONE ENCOUNTER
Amitriptyline 25mg. Take 3 tablets at HS.  #90. 1 refill    Last filled-2/12/2019    LOV-12/4/2018  NOV-10/16/2019

## 2019-08-02 NOTE — TELEPHONE ENCOUNTER
Returned patient's call.      Informed patient that her Amitriptyline was filled last night and is available at [facility]

## 2019-08-24 ENCOUNTER — OFFICE VISIT (OUTPATIENT)
Dept: NEUROLOGY | Facility: CLINIC | Age: 39
End: 2019-08-24
Payer: COMMERCIAL

## 2019-08-24 VITALS
DIASTOLIC BLOOD PRESSURE: 76 MMHG | BODY MASS INDEX: 30.96 KG/M2 | RESPIRATION RATE: 18 BRPM | WEIGHT: 164 LBS | SYSTOLIC BLOOD PRESSURE: 118 MMHG | HEART RATE: 120 BPM | HEIGHT: 61 IN

## 2019-08-24 DIAGNOSIS — M22.2X2 PATELLOFEMORAL PAIN SYNDROME OF LEFT KNEE: ICD-10-CM

## 2019-08-24 DIAGNOSIS — M25.562 TENDERNESS OF LEFT PATELLA: Primary | ICD-10-CM

## 2019-08-24 DIAGNOSIS — G89.29 CHRONIC PAIN OF LEFT KNEE: ICD-10-CM

## 2019-08-24 DIAGNOSIS — M48.062 SPINAL STENOSIS OF LUMBAR REGION WITH NEUROGENIC CLAUDICATION: ICD-10-CM

## 2019-08-24 DIAGNOSIS — M25.562 CHRONIC PAIN OF LEFT KNEE: ICD-10-CM

## 2019-08-24 DIAGNOSIS — M76.52 PATELLAR TENDINITIS OF LEFT KNEE: ICD-10-CM

## 2019-08-24 DIAGNOSIS — M54.16 LUMBAR RADICULOPATHY: ICD-10-CM

## 2019-08-24 PROCEDURE — 99213 OFFICE O/P EST LOW 20 MIN: CPT | Performed by: PHYSICAL MEDICINE & REHABILITATION

## 2019-08-24 NOTE — PROGRESS NOTES
[address]  Progress Note    CHIEF COMPLAINT:  Patient presents with:  Knee Pain: F/u for LEFT Patella tendon prolotherapy on  07/17/19, patient states 0% relief from injection.        History of Present Illn (hepatitis) Mother         hepatitis C   • Diabetes Maternal Grandmother    • Heart Disorder Maternal Grandmother 40        atrial fibrillation, heart problems in cousins, some who smoke   • Other (hepatitis C) Maternal Grandmother    • Cancer Other 36 oriented x 3, attentive, able to follow 2 step commands, comprehention intact, spontaneous speech intact      Motor:    Musculoskeletal:    KNEE:  Inspection: no erythema, swelling, or obvious deformity.  Well healed incisions at the medial and lateral infe -American 06/05/2019 107  >=60 Final   • FASTING 06/05/2019 No   Final   Admission on 05/15/2019, Discharged on 05/15/2019   Component Date Value Ref Range Status   • POCT Urine Pregnancy 05/15/2019 Negative  Negative Final   ]      Radiology Imagin other potential treatment options which may include PRP versus a second prolotherapy injection. She is also considering potential surgical debridement again.   She will follow-up with me in 1 month and at that point we can decide if she would like to have

## 2019-08-26 ENCOUNTER — TELEPHONE (OUTPATIENT)
Dept: NEUROLOGY | Facility: CLINIC | Age: 39
End: 2019-08-26

## 2019-08-26 ENCOUNTER — DOCUMENTATION ONLY (OUTPATIENT)
Dept: NEUROLOGY | Facility: CLINIC | Age: 39
End: 2019-08-26

## 2019-08-26 NOTE — TELEPHONE ENCOUNTER
[employer] online for insurance coverage of PRP injection under ultrasound guidance to the inferior pole of the LEFT patella and LEFT patella tendon cpt codes 0232T. Insurance was verified and procedure is NOT a cover benefit.  Will inform Nursing

## 2019-08-26 NOTE — PROGRESS NOTES
Ms. Sergo Russell dropped off a note today stating the following:  \"Per conversation on Saturday, I am following up with you per my visit with orthopedics. \"  This is the plan: Repeat MRI of the left knee and follow-up with orthopedics.   At that point they will

## 2019-08-27 RX ORDER — AMITRIPTYLINE HYDROCHLORIDE 25 MG/1
TABLET, FILM COATED ORAL
Qty: 90 TABLET | Refills: 1 | Status: SHIPPED | OUTPATIENT
Start: 2019-08-27 | End: 2019-09-23

## 2019-09-09 ENCOUNTER — OFFICE VISIT (OUTPATIENT)
Dept: INTERNAL MEDICINE CLINIC | Facility: CLINIC | Age: 39
End: 2019-09-09
Payer: COMMERCIAL

## 2019-09-09 VITALS
DIASTOLIC BLOOD PRESSURE: 80 MMHG | OXYGEN SATURATION: 99 % | HEIGHT: 61 IN | HEART RATE: 112 BPM | WEIGHT: 180 LBS | SYSTOLIC BLOOD PRESSURE: 128 MMHG | BODY MASS INDEX: 33.99 KG/M2

## 2019-09-09 DIAGNOSIS — M22.42 CHONDROMALACIA OF LEFT PATELLOFEMORAL JOINT: ICD-10-CM

## 2019-09-09 DIAGNOSIS — Z87.898 HISTORY OF PALPITATIONS: ICD-10-CM

## 2019-09-09 DIAGNOSIS — M76.52 PATELLAR TENDINITIS OF LEFT KNEE: Primary | ICD-10-CM

## 2019-09-09 DIAGNOSIS — Z87.09 HISTORY OF ASTHMA: ICD-10-CM

## 2019-09-09 DIAGNOSIS — E04.1 LEFT THYROID NODULE: ICD-10-CM

## 2019-09-09 DIAGNOSIS — R00.0 TACHYCARDIA: ICD-10-CM

## 2019-09-09 LAB
ANION GAP SERPL CALC-SCNC: 8 MMOL/L (ref 0–18)
BASOPHILS # BLD AUTO: 0.06 X10(3) UL (ref 0–0.2)
BASOPHILS NFR BLD AUTO: 0.8 %
BUN BLD-MCNC: 5 MG/DL (ref 7–18)
BUN/CREAT SERPL: 6.5 (ref 10–20)
CALCIUM BLD-MCNC: 8.9 MG/DL (ref 8.5–10.1)
CHLORIDE SERPL-SCNC: 108 MMOL/L (ref 98–112)
CO2 SERPL-SCNC: 24 MMOL/L (ref 21–32)
CREAT BLD-MCNC: 0.77 MG/DL (ref 0.55–1.02)
DEPRECATED RDW RBC AUTO: 41.4 FL (ref 35.1–46.3)
EOSINOPHIL # BLD AUTO: 0.11 X10(3) UL (ref 0–0.7)
EOSINOPHIL NFR BLD AUTO: 1.4 %
ERYTHROCYTE [DISTWIDTH] IN BLOOD BY AUTOMATED COUNT: 14.2 % (ref 11–15)
GLUCOSE BLD-MCNC: 63 MG/DL (ref 70–99)
HAV IGM SER QL: 2.3 MG/DL (ref 1.6–2.6)
HCT VFR BLD AUTO: 43.3 % (ref 35–48)
HGB BLD-MCNC: 13.1 G/DL (ref 12–16)
IMM GRANULOCYTES # BLD AUTO: 0.02 X10(3) UL (ref 0–1)
IMM GRANULOCYTES NFR BLD: 0.3 %
LYMPHOCYTES # BLD AUTO: 1.97 X10(3) UL (ref 1–4)
LYMPHOCYTES NFR BLD AUTO: 25.5 %
MCH RBC QN AUTO: 24.6 PG (ref 26–34)
MCHC RBC AUTO-ENTMCNC: 30.3 G/DL (ref 31–37)
MCV RBC AUTO: 81.2 FL (ref 80–100)
MONOCYTES # BLD AUTO: 0.7 X10(3) UL (ref 0.1–1)
MONOCYTES NFR BLD AUTO: 9.1 %
NEUTROPHILS # BLD AUTO: 4.86 X10 (3) UL (ref 1.5–7.7)
NEUTROPHILS # BLD AUTO: 4.86 X10(3) UL (ref 1.5–7.7)
NEUTROPHILS NFR BLD AUTO: 62.9 %
OSMOLALITY SERPL CALC.SUM OF ELEC: 285 MOSM/KG (ref 275–295)
PATIENT FASTING: NO
PLATELET # BLD AUTO: 460 10(3)UL (ref 150–450)
POTASSIUM SERPL-SCNC: 3.6 MMOL/L (ref 3.5–5.1)
RBC # BLD AUTO: 5.33 X10(6)UL (ref 3.8–5.3)
SODIUM SERPL-SCNC: 140 MMOL/L (ref 136–145)
T4 FREE SERPL-MCNC: 1 NG/DL (ref 0.8–1.7)
TSI SER-ACNC: 0.64 MIU/ML (ref 0.36–3.74)
WBC # BLD AUTO: 7.7 X10(3) UL (ref 4–11)

## 2019-09-09 PROCEDURE — 84439 ASSAY OF FREE THYROXINE: CPT | Performed by: INTERNAL MEDICINE

## 2019-09-09 PROCEDURE — 85025 COMPLETE CBC W/AUTO DIFF WBC: CPT | Performed by: INTERNAL MEDICINE

## 2019-09-09 PROCEDURE — 83735 ASSAY OF MAGNESIUM: CPT | Performed by: INTERNAL MEDICINE

## 2019-09-09 PROCEDURE — 80048 BASIC METABOLIC PNL TOTAL CA: CPT | Performed by: INTERNAL MEDICINE

## 2019-09-09 PROCEDURE — 99214 OFFICE O/P EST MOD 30 MIN: CPT | Performed by: INTERNAL MEDICINE

## 2019-09-09 PROCEDURE — 84443 ASSAY THYROID STIM HORMONE: CPT | Performed by: INTERNAL MEDICINE

## 2019-09-09 NOTE — PROGRESS NOTES
Sandie Taylor is a 35year old female. Patient presents with:  Pre-Op Exam      HPI:         To have left knee arthroscopy and patellar tendon debridement in 1 week due to chronic pain left knee. Second MRI showed inflammation of tendon left knee.   Sa (hepatitis) Mother         hepatitis C   • Diabetes Maternal Grandmother    • Heart Disorder Maternal Grandmother 40        atrial fibrillation, heart problems in cousins, some who smoke   • Other (hepatitis C) Maternal Grandmother    • Cancer Other 35 warmth knees. Objectives:  Results for orders placed or performed in visit on 06/05/19   TSH+FREE T4   Result Value Ref Range    Free T4 1.0 0.8 - 1.7 ng/dL    TSH 0.481 0.358 - 3.740 mIU/mL   MAGNESIUM   Result Value Ref Range    Magnesium 2.5 1.6 - 2. There is no evidence of tear. ARTICULAR CARTILAGE: The articular cartilage is well maintained.   SOFT TISSUES: The proton-density images demonstrate a prominent area of heterogeneous hyperintensity involving the superolateral corner of the infrapatellar fa Holter noted, done on amitriptyline    (Z87.09) History of asthma  Plan: Recently only exacerbated by upper respiratory infection.   Patient feels well now    (E04.1) Left thyroid nodule  Plan: Follow-up US ordered            Imaging & Consults:  EKG 12-CARLINE

## 2019-09-12 ENCOUNTER — OFFICE VISIT (OUTPATIENT)
Dept: INTERNAL MEDICINE CLINIC | Facility: CLINIC | Age: 39
End: 2019-09-12
Payer: COMMERCIAL

## 2019-09-12 ENCOUNTER — TELEPHONE (OUTPATIENT)
Dept: INTERNAL MEDICINE CLINIC | Facility: CLINIC | Age: 39
End: 2019-09-12

## 2019-09-12 VITALS
HEART RATE: 100 BPM | WEIGHT: 180 LBS | HEIGHT: 61 IN | BODY MASS INDEX: 33.99 KG/M2 | DIASTOLIC BLOOD PRESSURE: 78 MMHG | OXYGEN SATURATION: 97 % | RESPIRATION RATE: 22 BRPM | SYSTOLIC BLOOD PRESSURE: 126 MMHG

## 2019-09-12 DIAGNOSIS — E16.2 HYPOGLYCEMIA: ICD-10-CM

## 2019-09-12 DIAGNOSIS — Z87.09 HISTORY OF ASTHMA: ICD-10-CM

## 2019-09-12 DIAGNOSIS — M76.52 PATELLAR TENDINITIS OF LEFT KNEE: Primary | ICD-10-CM

## 2019-09-12 DIAGNOSIS — D75.839 THROMBOCYTOSIS: ICD-10-CM

## 2019-09-12 DIAGNOSIS — Z12.39 BREAST CANCER SCREENING: ICD-10-CM

## 2019-09-12 DIAGNOSIS — E04.9 THYROID ENLARGEMENT: ICD-10-CM

## 2019-09-12 DIAGNOSIS — R00.0 TACHYCARDIA: ICD-10-CM

## 2019-09-12 PROBLEM — G43.109 COMPLICATED MIGRAINE: Status: ACTIVE | Noted: 2019-09-12

## 2019-09-12 PROBLEM — J45.909 ASTHMA: Status: ACTIVE | Noted: 2019-09-12

## 2019-09-12 PROBLEM — D25.9 UTERINE LEIOMYOMA: Status: ACTIVE | Noted: 2018-08-30

## 2019-09-12 PROBLEM — J45.909 ASTHMA (HCC): Status: ACTIVE | Noted: 2019-09-12

## 2019-09-12 LAB
ANION GAP SERPL CALC-SCNC: 6 MMOL/L (ref 0–18)
BASOPHILS # BLD AUTO: 0.05 X10(3) UL (ref 0–0.2)
BASOPHILS NFR BLD AUTO: 0.6 %
BUN BLD-MCNC: 5 MG/DL (ref 7–18)
BUN/CREAT SERPL: 6.1 (ref 10–20)
CALCIUM BLD-MCNC: 9.3 MG/DL (ref 8.5–10.1)
CHLORIDE SERPL-SCNC: 109 MMOL/L (ref 98–112)
CO2 SERPL-SCNC: 25 MMOL/L (ref 21–32)
CREAT BLD-MCNC: 0.82 MG/DL (ref 0.55–1.02)
DEPRECATED RDW RBC AUTO: 39.8 FL (ref 35.1–46.3)
EOSINOPHIL # BLD AUTO: 0.07 X10(3) UL (ref 0–0.7)
EOSINOPHIL NFR BLD AUTO: 0.9 %
ERYTHROCYTE [DISTWIDTH] IN BLOOD BY AUTOMATED COUNT: 13.9 % (ref 11–15)
GLUCOSE BLD-MCNC: 84 MG/DL (ref 70–99)
HCT VFR BLD AUTO: 40.7 % (ref 35–48)
HGB BLD-MCNC: 12.8 G/DL (ref 12–16)
IMM GRANULOCYTES # BLD AUTO: 0.03 X10(3) UL (ref 0–1)
IMM GRANULOCYTES NFR BLD: 0.4 %
LYMPHOCYTES # BLD AUTO: 2.01 X10(3) UL (ref 1–4)
LYMPHOCYTES NFR BLD AUTO: 24.8 %
MCH RBC QN AUTO: 24.7 PG (ref 26–34)
MCHC RBC AUTO-ENTMCNC: 31.4 G/DL (ref 31–37)
MCV RBC AUTO: 78.4 FL (ref 80–100)
MONOCYTES # BLD AUTO: 0.65 X10(3) UL (ref 0.1–1)
MONOCYTES NFR BLD AUTO: 8 %
NEUTROPHILS # BLD AUTO: 5.3 X10 (3) UL (ref 1.5–7.7)
NEUTROPHILS # BLD AUTO: 5.3 X10(3) UL (ref 1.5–7.7)
NEUTROPHILS NFR BLD AUTO: 65.3 %
OSMOLALITY SERPL CALC.SUM OF ELEC: 286 MOSM/KG (ref 275–295)
PATIENT FASTING: NO
PLATELET # BLD AUTO: 512 10(3)UL (ref 150–450)
POTASSIUM SERPL-SCNC: 3.7 MMOL/L (ref 3.5–5.1)
RBC # BLD AUTO: 5.19 X10(6)UL (ref 3.8–5.3)
SODIUM SERPL-SCNC: 140 MMOL/L (ref 136–145)
WBC # BLD AUTO: 8.1 X10(3) UL (ref 4–11)

## 2019-09-12 PROCEDURE — 84466 ASSAY OF TRANSFERRIN: CPT | Performed by: INTERNAL MEDICINE

## 2019-09-12 PROCEDURE — 99214 OFFICE O/P EST MOD 30 MIN: CPT | Performed by: INTERNAL MEDICINE

## 2019-09-12 PROCEDURE — 80048 BASIC METABOLIC PNL TOTAL CA: CPT | Performed by: INTERNAL MEDICINE

## 2019-09-12 PROCEDURE — 85025 COMPLETE CBC W/AUTO DIFF WBC: CPT | Performed by: INTERNAL MEDICINE

## 2019-09-12 PROCEDURE — 83540 ASSAY OF IRON: CPT | Performed by: INTERNAL MEDICINE

## 2019-09-12 NOTE — PROGRESS NOTES
Bita Altman is a 35year old female. Patient presents with: Follow - Up      HPI:           No c/o. Yesterday off amitryptyline. Never bleeding problems or blood clots. No asthma symptoms.  No coffee this am.   Zeny states she ate breakfast prior standard drinks      Comment: rare    Drug use: No         Review of System:  CONSTITUTION: denies fevers,  chills, or sweats, no dizziness  HEENT: No complaints  CARDIOVASCULAR: denies chest pain, denies palpitations  RESPIRATORY: no cough, denies shortne 15.0 %    .0 (H) 150.0 - 450.0 10(3)uL    Neutrophil Absolute Prelim 5.30 1.50 - 7.70 x10 (3) uL    Neutrophil Absolute 5.30 1.50 - 7.70 x10(3) uL    Lymphocyte Absolute 2.01 1.00 - 4.00 x10(3) uL    Monocyte Absolute 0.65 0.10 - 1.00 x10(3) uL    E corner of the infrapatellar fat (Hoffa fat-pad), interposed between the lateral aspect of the patellar tendon and the lateral trochlear ridge.  The findings suggest focal reactive edema which may be related to Patellar Tendon-Lateral Femoral Condyle Frictio

## 2019-09-12 NOTE — TELEPHONE ENCOUNTER
Lindwood Boxer from Dr. Alirio Masters office call to ask Dr. Konstantin Lyon if Ms. Sergo Russell is clear for knee surgery this Monday. Please call Ms. Peñaloza back. Thank you.

## 2019-09-13 ENCOUNTER — TELEPHONE (OUTPATIENT)
Dept: INTERNAL MEDICINE CLINIC | Facility: CLINIC | Age: 39
End: 2019-09-13

## 2019-09-13 LAB
IRON SATURATION: 11 % (ref 15–50)
IRON SERPL-MCNC: 49 UG/DL (ref 50–170)
TOTAL IRON BINDING CAPACITY: 432 UG/DL (ref 240–450)
TRANSFERRIN SERPL-MCNC: 290 MG/DL (ref 200–360)

## 2019-09-13 NOTE — TELEPHONE ENCOUNTER
Kenya Mendosa needs a signed EKG from the   / Stephani Puentes to :  [phone]       Above-noted. Also, patient called and advised to take [facility] 1 daily due to some iron deficiency.   She is not anemic

## 2019-09-23 RX ORDER — AMITRIPTYLINE HYDROCHLORIDE 25 MG/1
TABLET, FILM COATED ORAL
Qty: 90 TABLET | Refills: 0 | Status: SHIPPED | OUTPATIENT
Start: 2019-09-23 | End: 2019-10-16

## 2019-09-23 NOTE — TELEPHONE ENCOUNTER
Amitriptyline 25mg Take 3 tablets at HS.     Last filled-8/27/2019 with 1 refill    LOV-12/4/2018  NOV-10/16/2019

## 2019-09-30 ENCOUNTER — OFFICE VISIT (OUTPATIENT)
Dept: PHYSICAL THERAPY | Facility: HOSPITAL | Age: 39
End: 2019-09-30
Attending: PHYSICIAN ASSISTANT
Payer: COMMERCIAL

## 2019-09-30 DIAGNOSIS — M76.52 PATELLAR TENDONITIS OF LEFT KNEE: ICD-10-CM

## 2019-09-30 PROCEDURE — 97110 THERAPEUTIC EXERCISES: CPT

## 2019-09-30 PROCEDURE — 97161 PT EVAL LOW COMPLEX 20 MIN: CPT

## 2019-09-30 PROCEDURE — 97140 MANUAL THERAPY 1/> REGIONS: CPT

## 2019-09-30 NOTE — PROGRESS NOTES
POST-OP KNEE EVALUATION:   Referring Physician: Mario [facility]  Diagnosis: Patellar tendonitis of left knee (M76.52), s/p L patellar tendon debridement Date of Service: 9/30/2019  Sx date: 9/16/19     PATIENT SUMMARY   Toni luna a Isaias dee without reported pain. Skilled Physical Therapy is medically necessary to address the above impairments and reach functional goals.      Precautions:  HBP  OBJECTIVE:   Observation/Skin: Scab formation of incision site with clear sutures visibly sticking ou including changing pain levels.   PLAN OF CARE:    Goals: (To be met in 12 visits)   · Pt will improve knee extension ROM to 0 deg to allow proper heel strike during gait and terminal knee extension in stance  · Pt will improve knee AROM flexion to >125 deg

## 2019-10-04 ENCOUNTER — OFFICE VISIT (OUTPATIENT)
Dept: PHYSICAL THERAPY | Facility: HOSPITAL | Age: 39
End: 2019-10-04
Attending: PHYSICIAN ASSISTANT
Payer: COMMERCIAL

## 2019-10-04 PROCEDURE — 97110 THERAPEUTIC EXERCISES: CPT

## 2019-10-04 PROCEDURE — 97140 MANUAL THERAPY 1/> REGIONS: CPT

## 2019-10-04 NOTE — PROGRESS NOTES
Dx: Patellar tendonitis of left knee (M76.52), s/p L patellar tendon debridement       Insurance (Authorized # of Visits):  12 ([facility])           Authorizing Physician: Dr. Josephine Stern MD visit: 10/14/19  Precautions: HBP           Subjective: 4/10 TCJ manip  -L incision scar mobs  -L patellar glides all planes gr 3  -L tibial IR posterior glide gr 3       EX  -Knee flex ball rolls x30 LLE with hip in neutral  -Standing TKE RTB x20 hold 3 sec  -LAQ x20 hold 3 sec  -Shuttle x25 lbs x5 mnin  -Recumbent

## 2019-10-07 ENCOUNTER — OFFICE VISIT (OUTPATIENT)
Dept: PHYSICAL THERAPY | Facility: HOSPITAL | Age: 39
End: 2019-10-07
Attending: PHYSICIAN ASSISTANT
Payer: COMMERCIAL

## 2019-10-07 PROCEDURE — 97110 THERAPEUTIC EXERCISES: CPT

## 2019-10-07 NOTE — PROGRESS NOTES
Dx: Patellar tendonitis of left knee (M76.52), s/p L patellar tendon debridement       Insurance (Authorized # of Visits):  12 ([facility])           Authorizing Physician: Dr. Joselin Cooper  Next MD visit: 10/14/19  Precautions: HBP         Subjective: \"Most progress functional LLE strengthening. Date: 10/4/2019  TX#: 2/12 Date:  10/8/19               TX#: 3/12 Date:                 TX#: 4/12 Date:                 TX#: 5/12 Date:    Tx#: 6/12   MT  -L TCJ manip  -L incision scar mobs  -L patellar glides all pl

## 2019-10-09 ENCOUNTER — OFFICE VISIT (OUTPATIENT)
Dept: PHYSICAL THERAPY | Facility: HOSPITAL | Age: 39
End: 2019-10-09
Attending: PHYSICIAN ASSISTANT
Payer: COMMERCIAL

## 2019-10-09 PROCEDURE — 97110 THERAPEUTIC EXERCISES: CPT

## 2019-10-09 NOTE — PROGRESS NOTES
Dx: Patellar tendonitis of left knee (M76.52), s/p L patellar tendon debridement       Insurance (Authorized # of Visits):  12 ([facility])           Authorizing Physician: Dr. Barrington Donato  Next MD visit: 10/14/19  Precautions: HBP         Subjective: \"Most 4/12 Date:                 TX#: 5/12 Date:    Tx#: 6/12   MT  -L TCJ manip  -L incision scar mobs  -L patellar glides all planes gr 3  -L tibial IR posterior glide gr 3 Manual Therapy:  -L incision scar mobs  -L patellar glides all planes gr 3 Manual Therap

## 2019-10-15 ENCOUNTER — OFFICE VISIT (OUTPATIENT)
Dept: PHYSICAL THERAPY | Facility: HOSPITAL | Age: 39
End: 2019-10-15
Attending: PHYSICIAN ASSISTANT
Payer: COMMERCIAL

## 2019-10-15 PROCEDURE — 97110 THERAPEUTIC EXERCISES: CPT

## 2019-10-15 NOTE — PROGRESS NOTES
Dx: Patellar tendonitis of left knee (M76.52), s/p L patellar tendon debridement       Insurance (Authorized # of Visits):  12 ([employer] [facility] [employer])           Authorizing Physician: Dr. Leslie Stern MD visit: 10/14/19  Precautions: HBP         Subjective: \"I was will be independent and compliant with comprehensive HEP to maintain progress achieved in PT    Plan: Continue to restore knee flexion mobility, left knee stability and progress functional LLE strengthening.   Date:  10/7/19               TX#: 3/12 Date: 8 hip ext x 15                        Charges: EX 3      Total Timed Treatment: 45 min  Total Treatment Time: 47 min

## 2019-10-16 ENCOUNTER — TELEPHONE (OUTPATIENT)
Dept: NEUROLOGY | Facility: CLINIC | Age: 39
End: 2019-10-16

## 2019-10-16 ENCOUNTER — OFFICE VISIT (OUTPATIENT)
Dept: NEUROLOGY | Facility: CLINIC | Age: 39
End: 2019-10-16
Payer: COMMERCIAL

## 2019-10-16 VITALS
WEIGHT: 180 LBS | DIASTOLIC BLOOD PRESSURE: 80 MMHG | SYSTOLIC BLOOD PRESSURE: 122 MMHG | HEIGHT: 61 IN | HEART RATE: 100 BPM | BODY MASS INDEX: 33.99 KG/M2 | RESPIRATION RATE: 16 BRPM

## 2019-10-16 DIAGNOSIS — M54.16 LUMBAR RADICULOPATHY: ICD-10-CM

## 2019-10-16 DIAGNOSIS — R20.0 LEFT SIDED NUMBNESS: Primary | ICD-10-CM

## 2019-10-16 DIAGNOSIS — R25.1 TREMOR: ICD-10-CM

## 2019-10-16 DIAGNOSIS — R27.0 ATAXIA: ICD-10-CM

## 2019-10-16 PROCEDURE — 99214 OFFICE O/P EST MOD 30 MIN: CPT | Performed by: OTHER

## 2019-10-16 RX ORDER — AMITRIPTYLINE HYDROCHLORIDE 25 MG/1
TABLET, FILM COATED ORAL
Qty: 270 TABLET | Refills: 3 | Status: SHIPPED | OUTPATIENT
Start: 2019-10-16 | End: 2020-01-07

## 2019-10-16 NOTE — TELEPHONE ENCOUNTER
Called [facility] for authorization of approval for   MRI BRAIN (W+WO) CPT Code 39323- and- MRI SPINE CERVICAL (W+WO) CPT Code 87436 Per [employer] automated system it states authorization is required from [facility].    Reference # [id number]  Called [facility] for authorization fo

## 2019-10-16 NOTE — PROGRESS NOTES
[facility]    Patrica Barragan is a 35year old female. HPI:     Patient being seen in follow-up. I saw her in clinic last in December 2018. Since last visit, she is focused on new complaints.   She describes numbness of the fevers  HEENT: see HPI  RESPIRATORY: denies shortness of breath, wheezing or cough   CARDIOVASCULAR: denies chest pain or palpitations   MUSCULOSKELETAL: see HPI  PSYCH: no depression, no anxiety  NEURO: see HPI      PHYSICAL EXAM:     Vitals:  /80 ( lumbar spinal stenosis/radiculopathy can explain her proximal left leg weakness as well as some of the sensory symptoms in her left lower extremity, it does not explain her other symptoms of tremor, left hemibody numbness, or the other subtle focal deficit

## 2019-10-21 NOTE — TELEPHONE ENCOUNTER
Called [employer] for status of authorization of approval for MRI BRAIN (W+WO) CPT Code 70553-APPROVED and MRI SPINE CERVICAL (W+WO) CPT Code 72156-APPROVED. Spoke to Gissell Gant. who states both request above are APPROVED.  Authorization # [id number]   Effective dates:

## 2019-10-23 ENCOUNTER — APPOINTMENT (OUTPATIENT)
Dept: PHYSICAL THERAPY | Facility: HOSPITAL | Age: 39
End: 2019-10-23
Attending: PHYSICIAN ASSISTANT
Payer: COMMERCIAL

## 2019-10-24 ENCOUNTER — OFFICE VISIT (OUTPATIENT)
Dept: PHYSICAL THERAPY | Facility: HOSPITAL | Age: 39
End: 2019-10-24
Attending: PHYSICIAN ASSISTANT
Payer: COMMERCIAL

## 2019-10-24 PROCEDURE — 97110 THERAPEUTIC EXERCISES: CPT

## 2019-10-24 NOTE — PROGRESS NOTES
Dx: Patellar tendonitis of left knee (M76.52), s/p L patellar tendon debridement       Insurance (Authorized # of Visits):  12 ([employer] [facility] P[employer])           Authorizing Physician: Dr. Seema Stern MD visit: 10/14/19  Precautions: HBP         FOTO: Margaret 9/30/14 IR/ADD-In Progressed  · Pt will improve SLS to >10s to improve safety and independence with gait on uneven surfaces such as grass-In Progressed  · Pt will be independent and compliant with comprehensive HEP to maintain progress achieved in PT-Partially MET (very tired and fatigued no c/o pain)  -Supine QS 15 x 5 sec hold  -Knee flex ball rolls x30 LLE with hip in neutral  -Knee flex ball roll x 30 DL with hip natural  -Bridging with ball squeeze 20 x 5 sec hold  -Supine TKE with 1# ankle wt x20 hold 3 sec  -

## 2019-10-29 ENCOUNTER — APPOINTMENT (OUTPATIENT)
Dept: PHYSICAL THERAPY | Facility: HOSPITAL | Age: 39
End: 2019-10-29
Attending: PHYSICIAN ASSISTANT
Payer: COMMERCIAL

## 2019-10-30 ENCOUNTER — APPOINTMENT (OUTPATIENT)
Dept: PHYSICAL THERAPY | Facility: HOSPITAL | Age: 39
End: 2019-10-30
Attending: PHYSICIAN ASSISTANT
Payer: COMMERCIAL

## 2019-10-31 ENCOUNTER — OFFICE VISIT (OUTPATIENT)
Dept: PHYSICAL THERAPY | Facility: HOSPITAL | Age: 39
End: 2019-10-31
Attending: PHYSICIAN ASSISTANT
Payer: COMMERCIAL

## 2019-10-31 PROCEDURE — 97110 THERAPEUTIC EXERCISES: CPT

## 2019-10-31 PROCEDURE — 97140 MANUAL THERAPY 1/> REGIONS: CPT

## 2019-10-31 NOTE — PROGRESS NOTES
Dx: Patellar tendonitis of left knee (M76.52), s/p L patellar tendon debridement       Insurance (Authorized # of Visits):  12 ([facility])           Authorizing Physician: Dr. Nicole Stern MD visit: 10/14/19  Precautions: HBP         FOTO: Margaret 9/30/14 maintain progress achieved in PT-Partially MET    Plan:Continue skilled Physical Therapy 2 x/week or a total of 8 more visits over a 90 day period.  Treatment will include: L knee restore flexion mobility, Left knee  stabilization and progressed functional pain)  -Supine QS 15 x 5 sec hold  -Knee flex ball rolls x30 LLE with hip in neutral  -Knee flex ball roll x 30 DL with hip natural  -Bridging with ball squeeze 20 x 5 sec hold  -Supine TKE with 1# ankle wt x20 hold 3 sec  -Supine SLR x 15 with  -Supine ha

## 2019-11-09 ENCOUNTER — HOSPITAL ENCOUNTER (OUTPATIENT)
Dept: MRI IMAGING | Facility: HOSPITAL | Age: 39
Discharge: HOME | End: 2019-11-09
Attending: Other
Payer: COMMERCIAL

## 2019-11-09 DIAGNOSIS — R27.0 ATAXIA: ICD-10-CM

## 2019-11-09 DIAGNOSIS — M54.16 LUMBAR RADICULOPATHY: ICD-10-CM

## 2019-11-09 DIAGNOSIS — R25.1 TREMOR: ICD-10-CM

## 2019-11-09 DIAGNOSIS — R20.0 LEFT SIDED NUMBNESS: ICD-10-CM

## 2019-11-09 PROCEDURE — A9575 INJ GADOTERATE MEGLUMI 0.1ML: HCPCS | Performed by: OTHER

## 2019-11-09 PROCEDURE — 70553 MRI BRAIN STEM W/O & W/DYE: CPT | Performed by: OTHER

## 2019-11-09 PROCEDURE — 72156 MRI NECK SPINE W/O & W/DYE: CPT | Performed by: OTHER

## 2019-11-19 ENCOUNTER — OFFICE VISIT (OUTPATIENT)
Dept: PHYSICAL THERAPY | Facility: HOSPITAL | Age: 39
End: 2019-11-19
Attending: PHYSICIAN ASSISTANT
Payer: COMMERCIAL

## 2019-11-19 PROCEDURE — 97110 THERAPEUTIC EXERCISES: CPT

## 2019-11-19 NOTE — PROGRESS NOTES
Dx: Patellar tendonitis of left knee (M76.52), s/p L patellar tendon debridement       Insurance (Authorized # of Visits):  12 ([facility])           Authorizing Physician: Dr. Joselin Stern MD visit: 10/14/19  Precautions: HBP         FOTO: Margaret 9/30/14 compliant with comprehensive HEP to maintain progress achieved in PT-Partially MET    Plan: Continued to PT for L knee stabilization, strengthening and standing balance activity to improved functional mobility.      Date:  10/7/19               TX#: 3/8 Manuel squeeze 20 x 5 sec hold  -Supine TKE with 1# ankle wt x20 hold 3 sec  -Supine SLR x 15 with  -Supine hamstring stretch 4 x 20 sec hold  -Supine hip abd with RTB 20 x 5 sec hold  -Prone hamstring curl 15 x 3 sec hold  -Prone hip ext x 15   EX:  -Recumbent b

## 2019-11-21 ENCOUNTER — OFFICE VISIT (OUTPATIENT)
Dept: PHYSICAL THERAPY | Facility: HOSPITAL | Age: 39
End: 2019-11-21
Attending: PHYSICIAN ASSISTANT
Payer: COMMERCIAL

## 2019-11-21 PROCEDURE — 97110 THERAPEUTIC EXERCISES: CPT

## 2019-11-21 NOTE — PROGRESS NOTES
Dx: Patellar tendonitis of left knee (M76.52), s/p L patellar tendon debridement       Insurance (Authorized # of Visits):  12 ([facility])           Authorizing Physician: Dr. Frances Stern MD visit: 10/14/19  Precautions: HBP         FOTO: Margaret 9/30/14 improve safety and independence with gait on uneven surfaces such as grass-In Progressed  · Pt will be independent and compliant with comprehensive HEP to maintain progress achieved in PT-Partially MET    Plan: Continued to PT for L knee stabilization, str reverse step down 2x10 bilat 6 in  -STS training focusing on eccentric lowering 4 sec 2x10   EX:  -Recumbent bike x8 min seat #4  -Shuttle x65 lbs x5 min  -Standing heel raises x 20  -TANIA L3 calf stretch x3 min  -6inch fwd/lat step down x 20 (GTB therapi

## 2019-11-26 ENCOUNTER — TELEPHONE (OUTPATIENT)
Dept: PHYSICAL THERAPY | Facility: HOSPITAL | Age: 39
End: 2019-11-26

## 2019-12-03 ENCOUNTER — OFFICE VISIT (OUTPATIENT)
Dept: PHYSICAL THERAPY | Facility: HOSPITAL | Age: 39
End: 2019-12-03
Attending: PHYSICIAN ASSISTANT
Payer: COMMERCIAL

## 2019-12-03 PROCEDURE — 97110 THERAPEUTIC EXERCISES: CPT

## 2019-12-03 NOTE — PROGRESS NOTES
Dx: Patellar tendonitis of left knee (M76.52), s/p L patellar tendon debridement       Insurance (Authorized # of Visits):  12 ([employer] [facility] [employer] PPO)           Authorizing Physician: Dr. Jennifer Stern MD visit: 10/14/19  Precautions: HBP         FOTO: Margaret 9/30/14 PT-Partially MET    Plan: Continued to PT for L knee stabilization, strengthening and standing balance activity to improved functional mobility.      Date: 10/31/19  Tx#: 7/8 Date: 11/19/19  Tx#: 8/8 Date: 11/21/19  Tx#: 9 Date: 12/03/19  Tx#: 9   Manual Th and slow reverse step downs                Charges: EX 3     Total Timed Treatment: 42 min  Total Treatment Time: 45 min

## 2019-12-05 ENCOUNTER — OFFICE VISIT (OUTPATIENT)
Dept: PHYSICAL THERAPY | Facility: HOSPITAL | Age: 39
End: 2019-12-05
Attending: PHYSICIAN ASSISTANT
Payer: COMMERCIAL

## 2019-12-05 PROCEDURE — 97110 THERAPEUTIC EXERCISES: CPT

## 2019-12-05 NOTE — PROGRESS NOTES
Dx: Patellar tendonitis of left knee (M76.52), s/p L patellar tendon debridement       Insurance (Authorized # of Visits):  12 ([employer] [facility] [employer] PPO)           Authorizing Physician: Dr. Vicky Stern MD visit: 10/14/19  Precautions: HBP         FOTO: Margaret 9/30/14 mobility.      Date: 11/19/19  Tx#: 8/8 Date: 11/21/19  Tx#: 9 Date: 12/03/19  Tx#: 9 Date: 12/05/19  Tx#: 10   Manual Therapy:  Manual Therapy:  -L incision scar mobs  -L patellar glides all planes gr 3  -L hip hormonic  - -   EX:  -Recumbent bike x8 min s

## 2019-12-10 ENCOUNTER — TELEPHONE (OUTPATIENT)
Dept: PHYSICAL THERAPY | Facility: HOSPITAL | Age: 39
End: 2019-12-10

## 2019-12-12 ENCOUNTER — TELEPHONE (OUTPATIENT)
Dept: PHYSICAL THERAPY | Facility: HOSPITAL | Age: 39
End: 2019-12-12

## 2019-12-17 ENCOUNTER — APPOINTMENT (OUTPATIENT)
Dept: PHYSICAL THERAPY | Facility: HOSPITAL | Age: 39
End: 2019-12-17
Attending: PHYSICIAN ASSISTANT
Payer: COMMERCIAL

## 2020-01-07 ENCOUNTER — OFFICE VISIT (OUTPATIENT)
Dept: NEUROLOGY | Facility: CLINIC | Age: 40
End: 2020-01-07
Payer: COMMERCIAL

## 2020-01-07 VITALS
RESPIRATION RATE: 16 BRPM | DIASTOLIC BLOOD PRESSURE: 80 MMHG | WEIGHT: 164 LBS | BODY MASS INDEX: 30.96 KG/M2 | HEART RATE: 100 BPM | SYSTOLIC BLOOD PRESSURE: 126 MMHG | HEIGHT: 61 IN

## 2020-01-07 DIAGNOSIS — R25.1 TREMOR: ICD-10-CM

## 2020-01-07 DIAGNOSIS — G43.009 MIGRAINE WITHOUT AURA AND WITHOUT STATUS MIGRAINOSUS, NOT INTRACTABLE: ICD-10-CM

## 2020-01-07 DIAGNOSIS — R20.0 LEFT SIDED NUMBNESS: Primary | ICD-10-CM

## 2020-01-07 DIAGNOSIS — R53.1 LEFT-SIDED WEAKNESS: ICD-10-CM

## 2020-01-07 DIAGNOSIS — M54.16 LUMBAR RADICULOPATHY: ICD-10-CM

## 2020-01-07 PROCEDURE — 99214 OFFICE O/P EST MOD 30 MIN: CPT | Performed by: OTHER

## 2020-01-07 RX ORDER — AMITRIPTYLINE HYDROCHLORIDE 100 MG/1
100 TABLET, FILM COATED ORAL NIGHTLY
Qty: 30 TABLET | Refills: 3 | Status: SHIPPED | OUTPATIENT
Start: 2020-01-07 | End: 2020-06-22

## 2020-01-07 RX ORDER — SUMATRIPTAN 50 MG/1
TABLET, FILM COATED ORAL
Qty: 9 TABLET | Refills: 3 | Status: SHIPPED | OUTPATIENT
Start: 2020-01-07 | End: 2020-12-23

## 2020-01-09 NOTE — PROGRESS NOTES
Neurology [facility] Follow-up Note    Ervin Arroyo is a 35year old female. HPI:     Patient being seen in follow-up. I saw her in clinic last in October 2019. Since last visit, interval work-up reviewed, see below.     She does continue to have HPI  RESPIRATORY: denies shortness of breath, wheezing or cough   CARDIOVASCULAR: denies chest pain or palpitations   MUSCULOSKELETAL: see HPI  NEURO: see HPI      PHYSICAL EXAM:     Vitals:  /80 (BP Location: Left arm, Patient Position: Sitting, Cuf 5. No acute osseous injury of the cervical spine. MRI brain, images and report reviewed  CONCLUSION:   1. No acute intracranial process is identified.        2. Partially empty sella, which may reflect a normal variant, but can also be seen in the se

## 2020-01-16 ENCOUNTER — PROCEDURE VISIT (OUTPATIENT)
Dept: NEUROLOGY | Facility: CLINIC | Age: 40
End: 2020-01-16
Payer: COMMERCIAL

## 2020-01-16 DIAGNOSIS — G56.02 CARPAL TUNNEL SYNDROME OF LEFT WRIST: ICD-10-CM

## 2020-01-16 DIAGNOSIS — R20.0 LEFT SIDED NUMBNESS: Primary | ICD-10-CM

## 2020-01-16 PROCEDURE — 95886 MUSC TEST DONE W/N TEST COMP: CPT | Performed by: OTHER

## 2020-01-16 PROCEDURE — 95909 NRV CNDJ TST 5-6 STUDIES: CPT | Performed by: OTHER

## 2020-01-16 NOTE — PROCEDURES
HISTORY:    Prachi Mckeon is a 35year old female with a complaint of numbness in the left side including arm. PHYSICAL:    Cranial nerves grossly normal. Motor examination showed strength to be 5 of 5 throughout.      Sensory examination showed decreas Wrist ADM 2.86 7.5 100 6.61 Wrist - ADM 8        B. Elbow ADM 6.25 10.3 137 7.03 B. Elbow - Wrist 18 3.39 53      A. Elbow ADM 8.13 10.4 101 7.19 A. Elbow - B. Elbow 10 1.88 53       Sensory NCS      Nerve / Sites Rec.  Site Onset Lat Peak Lat NP Amp PP Amp S These abnormal findings are consistent with the patient's clinical complaints of numbness in the left hand on peripheral nerve or muscle basis. However it would not explain the numbness in the rest of the body.     Cristy Dubon MD

## 2020-02-03 ENCOUNTER — TELEPHONE (OUTPATIENT)
Dept: INTERNAL MEDICINE CLINIC | Facility: CLINIC | Age: 40
End: 2020-02-03

## 2020-02-15 ENCOUNTER — HOSPITAL ENCOUNTER (OUTPATIENT)
Dept: MAMMOGRAPHY | Facility: HOSPITAL | Age: 40
Discharge: HOME | End: 2020-02-15
Attending: INTERNAL MEDICINE
Payer: COMMERCIAL

## 2020-02-15 DIAGNOSIS — Z12.39 BREAST CANCER SCREENING: ICD-10-CM

## 2020-02-15 PROCEDURE — 77063 BREAST TOMOSYNTHESIS BI: CPT | Performed by: INTERNAL MEDICINE

## 2020-02-15 PROCEDURE — 77067 SCR MAMMO BI INCL CAD: CPT | Performed by: INTERNAL MEDICINE

## 2020-03-16 ENCOUNTER — OFFICE VISIT (OUTPATIENT)
Dept: INTERNAL MEDICINE CLINIC | Facility: CLINIC | Age: 40
End: 2020-03-16
Payer: COMMERCIAL

## 2020-03-16 VITALS
TEMPERATURE: 99 F | BODY MASS INDEX: 34.36 KG/M2 | OXYGEN SATURATION: 99 % | HEIGHT: 61 IN | RESPIRATION RATE: 22 BRPM | WEIGHT: 182 LBS | DIASTOLIC BLOOD PRESSURE: 84 MMHG | SYSTOLIC BLOOD PRESSURE: 128 MMHG | HEART RATE: 137 BPM

## 2020-03-16 DIAGNOSIS — R80.9 PROTEINURIA, UNSPECIFIED TYPE: ICD-10-CM

## 2020-03-16 DIAGNOSIS — R51.9 FREQUENT HEADACHES: ICD-10-CM

## 2020-03-16 DIAGNOSIS — D75.839 THROMBOCYTOSIS: ICD-10-CM

## 2020-03-16 DIAGNOSIS — R35.89 POLYURIA: Primary | ICD-10-CM

## 2020-03-16 DIAGNOSIS — R63.1 POLYDIPSIA: ICD-10-CM

## 2020-03-16 DIAGNOSIS — E61.1 IRON DEFICIENCY: ICD-10-CM

## 2020-03-16 DIAGNOSIS — R00.0 TACHYCARDIA: ICD-10-CM

## 2020-03-16 LAB
ALBUMIN SERPL-MCNC: 2.7 G/DL (ref 3.4–5)
ALBUMIN/GLOB SERPL: 0.5 {RATIO} (ref 1–2)
ALP LIVER SERPL-CCNC: 114 U/L (ref 37–98)
ALT SERPL-CCNC: 40 U/L (ref 13–56)
ANION GAP SERPL CALC-SCNC: 9 MMOL/L (ref 0–18)
AST SERPL-CCNC: 18 U/L (ref 15–37)
BASOPHILS # BLD AUTO: 0.05 X10(3) UL (ref 0–0.2)
BASOPHILS NFR BLD AUTO: 0.5 %
BILIRUB SERPL-MCNC: 0.2 MG/DL (ref 0.1–2)
BILIRUBIN: NEGATIVE
BUN BLD-MCNC: 13 MG/DL (ref 7–18)
BUN/CREAT SERPL: 10.3 (ref 10–20)
CALCIUM BLD-MCNC: 9 MG/DL (ref 8.5–10.1)
CHLORIDE SERPL-SCNC: 106 MMOL/L (ref 98–112)
CO2 SERPL-SCNC: 25 MMOL/L (ref 21–32)
CREAT BLD-MCNC: 1.26 MG/DL (ref 0.55–1.02)
DEPRECATED RDW RBC AUTO: 41.6 FL (ref 35.1–46.3)
EOSINOPHIL # BLD AUTO: 0.14 X10(3) UL (ref 0–0.7)
EOSINOPHIL NFR BLD AUTO: 1.5 %
ERYTHROCYTE [DISTWIDTH] IN BLOOD BY AUTOMATED COUNT: 15.4 % (ref 11–15)
EST. AVERAGE GLUCOSE BLD GHB EST-MCNC: 120 MG/DL (ref 68–126)
GLOBULIN PLAS-MCNC: 5.8 G/DL (ref 2.8–4.4)
GLUCOSE (URINE DIPSTICK): 100 MG/DL
GLUCOSE BLD-MCNC: 54 MG/DL (ref 70–99)
HBA1C MFR BLD HPLC: 5.8 % (ref ?–5.7)
HCT VFR BLD AUTO: 37.8 % (ref 35–48)
HGB BLD-MCNC: 11.6 G/DL (ref 12–16)
HYALINE CASTS #/AREA URNS AUTO: 1 /LPF
IMM GRANULOCYTES # BLD AUTO: 0.02 X10(3) UL (ref 0–1)
IMM GRANULOCYTES NFR BLD: 0.2 %
KETONES (URINE DIPSTICK): NEGATIVE MG/DL
LYMPHOCYTES # BLD AUTO: 1.99 X10(3) UL (ref 1–4)
LYMPHOCYTES NFR BLD AUTO: 21.4 %
M PROTEIN MFR SERPL ELPH: 8.5 G/DL (ref 6.4–8.2)
MCH RBC QN AUTO: 23.7 PG (ref 26–34)
MCHC RBC AUTO-ENTMCNC: 30.7 G/DL (ref 31–37)
MCV RBC AUTO: 77.3 FL (ref 80–100)
MONOCYTES # BLD AUTO: 0.78 X10(3) UL (ref 0.1–1)
MONOCYTES NFR BLD AUTO: 8.4 %
MULTISTIX LOT#: NORMAL NUMERIC
NEUTROPHILS # BLD AUTO: 6.32 X10 (3) UL (ref 1.5–7.7)
NEUTROPHILS # BLD AUTO: 6.32 X10(3) UL (ref 1.5–7.7)
NEUTROPHILS NFR BLD AUTO: 68 %
NITRITE, URINE: NEGATIVE
OSMOLALITY SERPL CALC.SUM OF ELEC: 288 MOSM/KG (ref 275–295)
PATIENT FASTING Y/N/NP: NO
PH, URINE: 6 (ref 4.5–8)
PLATELET # BLD AUTO: 485 10(3)UL (ref 150–450)
POTASSIUM SERPL-SCNC: 3.2 MMOL/L (ref 3.5–5.1)
PROLACTIN SERPL-MCNC: 33.7 NG/ML
PROTEIN (URINE DIPSTICK): 300 MG/DL
RBC # BLD AUTO: 4.89 X10(6)UL (ref 3.8–5.3)
RBC #/AREA URNS AUTO: 1 /HPF
SODIUM SERPL-SCNC: 140 MMOL/L (ref 136–145)
SPECIFIC GRAVITY: 1.02 (ref 1–1.03)
TSI SER-ACNC: 1.28 MIU/ML (ref 0.36–3.74)
URINE-COLOR: YELLOW
UROBILINOGEN,SEMI-QN: 0.2 MG/DL (ref 0–1.9)
VIT B12 SERPL-MCNC: 422 PG/ML (ref 193–986)
WBC # BLD AUTO: 9.3 X10(3) UL (ref 4–11)
WBC #/AREA URNS AUTO: 3 /HPF

## 2020-03-16 PROCEDURE — 36415 COLL VENOUS BLD VENIPUNCTURE: CPT | Performed by: INTERNAL MEDICINE

## 2020-03-16 PROCEDURE — 87086 URINE CULTURE/COLONY COUNT: CPT | Performed by: INTERNAL MEDICINE

## 2020-03-16 PROCEDURE — 87077 CULTURE AEROBIC IDENTIFY: CPT | Performed by: INTERNAL MEDICINE

## 2020-03-16 PROCEDURE — 84146 ASSAY OF PROLACTIN: CPT | Performed by: INTERNAL MEDICINE

## 2020-03-16 PROCEDURE — 80050 GENERAL HEALTH PANEL: CPT | Performed by: INTERNAL MEDICINE

## 2020-03-16 PROCEDURE — 83036 HEMOGLOBIN GLYCOSYLATED A1C: CPT | Performed by: INTERNAL MEDICINE

## 2020-03-16 PROCEDURE — 82607 VITAMIN B-12: CPT | Performed by: INTERNAL MEDICINE

## 2020-03-16 PROCEDURE — 99214 OFFICE O/P EST MOD 30 MIN: CPT | Performed by: INTERNAL MEDICINE

## 2020-03-16 PROCEDURE — 81003 URINALYSIS AUTO W/O SCOPE: CPT | Performed by: INTERNAL MEDICINE

## 2020-03-16 NOTE — PROGRESS NOTES
Pt presented to clinic today for blood draw. Per physician able to draw orders. Orders  documented within chart. Pt tolerated lab draw well.  verified.   Orders drawn include:cmp, cbc, a1c, vit b12, tsh, prola,   Site of draw: [place]OREEN [place]راقي arm

## 2020-03-16 NOTE — PROGRESS NOTES
Edilberto Gilbert is a 35year old female.   Patient presents with:  Urinary Frequency: pt in c/o freqeunt urination, dry mouth, feeling more hungry       HPI:         2 weeks ago felt like \"body went haywire\", craving for sweet juice and water; sudden epi Surgical History:   Procedure Laterality Date   • ARTHROSCOPY KNEE LEFT Left 2019    Performed by Elif Rodriguez MD at [facility]   •      • KNEE ARTHROSCOPY Left 5/15/2019    Performed by Elif Rodriguez MD at [address] [place] [address] equal round reactive to light and accommodation, extraocular muscles intact. Conjunctive pink, sclerae anicteric  Neck-supple, no carotid bruits, no adenopathy. Thyroid normal.  Lungs-clear to auscultation  Heart-S1-S2 normal, no S3 or murmur.   Rhythm re patients over the age of 36, the target due date for the patient's next mammogram has been entered into a reminder system.    Patient received a discharge summary from the technologist after completion of exam.  Breast marker legend used on images  [address] type  Plan: To check labs and urine as above    (R00.0) Tachycardia  Plan: Patient advised to drink juices and other fluids than only water. No chest pain or cardiac symptoms    (R51) Frequent headaches  Plan:  To discuss with neurology pending labs    (E6

## 2020-04-01 ENCOUNTER — VIRTUAL PHONE E/M (OUTPATIENT)
Dept: INTERNAL MEDICINE CLINIC | Facility: CLINIC | Age: 40
End: 2020-04-01

## 2020-04-01 ENCOUNTER — TELEPHONE (OUTPATIENT)
Dept: INTERNAL MEDICINE CLINIC | Facility: CLINIC | Age: 40
End: 2020-04-01

## 2020-04-01 DIAGNOSIS — R73.09 ELEVATED HEMOGLOBIN A1C: ICD-10-CM

## 2020-04-01 DIAGNOSIS — E16.1 FASTING HYPOGLYCEMIA: ICD-10-CM

## 2020-04-01 DIAGNOSIS — R77.9 ELEVATED SERUM PROTEIN LEVEL: ICD-10-CM

## 2020-04-01 DIAGNOSIS — R00.0 TACHYCARDIA: ICD-10-CM

## 2020-04-01 DIAGNOSIS — R80.9 PROTEINURIA, UNSPECIFIED TYPE: ICD-10-CM

## 2020-04-01 DIAGNOSIS — R63.1 POLYDIPSIA: Primary | ICD-10-CM

## 2020-04-01 PROCEDURE — 99213 OFFICE O/P EST LOW 20 MIN: CPT | Performed by: INTERNAL MEDICINE

## 2020-04-01 RX ORDER — BLOOD SUGAR DIAGNOSTIC
STRIP MISCELLANEOUS
Qty: 100 STRIP | Refills: 3 | Status: SHIPPED | OUTPATIENT
Start: 2020-04-01 | End: 2020-10-02

## 2020-04-01 NOTE — TELEPHONE ENCOUNTER
Virtual/Telephone Check-In    Cedric Schaffer verbally consents to a Virtual/Telephone Check-In service on 04/01/20. Patient understands and accepts financial responsibility for any deductible, co-insurance and/or co-pays associated with this service.     D

## 2020-04-02 ENCOUNTER — TELEPHONE (OUTPATIENT)
Dept: NEPHROLOGY | Facility: CLINIC | Age: 40
End: 2020-04-02

## 2020-04-02 NOTE — TELEPHONE ENCOUNTER
New pt scheduled consult with Dr. Lynn Segal for 4/27/30 due to possible kidney disease. Pt was referred by PCP.  Is it ok for pt to keep appt or reschedule after May 1st. Please call pt [phone]

## 2020-04-27 RX ORDER — METOPROLOL SUCCINATE 25 MG/1
12.5 TABLET, EXTENDED RELEASE ORAL DAILY
Qty: 15 TABLET | Refills: 1 | Status: SHIPPED | OUTPATIENT
Start: 2020-04-27 | End: 2020-06-11

## 2020-04-29 ENCOUNTER — TELEPHONE (OUTPATIENT)
Dept: ENDOCRINOLOGY CLINIC | Facility: CLINIC | Age: 40
End: 2020-04-29

## 2020-05-09 ENCOUNTER — TELEPHONE (OUTPATIENT)
Dept: ENDOCRINOLOGY CLINIC | Facility: CLINIC | Age: 40
End: 2020-05-09

## 2020-05-09 ENCOUNTER — TELEMEDICINE (OUTPATIENT)
Dept: ENDOCRINOLOGY CLINIC | Facility: CLINIC | Age: 40
End: 2020-05-09
Payer: COMMERCIAL

## 2020-05-09 DIAGNOSIS — R63.2 EXCESSIVE HUNGER: ICD-10-CM

## 2020-05-09 DIAGNOSIS — R35.89 POLYURIA: ICD-10-CM

## 2020-05-09 DIAGNOSIS — R73.03 PRE-DIABETES: Primary | ICD-10-CM

## 2020-05-09 DIAGNOSIS — E16.2 LOW BLOOD SUGAR: ICD-10-CM

## 2020-05-09 PROCEDURE — 99243 OFF/OP CNSLTJ NEW/EST LOW 30: CPT | Performed by: INTERNAL MEDICINE

## 2020-05-09 NOTE — H&P
Yaritza Ashley verbally consents to a video visit on 5/9/2020     Patient understands and accepts financial responsibility for any deductible, co-insurance and/or co-pays associated with this service.     This visit is conducted using Telemedicine with live, 5/15/2019    Performed by Denzel Perry MD at [facility] OR   • KNEE DEBRIDEMENT Left 9/16/2019    Performed by Denzel Perry MD at [address] [place] [address]       CURRENT MEDICATIONS:    Current Outpatient Medications   Medication Sig Dispense daily        Exercise: No    Social History Narrative      The patient does not use an assistive device. .        The patient does not live in a home with stairs.       FAMILY HISTORY:   Family History   Problem Relation Age of Onset   • Diabetes Mother    • Pertinent data reviewed      ASSESSMENT AND PLAN:    Patient is a 38year old female with:     1. Hypoglycemia on BMP  + excess hunger  + pre diabetes     I discussed the following with her:   The diagnosis of a true hypoglycemic disorder requires a low was taken to allow for sufficient and adequate time. This billing was spent on reviewing labs, medications, radiology tests and decision making.   Appropriate medical decision-making and tests are ordered as detailed in the plan of care above.”

## 2020-05-09 NOTE — TELEPHONE ENCOUNTER
Please call patient and schedule for:     1. Nurse visit for CGM placement  2.  Visit with CDE in 7-10 days after CGM placement for download    Thanks

## 2020-05-11 ENCOUNTER — NURSE ONLY (OUTPATIENT)
Dept: ENDOCRINOLOGY CLINIC | Facility: CLINIC | Age: 40
End: 2020-05-11
Payer: COMMERCIAL

## 2020-05-11 DIAGNOSIS — E11.65 UNCONTROLLED TYPE 2 DIABETES MELLITUS WITH HYPERGLYCEMIA (HCC): Primary | ICD-10-CM

## 2020-05-11 PROCEDURE — 95250 CONT GLUC MNTR PHYS/QHP EQP: CPT | Performed by: INTERNAL MEDICINE

## 2020-05-11 NOTE — PROGRESS NOTES
Patient here for Professional Nichelle pro placement. Placed on R upper arm. She will return in 7 days for download.

## 2020-05-18 ENCOUNTER — NURSE ONLY (OUTPATIENT)
Dept: ENDOCRINOLOGY CLINIC | Facility: CLINIC | Age: 40
End: 2020-05-18
Payer: COMMERCIAL

## 2020-05-18 ENCOUNTER — LAB ENCOUNTER (OUTPATIENT)
Dept: LAB | Facility: HOSPITAL | Age: 40
End: 2020-05-18
Attending: INTERNAL MEDICINE
Payer: COMMERCIAL

## 2020-05-18 DIAGNOSIS — E16.2 HYPOGLYCEMIA: Primary | ICD-10-CM

## 2020-05-18 PROCEDURE — 86337 INSULIN ANTIBODIES: CPT

## 2020-05-18 PROCEDURE — 83525 ASSAY OF INSULIN: CPT

## 2020-05-18 PROCEDURE — 84206 ASSAY OF PROINSULIN: CPT

## 2020-05-18 PROCEDURE — 95251 CONT GLUC MNTR ANALYSIS I&R: CPT | Performed by: INTERNAL MEDICINE

## 2020-05-18 PROCEDURE — 80048 BASIC METABOLIC PNL TOTAL CA: CPT

## 2020-05-18 PROCEDURE — 36415 COLL VENOUS BLD VENIPUNCTURE: CPT

## 2020-05-18 PROCEDURE — 82010 KETONE BODYS QUAN: CPT

## 2020-05-18 PROCEDURE — 84681 ASSAY OF C-PEPTIDE: CPT

## 2020-05-18 PROCEDURE — 80377 DRUG/SUBSTANCE NOS 7/MORE: CPT

## 2020-05-18 RX ORDER — FLASH GLUCOSE SENSOR
1 KIT MISCELLANEOUS
Qty: 2 EACH | Refills: 2 | Status: SHIPPED | OUTPATIENT
Start: 2020-05-18 | End: 2020-10-02

## 2020-05-18 NOTE — PROGRESS NOTES
Emre Navarrete was seen for CGM follow up: Freestyle Nichelle professional     Date: 5/18/2020         Start time: 9:00 AM  End time: 9:30 AM     Patient seen for [employer]. She was seen by AM for consult for hypoglycemia and prediabetes.  She was concer hypoglycemia  > Blood work ordered: Insulin AB , C peptide, Insulin, Pro insulin, VEGA screen, beta hydroxy butyrate   Discussed that the above testing will have limited utility since her current BG is not under 70.   > Reviewed the option of getting admitte

## 2020-05-21 ENCOUNTER — VIRTUAL PHONE E/M (OUTPATIENT)
Dept: NEPHROLOGY | Facility: CLINIC | Age: 40
End: 2020-05-21

## 2020-05-21 ENCOUNTER — TELEPHONE (OUTPATIENT)
Dept: NEPHROLOGY | Facility: CLINIC | Age: 40
End: 2020-05-21

## 2020-05-21 DIAGNOSIS — E16.2 LOW BLOOD SUGAR: Primary | ICD-10-CM

## 2020-05-21 PROBLEM — R80.0 ISOLATED PROTEINURIA: Status: ACTIVE | Noted: 2020-05-21

## 2020-05-21 PROCEDURE — 99243 OFF/OP CNSLTJ NEW/EST LOW 30: CPT | Performed by: INTERNAL MEDICINE

## 2020-05-22 NOTE — PROGRESS NOTES
Virtual Telephone Check-In    Carmela Lopez verballyconsents  a Virtual/Telephone Check-In visit on 05/21/20. Patient has been referred to the [facility] website at [email or web address] to review the yearly Consent to Treat document.     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

## 2020-05-22 NOTE — TELEPHONE ENCOUNTER
Fernando Stanton  Please see my note on this kristopher lady any questions give me a call    Thanks so much for the referral    Jones Gains

## 2020-06-03 ENCOUNTER — TELEPHONE (OUTPATIENT)
Dept: INTERNAL MEDICINE CLINIC | Facility: CLINIC | Age: 40
End: 2020-06-03

## 2020-06-04 ENCOUNTER — TELEPHONE (OUTPATIENT)
Dept: ENDOCRINOLOGY CLINIC | Facility: CLINIC | Age: 40
End: 2020-06-04

## 2020-06-04 NOTE — TELEPHONE ENCOUNTER
Patient has non diabetic hypoglycemia  BMP shows a BG of 77 which is low normal, but normal  Insulin AB is negative  C peptide and insulin levels are not elevated  K is slightly low: have an orange . banan daily.  FU with PCP for repeat    She was given the

## 2020-06-05 NOTE — TELEPHONE ENCOUNTER
NISHA Turk I spoke with Ángel Gay (verified name and ). I discussed they test results below and treatment options in detail. Ángel Gay states she would not like to do the 72 hour observation and she would not like to start metformin.  She would like t

## 2020-06-05 NOTE — TELEPHONE ENCOUNTER
I apologize I forgot to include sugar discussion in my previous note. Patient states she is not recording BG but using oneyda to check. Sugars have been ranging  at home. Highest was 124. No sugars less than 70.      I did send her a [employer] messag

## 2020-06-11 RX ORDER — METOPROLOL SUCCINATE 25 MG/1
TABLET, EXTENDED RELEASE ORAL
Qty: 15 TABLET | Refills: 1 | Status: SHIPPED | OUTPATIENT
Start: 2020-06-11 | End: 2020-06-26

## 2020-06-14 ENCOUNTER — TELEPHONE (OUTPATIENT)
Dept: ENDOCRINOLOGY CLINIC | Facility: CLINIC | Age: 40
End: 2020-06-14

## 2020-06-14 NOTE — TELEPHONE ENCOUNTER
Patient has non diabetic hypoglycemia. She has chosen to work on dietary modifications  Please follow up to see how her sugars are doing? Also, I think she should have glucagon at home/ work and teach family or co workers how to use it.   Please explain

## 2020-06-22 RX ORDER — AMITRIPTYLINE HYDROCHLORIDE 100 MG/1
100 TABLET, FILM COATED ORAL NIGHTLY
Qty: 90 TABLET | Refills: 1 | Status: SHIPPED | OUTPATIENT
Start: 2020-06-22 | End: 2020-12-23

## 2020-06-22 NOTE — TELEPHONE ENCOUNTER
Refill request for amitriptyline 100 mg, take 1 tab nightly, #90, 1 refill    LOV: 1/7/20  NOV: None

## 2020-06-26 RX ORDER — METOPROLOL SUCCINATE 25 MG/1
12.5 TABLET, EXTENDED RELEASE ORAL DAILY
Qty: 15 TABLET | Refills: 1 | Status: SHIPPED | OUTPATIENT
Start: 2020-06-26 | End: 2020-07-17

## 2020-07-17 RX ORDER — METOPROLOL SUCCINATE 25 MG/1
TABLET, EXTENDED RELEASE ORAL
Qty: 15 TABLET | Refills: 1 | Status: SHIPPED | OUTPATIENT
Start: 2020-07-17 | End: 2020-08-03

## 2020-08-03 RX ORDER — METOPROLOL SUCCINATE 25 MG/1
12.5 TABLET, EXTENDED RELEASE ORAL DAILY
Qty: 15 TABLET | Refills: 1 | Status: SHIPPED | OUTPATIENT
Start: 2020-08-03 | End: 2021-02-25

## 2020-10-03 ENCOUNTER — APPOINTMENT (OUTPATIENT)
Dept: LAB | Age: 40
End: 2020-10-03
Attending: OBSTETRICS & GYNECOLOGY
Payer: COMMERCIAL

## 2020-10-03 DIAGNOSIS — Z01.818 PRE-OP TESTING: ICD-10-CM

## 2020-10-13 ENCOUNTER — LAB ENCOUNTER (OUTPATIENT)
Dept: LAB | Age: 40
End: 2020-10-13
Attending: INTERNAL MEDICINE
Payer: COMMERCIAL

## 2020-10-13 DIAGNOSIS — Z01.818 PRE-OP TESTING: ICD-10-CM

## 2020-10-16 ENCOUNTER — ANESTHESIA EVENT (OUTPATIENT)
Dept: SURGERY | Facility: HOSPITAL | Age: 40
End: 2020-10-16
Payer: COMMERCIAL

## 2020-10-16 ENCOUNTER — ANESTHESIA (OUTPATIENT)
Dept: SURGERY | Facility: HOSPITAL | Age: 40
End: 2020-10-16
Payer: COMMERCIAL

## 2020-10-16 ENCOUNTER — HOSPITAL ENCOUNTER (OUTPATIENT)
Facility: HOSPITAL | Age: 40
Setting detail: HOSPITAL OUTPATIENT SURGERY
Discharge: HOME | End: 2020-10-16
Attending: OBSTETRICS & GYNECOLOGY | Admitting: OBSTETRICS & GYNECOLOGY
Payer: COMMERCIAL

## 2020-10-16 VITALS
DIASTOLIC BLOOD PRESSURE: 88 MMHG | HEART RATE: 99 BPM | SYSTOLIC BLOOD PRESSURE: 133 MMHG | HEIGHT: 61 IN | BODY MASS INDEX: 35.3 KG/M2 | TEMPERATURE: 99 F | OXYGEN SATURATION: 95 % | RESPIRATION RATE: 15 BRPM | WEIGHT: 187 LBS

## 2020-10-16 DIAGNOSIS — Z01.818 PRE-OP TESTING: Primary | ICD-10-CM

## 2020-10-16 DIAGNOSIS — Z30.2 STERILIZATION: ICD-10-CM

## 2020-10-16 PROCEDURE — 88302 TISSUE EXAM BY PATHOLOGIST: CPT | Performed by: OBSTETRICS & GYNECOLOGY

## 2020-10-16 PROCEDURE — 81025 URINE PREGNANCY TEST: CPT

## 2020-10-16 PROCEDURE — 0UT74ZZ RESECTION OF BILATERAL FALLOPIAN TUBES, PERCUTANEOUS ENDOSCOPIC APPROACH: ICD-10-PCS | Performed by: OBSTETRICS & GYNECOLOGY

## 2020-10-16 RX ORDER — DEXAMETHASONE SODIUM PHOSPHATE 4 MG/ML
VIAL (ML) INJECTION AS NEEDED
Status: DISCONTINUED | OUTPATIENT
Start: 2020-10-16 | End: 2020-10-16 | Stop reason: SURG

## 2020-10-16 RX ORDER — PHENYLEPHRINE HCL 10 MG/ML
VIAL (ML) INJECTION AS NEEDED
Status: DISCONTINUED | OUTPATIENT
Start: 2020-10-16 | End: 2020-10-16 | Stop reason: SURG

## 2020-10-16 RX ORDER — ACETAMINOPHEN 325 MG/1
650 TABLET ORAL EVERY 6 HOURS PRN
Status: DISCONTINUED | OUTPATIENT
Start: 2020-10-16 | End: 2020-10-16

## 2020-10-16 RX ORDER — MORPHINE SULFATE 4 MG/ML
2 INJECTION, SOLUTION INTRAMUSCULAR; INTRAVENOUS EVERY 10 MIN PRN
Status: DISCONTINUED | OUTPATIENT
Start: 2020-10-16 | End: 2020-10-16

## 2020-10-16 RX ORDER — LIDOCAINE HYDROCHLORIDE 10 MG/ML
INJECTION, SOLUTION EPIDURAL; INFILTRATION; INTRACAUDAL; PERINEURAL AS NEEDED
Status: DISCONTINUED | OUTPATIENT
Start: 2020-10-16 | End: 2020-10-16 | Stop reason: SURG

## 2020-10-16 RX ORDER — ONDANSETRON 4 MG/1
4 TABLET, FILM COATED ORAL EVERY 8 HOURS PRN
Status: DISCONTINUED | OUTPATIENT
Start: 2020-10-16 | End: 2020-10-16

## 2020-10-16 RX ORDER — HYDROCODONE BITARTRATE AND ACETAMINOPHEN 5; 325 MG/1; MG/1
TABLET ORAL
Qty: 8 TABLET | Refills: 0 | Status: SHIPPED | OUTPATIENT
Start: 2020-10-16 | End: 2020-12-23

## 2020-10-16 RX ORDER — NALOXONE HYDROCHLORIDE 0.4 MG/ML
80 INJECTION, SOLUTION INTRAMUSCULAR; INTRAVENOUS; SUBCUTANEOUS AS NEEDED
Status: DISCONTINUED | OUTPATIENT
Start: 2020-10-16 | End: 2020-10-16

## 2020-10-16 RX ORDER — MIDAZOLAM HYDROCHLORIDE 1 MG/ML
INJECTION INTRAMUSCULAR; INTRAVENOUS AS NEEDED
Status: DISCONTINUED | OUTPATIENT
Start: 2020-10-16 | End: 2020-10-16 | Stop reason: SURG

## 2020-10-16 RX ORDER — ESMOLOL HYDROCHLORIDE 10 MG/ML
INJECTION INTRAVENOUS AS NEEDED
Status: DISCONTINUED | OUTPATIENT
Start: 2020-10-16 | End: 2020-10-16 | Stop reason: SURG

## 2020-10-16 RX ORDER — METOPROLOL TARTRATE 5 MG/5ML
INJECTION INTRAVENOUS AS NEEDED
Status: DISCONTINUED | OUTPATIENT
Start: 2020-10-16 | End: 2020-10-16 | Stop reason: SURG

## 2020-10-16 RX ORDER — HYDROCODONE BITARTRATE AND ACETAMINOPHEN 5; 325 MG/1; MG/1
2 TABLET ORAL EVERY 6 HOURS PRN
Status: DISCONTINUED | OUTPATIENT
Start: 2020-10-16 | End: 2020-10-16

## 2020-10-16 RX ORDER — PROCHLORPERAZINE EDISYLATE 5 MG/ML
5 INJECTION INTRAMUSCULAR; INTRAVENOUS ONCE AS NEEDED
Status: DISCONTINUED | OUTPATIENT
Start: 2020-10-16 | End: 2020-10-16

## 2020-10-16 RX ORDER — HYDROCODONE BITARTRATE AND ACETAMINOPHEN 5; 325 MG/1; MG/1
1 TABLET ORAL AS NEEDED
Status: DISCONTINUED | OUTPATIENT
Start: 2020-10-16 | End: 2020-10-16

## 2020-10-16 RX ORDER — BUPIVACAINE HYDROCHLORIDE 2.5 MG/ML
INJECTION, SOLUTION EPIDURAL; INFILTRATION; INTRACAUDAL AS NEEDED
Status: DISCONTINUED | OUTPATIENT
Start: 2020-10-16 | End: 2020-10-16 | Stop reason: HOSPADM

## 2020-10-16 RX ORDER — HYDROCODONE BITARTRATE AND ACETAMINOPHEN 5; 325 MG/1; MG/1
2 TABLET ORAL AS NEEDED
Status: DISCONTINUED | OUTPATIENT
Start: 2020-10-16 | End: 2020-10-16

## 2020-10-16 RX ORDER — HYDROMORPHONE HYDROCHLORIDE 1 MG/ML
0.2 INJECTION, SOLUTION INTRAMUSCULAR; INTRAVENOUS; SUBCUTANEOUS EVERY 5 MIN PRN
Status: DISCONTINUED | OUTPATIENT
Start: 2020-10-16 | End: 2020-10-16

## 2020-10-16 RX ORDER — HYDROCODONE BITARTRATE AND ACETAMINOPHEN 5; 325 MG/1; MG/1
1 TABLET ORAL EVERY 6 HOURS PRN
Status: DISCONTINUED | OUTPATIENT
Start: 2020-10-16 | End: 2020-10-16

## 2020-10-16 RX ORDER — MORPHINE SULFATE 10 MG/ML
6 INJECTION, SOLUTION INTRAMUSCULAR; INTRAVENOUS EVERY 10 MIN PRN
Status: DISCONTINUED | OUTPATIENT
Start: 2020-10-16 | End: 2020-10-16

## 2020-10-16 RX ORDER — HYDROMORPHONE HYDROCHLORIDE 1 MG/ML
0.4 INJECTION, SOLUTION INTRAMUSCULAR; INTRAVENOUS; SUBCUTANEOUS EVERY 5 MIN PRN
Status: DISCONTINUED | OUTPATIENT
Start: 2020-10-16 | End: 2020-10-16

## 2020-10-16 RX ORDER — FAMOTIDINE 20 MG/1
20 TABLET ORAL ONCE
Status: COMPLETED | OUTPATIENT
Start: 2020-10-16 | End: 2020-10-16

## 2020-10-16 RX ORDER — MORPHINE SULFATE 4 MG/ML
4 INJECTION, SOLUTION INTRAMUSCULAR; INTRAVENOUS EVERY 10 MIN PRN
Status: DISCONTINUED | OUTPATIENT
Start: 2020-10-16 | End: 2020-10-16

## 2020-10-16 RX ORDER — SODIUM CHLORIDE, SODIUM LACTATE, POTASSIUM CHLORIDE, CALCIUM CHLORIDE 600; 310; 30; 20 MG/100ML; MG/100ML; MG/100ML; MG/100ML
INJECTION, SOLUTION INTRAVENOUS CONTINUOUS
Status: DISCONTINUED | OUTPATIENT
Start: 2020-10-16 | End: 2020-10-16

## 2020-10-16 RX ORDER — HYDROMORPHONE HYDROCHLORIDE 1 MG/ML
0.6 INJECTION, SOLUTION INTRAMUSCULAR; INTRAVENOUS; SUBCUTANEOUS EVERY 5 MIN PRN
Status: DISCONTINUED | OUTPATIENT
Start: 2020-10-16 | End: 2020-10-16

## 2020-10-16 RX ORDER — ONDANSETRON 2 MG/ML
4 INJECTION INTRAMUSCULAR; INTRAVENOUS EVERY 8 HOURS PRN
Status: DISCONTINUED | OUTPATIENT
Start: 2020-10-16 | End: 2020-10-16

## 2020-10-16 RX ORDER — LIDOCAINE HYDROCHLORIDE 40 MG/ML
SOLUTION TOPICAL AS NEEDED
Status: DISCONTINUED | OUTPATIENT
Start: 2020-10-16 | End: 2020-10-16 | Stop reason: SURG

## 2020-10-16 RX ORDER — METOPROLOL TARTRATE 5 MG/5ML
2.5 INJECTION INTRAVENOUS ONCE
Status: DISCONTINUED | OUTPATIENT
Start: 2020-10-16 | End: 2020-10-16

## 2020-10-16 RX ORDER — ONDANSETRON 2 MG/ML
4 INJECTION INTRAMUSCULAR; INTRAVENOUS ONCE AS NEEDED
Status: DISCONTINUED | OUTPATIENT
Start: 2020-10-16 | End: 2020-10-16

## 2020-10-16 RX ORDER — HALOPERIDOL 5 MG/ML
0.25 INJECTION INTRAMUSCULAR ONCE AS NEEDED
Status: DISCONTINUED | OUTPATIENT
Start: 2020-10-16 | End: 2020-10-16

## 2020-10-16 RX ORDER — ONDANSETRON 2 MG/ML
INJECTION INTRAMUSCULAR; INTRAVENOUS AS NEEDED
Status: DISCONTINUED | OUTPATIENT
Start: 2020-10-16 | End: 2020-10-16 | Stop reason: SURG

## 2020-10-16 RX ORDER — ACETAMINOPHEN 500 MG
1000 TABLET ORAL ONCE
Status: COMPLETED | OUTPATIENT
Start: 2020-10-16 | End: 2020-10-16

## 2020-10-16 RX ORDER — ROCURONIUM BROMIDE 10 MG/ML
INJECTION, SOLUTION INTRAVENOUS AS NEEDED
Status: DISCONTINUED | OUTPATIENT
Start: 2020-10-16 | End: 2020-10-16 | Stop reason: SURG

## 2020-10-16 RX ORDER — MORPHINE SULFATE 4 MG/ML
2 INJECTION, SOLUTION INTRAMUSCULAR; INTRAVENOUS EVERY 2 HOUR PRN
Status: DISCONTINUED | OUTPATIENT
Start: 2020-10-16 | End: 2020-10-16

## 2020-10-16 RX ORDER — SODIUM CHLORIDE 9 MG/ML
INJECTION, SOLUTION INTRAVENOUS CONTINUOUS
Status: DISCONTINUED | OUTPATIENT
Start: 2020-10-16 | End: 2020-10-16

## 2020-10-16 RX ORDER — MORPHINE SULFATE 4 MG/ML
1 INJECTION, SOLUTION INTRAMUSCULAR; INTRAVENOUS EVERY 2 HOUR PRN
Status: DISCONTINUED | OUTPATIENT
Start: 2020-10-16 | End: 2020-10-16

## 2020-10-16 RX ORDER — MORPHINE SULFATE 4 MG/ML
4 INJECTION, SOLUTION INTRAMUSCULAR; INTRAVENOUS EVERY 2 HOUR PRN
Status: DISCONTINUED | OUTPATIENT
Start: 2020-10-16 | End: 2020-10-16

## 2020-10-16 RX ADMIN — PHENYLEPHRINE HCL 100 MCG: 10 MG/ML VIAL (ML) INJECTION at 08:07:00

## 2020-10-16 RX ADMIN — LIDOCAINE HYDROCHLORIDE 50 MG: 10 INJECTION, SOLUTION EPIDURAL; INFILTRATION; INTRACAUDAL; PERINEURAL at 07:42:00

## 2020-10-16 RX ADMIN — ONDANSETRON 4 MG: 2 INJECTION INTRAMUSCULAR; INTRAVENOUS at 08:07:00

## 2020-10-16 RX ADMIN — ROCURONIUM BROMIDE 10 MG: 10 INJECTION, SOLUTION INTRAVENOUS at 07:42:00

## 2020-10-16 RX ADMIN — METOPROLOL TARTRATE 1 MG: 5 INJECTION INTRAVENOUS at 08:51:00

## 2020-10-16 RX ADMIN — PHENYLEPHRINE HCL 50 MCG: 10 MG/ML VIAL (ML) INJECTION at 08:00:00

## 2020-10-16 RX ADMIN — METOPROLOL TARTRATE 1 MG: 5 INJECTION INTRAVENOUS at 08:35:00

## 2020-10-16 RX ADMIN — LIDOCAINE HYDROCHLORIDE 4 ML: 40 SOLUTION TOPICAL at 07:42:00

## 2020-10-16 RX ADMIN — ESMOLOL HYDROCHLORIDE 20 MG: 10 INJECTION INTRAVENOUS at 07:38:00

## 2020-10-16 RX ADMIN — ESMOLOL HYDROCHLORIDE 20 MG: 10 INJECTION INTRAVENOUS at 07:41:00

## 2020-10-16 RX ADMIN — ESMOLOL HYDROCHLORIDE 20 MG: 10 INJECTION INTRAVENOUS at 07:44:00

## 2020-10-16 RX ADMIN — ESMOLOL HYDROCHLORIDE 20 MG: 10 INJECTION INTRAVENOUS at 07:47:00

## 2020-10-16 RX ADMIN — SODIUM CHLORIDE, SODIUM LACTATE, POTASSIUM CHLORIDE, CALCIUM CHLORIDE: 600; 310; 30; 20 INJECTION, SOLUTION INTRAVENOUS at 09:04:00

## 2020-10-16 RX ADMIN — DEXAMETHASONE SODIUM PHOSPHATE 4 MG: 4 MG/ML VIAL (ML) INJECTION at 08:07:00

## 2020-10-16 RX ADMIN — METOPROLOL TARTRATE 1 MG: 5 INJECTION INTRAVENOUS at 08:11:00

## 2020-10-16 RX ADMIN — ROCURONIUM BROMIDE 20 MG: 10 INJECTION, SOLUTION INTRAVENOUS at 07:55:00

## 2020-10-16 RX ADMIN — MIDAZOLAM HYDROCHLORIDE 2 MG: 1 INJECTION INTRAMUSCULAR; INTRAVENOUS at 07:42:00

## 2020-10-16 NOTE — ANESTHESIA POSTPROCEDURE EVALUATION
Patient: Patrica Barragan    Procedure Summary     Date: 10/16/20 Room / Location: [address] [place] [address] MAIN OR 03 / [address] [facility] MAIN OR    Anesthesia Start: 3550 Anesthesia Stop: 3802    Procedure: LAPAROSCOPIC SALPINGECTOMY (Bilateral ) Diagnosis: (sterilization)    Surgeons: Herman Coats

## 2020-10-16 NOTE — ANESTHESIA PREPROCEDURE EVALUATION
Anesthesia PreOp Note    HPI:     Ivon Camarillo is a 38year old female who presents for preoperative consultation requested by: Tangela Gilman    Date of Surgery: 10/16/2020    Procedure(s):  LAPAROSCOPIC SALPINGECTOMY  Indication: sterilization KNEE ARTHROSCOPY Left 5/15/2019    Performed by Deborah Bautista MD at [facility] OR   • KNEE DEBRIDEMENT Left 9/16/2019    Performed by Deborah Bautista MD at [address] [place] [address]         •  Metoprolol Succinate ER 25 MG Oral Tablet 24 Hr, Take early 42's.     • Hypertension Father      Social History    Socioeconomic History      Marital status:       Spouse name: Not on file      Number of children: Not on file      Years of education: Not on file      Highest education level: Not on file Narrative      The patient does not use an assistive device. .        The patient does not live in a home with stairs. Available pre-op labs reviewed. Vital Signs: Body mass index is 34.01 kg/m².    height is 1.549 m (5' 1\") and weight is

## 2020-10-16 NOTE — INTERVAL H&P NOTE
Pre-op Diagnosis: sterilization    The above referenced H&P was reviewed by Nilsa Franco on 10/16/2020, the patient was examined and no significant changes have occurred in the patient's condition since the H&P was performed.   I discussed with edith

## 2020-10-16 NOTE — BRIEF OP NOTE
Pre-Operative Diagnosis: elective sterilization     Post-Operative Diagnosis: same     Procedure Performed:   Procedure(s):  laparoscopic bilateral salpingectomy    Surgeon(s) and Role:     * Dayanara Awad MD -

## 2020-10-16 NOTE — ANESTHESIA PROCEDURE NOTES
Airway  Urgency: Elective      General Information and Staff    Patient location during procedure: OR  Anesthesiologist: Eligio Light DO  Resident/CRNA: Marissa Kent CRNA  Performed: CRNA     Indications and Patient Condition  Indications for airway m

## 2020-12-17 RX ORDER — AMITRIPTYLINE HYDROCHLORIDE 100 MG/1
TABLET ORAL
Qty: 90 TABLET | Refills: 1 | OUTPATIENT
Start: 2020-12-17

## 2020-12-22 ENCOUNTER — TELEPHONE (OUTPATIENT)
Dept: INTERNAL MEDICINE CLINIC | Facility: CLINIC | Age: 40
End: 2020-12-22

## 2020-12-22 NOTE — TELEPHONE ENCOUNTER
Received medical records request from [facility]. requesting records from November 30,2018 to present. Sending out to stat scan 12/22/2020.  Received on 12/16

## 2020-12-23 ENCOUNTER — OFFICE VISIT (OUTPATIENT)
Dept: NEUROLOGY | Facility: CLINIC | Age: 40
End: 2020-12-23
Payer: COMMERCIAL

## 2020-12-23 VITALS
SYSTOLIC BLOOD PRESSURE: 122 MMHG | WEIGHT: 180 LBS | BODY MASS INDEX: 33.99 KG/M2 | DIASTOLIC BLOOD PRESSURE: 80 MMHG | HEIGHT: 61 IN

## 2020-12-23 DIAGNOSIS — M79.602 LEFT ARM PAIN: ICD-10-CM

## 2020-12-23 DIAGNOSIS — M54.50 CHRONIC BILATERAL LOW BACK PAIN WITHOUT SCIATICA: Primary | ICD-10-CM

## 2020-12-23 DIAGNOSIS — G89.29 CHRONIC BILATERAL LOW BACK PAIN WITHOUT SCIATICA: Primary | ICD-10-CM

## 2020-12-23 DIAGNOSIS — G56.02 CARPAL TUNNEL SYNDROME OF LEFT WRIST: ICD-10-CM

## 2020-12-23 PROCEDURE — 99215 OFFICE O/P EST HI 40 MIN: CPT | Performed by: OTHER

## 2020-12-23 PROCEDURE — 3079F DIAST BP 80-89 MM HG: CPT | Performed by: OTHER

## 2020-12-23 PROCEDURE — 3008F BODY MASS INDEX DOCD: CPT | Performed by: OTHER

## 2020-12-23 PROCEDURE — 3074F SYST BP LT 130 MM HG: CPT | Performed by: OTHER

## 2020-12-23 RX ORDER — AMITRIPTYLINE HYDROCHLORIDE 100 MG/1
100 TABLET, FILM COATED ORAL NIGHTLY
Qty: 90 TABLET | Refills: 1 | Status: SHIPPED | OUTPATIENT
Start: 2020-12-23 | End: 2021-02-02 | Stop reason: DRUGHIGH

## 2020-12-23 RX ORDER — PREGABALIN 100 MG/1
100 CAPSULE ORAL 2 TIMES DAILY
Qty: 180 CAPSULE | Refills: 0 | Status: SHIPPED | OUTPATIENT
Start: 2020-12-23 | End: 2021-02-16

## 2020-12-23 NOTE — PATIENT INSTRUCTIONS
Plan:    1. I have refilled your  Nortriptyline. 2. For your carpal tunnel please buy a brace over the counter. There should be a firm support inside of the brace. I will see if we can have physical therapy make you a brace here.   3. For the pain radiati

## 2020-12-23 NOTE — PROGRESS NOTES
Paula Dub 36  Neurology clinic follow-up note  Reason for visit: Follow-up for refill for nortriptyline, chronic low back pain, left arm numbness, and headache  PCP: Charli Colunga MD    Chief Complaint: Neurologic Problem (LOV 1/ left hand. She denies any pain in her neck that radiated down her shoulder and into her arm or hand. She reports that she is dropping objects.         Review and summation of prior records  1. 12/4/2018 neurology consult note from [facility]: She presented with negative.       Past Medical History:   Diagnosis Date   • Anesthesia complication     increase heart rate after anesthesia   • Asthma    • Back problem    • Disorder of thyroid     thyroid nodules   • Esophageal reflux    • Essential hypertension    • High Great-Grandparent         mat great aunt breast ca early 42's.     • Hypertension Father        Social history:    Smoking status: Never Smoker   Smokeless tobacco: Never Used       Alcohol use No   Comment: rare       Drug use: No       Family History   Pr Motor:  normal tone/bulk. No interosseous wasting. No flattening of hypothenar eminences. Motor Strength    Pronator drift: No pronator drift. No subtle signs of hemiparesis. Her fingers do not curl in with pronator drift testing.    Arm Rolling: No orb disc bulges. MRI of the brain is unremarkable for any signs of demyelinating disease. MRI of the C-spine is unremarkable.               David David is a very pleasant 38year old right-handed woman w/ a pmhx of thyroid nodules, hypertension, L3 neurofo above.  Evaluation/Outcome: Verbalized understanding    This document is not intended to support charting by exception. Sections left blank in a completed note should be presumed not to have been done. Education and counseling provided to patient.

## 2021-01-02 NOTE — PATIENT INSTRUCTIONS
Post Injection Instructions     1. Please do not do anything strenuous over the next two days (if you had a knee injection do not walk more than 2 city blocks, do not attend any aerobic classes, do not run, no heavy lifting, no prolong standing).   2. You m PAST MEDICAL HISTORY:  Breast CA, right 1989 s/p mastectomy ,IV Chemotherapy    CHF (congestive heart failure)     DVT of leg (deep venous thrombosis) right calf DVT  2/2019 pt on Eliquis    HTN (hypertension)     Hyperlipidemia     Lymphedema of arm right arm s/p mastectomy    Obese     Rectal cancer s/p chemotherapy and  radiation 12 weeks 2/2015 -3/2015    Renal insufficiency     Type II diabetes mellitus

## 2021-02-01 ENCOUNTER — PATIENT MESSAGE (OUTPATIENT)
Dept: NEUROLOGY | Facility: CLINIC | Age: 41
End: 2021-02-01

## 2021-02-01 DIAGNOSIS — G43.009 MIGRAINE WITHOUT AURA AND WITHOUT STATUS MIGRAINOSUS, NOT INTRACTABLE: Primary | ICD-10-CM

## 2021-02-01 DIAGNOSIS — G47.00 INSOMNIA, UNSPECIFIED TYPE: ICD-10-CM

## 2021-02-01 NOTE — TELEPHONE ENCOUNTER
From: David David  To: Fermin Carrington DO  Sent: 2/1/2021 2:12 PM CST  Subject: Non-Urgent Medical Question    Mr. Aubrie Banuelos    Can you please up the dosage on the Amitriptyline for the last few weeks I haven't been able to sleep thru the night without wakin

## 2021-02-02 RX ORDER — AMITRIPTYLINE HYDROCHLORIDE 150 MG/1
150 TABLET, FILM COATED ORAL NIGHTLY
Qty: 30 TABLET | Refills: 0 | Status: SHIPPED | OUTPATIENT
Start: 2021-02-02 | End: 2021-07-20

## 2021-02-02 NOTE — TELEPHONE ENCOUNTER
Called patient back. Explained that amitriptyline is typically not used as a sleep aid/sedative-hypnotic and this is a side effect of the medication. She is on the medication for headaches. The maximum dose is 150 mg/day for headaches.   I recommended sh

## 2021-02-16 ENCOUNTER — OFFICE VISIT (OUTPATIENT)
Dept: INTERNAL MEDICINE CLINIC | Facility: CLINIC | Age: 41
End: 2021-02-16
Payer: COMMERCIAL

## 2021-02-16 VITALS
BODY MASS INDEX: 37 KG/M2 | HEART RATE: 101 BPM | DIASTOLIC BLOOD PRESSURE: 90 MMHG | SYSTOLIC BLOOD PRESSURE: 140 MMHG | WEIGHT: 198 LBS | OXYGEN SATURATION: 97 %

## 2021-02-16 DIAGNOSIS — E66.09 CLASS 1 OBESITY DUE TO EXCESS CALORIES WITH SERIOUS COMORBIDITY AND BODY MASS INDEX (BMI) OF 34.0 TO 34.9 IN ADULT: ICD-10-CM

## 2021-02-16 DIAGNOSIS — Z12.31 VISIT FOR SCREENING MAMMOGRAM: ICD-10-CM

## 2021-02-16 DIAGNOSIS — Z00.00 GENERAL MEDICAL EXAM: ICD-10-CM

## 2021-02-16 DIAGNOSIS — M54.16 LUMBAR RADICULOPATHY: ICD-10-CM

## 2021-02-16 DIAGNOSIS — Z28.20 VACCINE REFUSED BY PATIENT: ICD-10-CM

## 2021-02-16 DIAGNOSIS — G47.9 SLEEP DISORDER: ICD-10-CM

## 2021-02-16 DIAGNOSIS — J45.20 MILD INTERMITTENT ASTHMA WITHOUT COMPLICATION: ICD-10-CM

## 2021-02-16 DIAGNOSIS — R13.12 OROPHARYNGEAL DYSPHAGIA: Primary | ICD-10-CM

## 2021-02-16 DIAGNOSIS — I10 ESSENTIAL HYPERTENSION: ICD-10-CM

## 2021-02-16 DIAGNOSIS — M22.2X2 PATELLOFEMORAL PAIN SYNDROME OF LEFT KNEE: ICD-10-CM

## 2021-02-16 DIAGNOSIS — R00.0 TACHYCARDIA: ICD-10-CM

## 2021-02-16 DIAGNOSIS — E04.1 LEFT THYROID NODULE: ICD-10-CM

## 2021-02-16 DIAGNOSIS — R73.03 PREDIABETES: ICD-10-CM

## 2021-02-16 PROCEDURE — 3077F SYST BP >= 140 MM HG: CPT | Performed by: INTERNAL MEDICINE

## 2021-02-16 PROCEDURE — 3080F DIAST BP >= 90 MM HG: CPT | Performed by: INTERNAL MEDICINE

## 2021-02-16 PROCEDURE — 99215 OFFICE O/P EST HI 40 MIN: CPT | Performed by: INTERNAL MEDICINE

## 2021-02-16 RX ORDER — METHOCARBAMOL 500 MG/1
TABLET, FILM COATED ORAL
Qty: 60 TABLET | Refills: 0 | Status: SHIPPED | OUTPATIENT
Start: 2021-02-16 | End: 2021-03-22

## 2021-02-16 NOTE — PROGRESS NOTES
HPI:    Patient ID: Maury Ascencio is a 38year old female. HPI  Patient is her to establish care.      38year old woman w/  pm hx of thyroid nodules, hypertension,prediabetes  L3 neuroforaminal stenosis, chronic low back pain,osteoarthritis,  migraine, Diagnosis Date   • Anesthesia complication     increase heart rate after anesthesia   • Asthma    • Back problem    • Disorder of thyroid     thyroid nodules   • Esophageal reflux    • Essential hypertension    • High blood pressure    • Migraines    • O wheezing    Cardiovascular: Positive for palpitations. Gastrointestinal: Negative for abdominal pain. Endocrine: Positive for cold intolerance. Negative for polydipsia and polyuria. Genitourinary: Negative for dysuria.    Musculoskeletal: Positive for supple. No JVD present. Cardiovascular: Normal rate. Sinus tachycardia   Pulmonary/Chest: Effort normal and breath sounds normal.   Abdominal: Soft. Bowel sounds are normal. Musculoskeletal:      Comments: Tender paralumbar area.  Straight leg negative WITH DIFFERENTIAL WITH PLATELET; Future  - LIPID PANEL; Future    9. Mild intermittent asthma without complication   no flare with this weather  Albuterol 2 puffs every 4-6 hours as needed      10. Tachycardia  Check TSH.   Increase metoprolol ER to 25 mg d

## 2021-02-16 NOTE — PATIENT INSTRUCTIONS
Diet: Diabetes  Food is an important tool that you can use to control diabetes and stay healthy. Eating well-balanced meals in the correct amounts will help you control your blood glucose levels and prevent low blood sugar reactions.  It will also help yo water or calorie-free diet drinks instead. · Eat less fat to help lower your risk of heart disease. Use nonfat or low-fat dairy products and lean meats. Avoid fried foods. Use cooking oils that are unsaturated, such as olive, canola, or peanut oil.   · [place]

## 2021-02-21 ENCOUNTER — HOSPITAL ENCOUNTER (OUTPATIENT)
Dept: MAMMOGRAPHY | Facility: HOSPITAL | Age: 41
Discharge: HOME | End: 2021-02-21
Attending: INTERNAL MEDICINE
Payer: COMMERCIAL

## 2021-02-21 DIAGNOSIS — Z12.31 VISIT FOR SCREENING MAMMOGRAM: ICD-10-CM

## 2021-02-21 PROCEDURE — 77063 BREAST TOMOSYNTHESIS BI: CPT | Performed by: INTERNAL MEDICINE

## 2021-02-21 PROCEDURE — 77067 SCR MAMMO BI INCL CAD: CPT | Performed by: INTERNAL MEDICINE

## 2021-02-23 ENCOUNTER — TELEPHONE (OUTPATIENT)
Dept: FAMILY MEDICINE CLINIC | Facility: CLINIC | Age: 41
End: 2021-02-23

## 2021-02-23 ENCOUNTER — PATIENT MESSAGE (OUTPATIENT)
Dept: INTERNAL MEDICINE CLINIC | Facility: CLINIC | Age: 41
End: 2021-02-23

## 2021-02-25 RX ORDER — METOPROLOL SUCCINATE 25 MG/1
25 TABLET, EXTENDED RELEASE ORAL DAILY
Qty: 90 TABLET | Refills: 0 | Status: SHIPPED | OUTPATIENT
Start: 2021-02-25 | End: 2021-06-01

## 2021-02-25 NOTE — TELEPHONE ENCOUNTER
5. Essential hypertension  Increase metoprolol ER to 25 mgs every day  Low salt diet   Instructions       Return in about 3 months (around 5/16/2021), or if symptoms worsen or fail to improve.    ----    Per LOV of 2/16/21 (as above).     Called patient and

## 2021-02-25 NOTE — TELEPHONE ENCOUNTER
From: Edilberto Gilbert  To: Marta Quiles MD  Sent: 2/23/2021 2:03 PM CST  Subject: Prescription Question    Dr. Emilia Patton,    Can you please refill my prescription for the Metoprolol.     Thanks

## 2021-03-03 ENCOUNTER — HOSPITAL ENCOUNTER (OUTPATIENT)
Dept: MAMMOGRAPHY | Facility: HOSPITAL | Age: 41
Discharge: HOME | End: 2021-03-03
Attending: INTERNAL MEDICINE
Payer: COMMERCIAL

## 2021-03-03 ENCOUNTER — HOSPITAL ENCOUNTER (OUTPATIENT)
Dept: ULTRASOUND IMAGING | Facility: HOSPITAL | Age: 41
Discharge: HOME | End: 2021-03-03
Attending: INTERNAL MEDICINE
Payer: COMMERCIAL

## 2021-03-03 DIAGNOSIS — R92.8 ABNORMAL MAMMOGRAM: ICD-10-CM

## 2021-03-03 PROCEDURE — 76642 ULTRASOUND BREAST LIMITED: CPT | Performed by: INTERNAL MEDICINE

## 2021-03-03 PROCEDURE — 77061 BREAST TOMOSYNTHESIS UNI: CPT | Performed by: INTERNAL MEDICINE

## 2021-03-03 PROCEDURE — 77065 DX MAMMO INCL CAD UNI: CPT | Performed by: INTERNAL MEDICINE

## 2021-03-10 DIAGNOSIS — G47.00 INSOMNIA, UNSPECIFIED TYPE: ICD-10-CM

## 2021-03-10 DIAGNOSIS — G43.009 MIGRAINE WITHOUT AURA AND WITHOUT STATUS MIGRAINOSUS, NOT INTRACTABLE: ICD-10-CM

## 2021-03-10 NOTE — TELEPHONE ENCOUNTER
Refill request for amitriptyline 150 mg, take 1 tab nightly, #30, no refills    LOV: 12/23/20  NOV: None  Last refilled on 2/2/21

## 2021-03-18 ENCOUNTER — PATIENT MESSAGE (OUTPATIENT)
Dept: NEUROLOGY | Facility: CLINIC | Age: 41
End: 2021-03-18

## 2021-03-18 DIAGNOSIS — Z23 NEED FOR VACCINATION: ICD-10-CM

## 2021-03-19 NOTE — TELEPHONE ENCOUNTER
From: Marilee Stewart  To:  Johnny Singh MD  Sent: 3/18/2021 4:58 PM CDT  Subject: Visit Follow-up Question    Dr. Foy Spine    Can I schedule a video visit with you, I'm having a lot of lower back pain/discomfort    Thanks

## 2021-03-22 RX ORDER — METHOCARBAMOL 500 MG/1
TABLET, FILM COATED ORAL
Qty: 60 TABLET | Refills: 0 | Status: SHIPPED | OUTPATIENT
Start: 2021-03-22 | End: 2021-09-01

## 2021-03-24 ENCOUNTER — HOSPITAL ENCOUNTER (OUTPATIENT)
Dept: ULTRASOUND IMAGING | Facility: HOSPITAL | Age: 41
Discharge: HOME | End: 2021-03-24
Attending: INTERNAL MEDICINE
Payer: COMMERCIAL

## 2021-03-24 DIAGNOSIS — E04.1 LEFT THYROID NODULE: ICD-10-CM

## 2021-03-24 PROCEDURE — 76536 US EXAM OF HEAD AND NECK: CPT | Performed by: INTERNAL MEDICINE

## 2021-05-15 ENCOUNTER — LAB ENCOUNTER (OUTPATIENT)
Dept: LAB | Facility: REFERENCE LAB | Age: 41
End: 2021-05-15
Attending: INTERNAL MEDICINE
Payer: COMMERCIAL

## 2021-05-15 DIAGNOSIS — Z00.00 GENERAL MEDICAL EXAM: ICD-10-CM

## 2021-05-15 DIAGNOSIS — R73.03 PREDIABETES: ICD-10-CM

## 2021-05-15 DIAGNOSIS — E04.1 LEFT THYROID NODULE: ICD-10-CM

## 2021-05-15 PROCEDURE — 36415 COLL VENOUS BLD VENIPUNCTURE: CPT

## 2021-05-15 PROCEDURE — 80061 LIPID PANEL: CPT

## 2021-05-15 PROCEDURE — 85025 COMPLETE CBC W/AUTO DIFF WBC: CPT

## 2021-05-15 PROCEDURE — 80053 COMPREHEN METABOLIC PANEL: CPT

## 2021-05-15 PROCEDURE — 84443 ASSAY THYROID STIM HORMONE: CPT

## 2021-05-15 PROCEDURE — 82570 ASSAY OF URINE CREATININE: CPT

## 2021-05-15 PROCEDURE — 82043 UR ALBUMIN QUANTITATIVE: CPT

## 2021-05-15 PROCEDURE — 83036 HEMOGLOBIN GLYCOSYLATED A1C: CPT

## 2021-05-17 ENCOUNTER — OFFICE VISIT (OUTPATIENT)
Dept: INTERNAL MEDICINE CLINIC | Facility: CLINIC | Age: 41
End: 2021-05-17
Payer: COMMERCIAL

## 2021-05-17 VITALS
OXYGEN SATURATION: 98 % | HEART RATE: 88 BPM | SYSTOLIC BLOOD PRESSURE: 138 MMHG | DIASTOLIC BLOOD PRESSURE: 75 MMHG | WEIGHT: 199 LBS | HEIGHT: 61 IN | BODY MASS INDEX: 37.57 KG/M2

## 2021-05-17 DIAGNOSIS — R73.01 IFG (IMPAIRED FASTING GLUCOSE): ICD-10-CM

## 2021-05-17 DIAGNOSIS — J45.20 MILD INTERMITTENT ASTHMA WITHOUT COMPLICATION: ICD-10-CM

## 2021-05-17 DIAGNOSIS — E04.1 LEFT THYROID NODULE: ICD-10-CM

## 2021-05-17 DIAGNOSIS — R74.8 ELEVATED ALKALINE PHOSPHATASE LEVEL: Primary | ICD-10-CM

## 2021-05-17 DIAGNOSIS — E66.01 CLASS 2 SEVERE OBESITY DUE TO EXCESS CALORIES WITH SERIOUS COMORBIDITY AND BODY MASS INDEX (BMI) OF 37.0 TO 37.9 IN ADULT (HCC): ICD-10-CM

## 2021-05-17 PROCEDURE — 99214 OFFICE O/P EST MOD 30 MIN: CPT | Performed by: INTERNAL MEDICINE

## 2021-05-17 PROCEDURE — 3075F SYST BP GE 130 - 139MM HG: CPT | Performed by: INTERNAL MEDICINE

## 2021-05-17 PROCEDURE — 3008F BODY MASS INDEX DOCD: CPT | Performed by: INTERNAL MEDICINE

## 2021-05-17 PROCEDURE — 3078F DIAST BP <80 MM HG: CPT | Performed by: INTERNAL MEDICINE

## 2021-05-17 NOTE — PROGRESS NOTES
HPI:    Patient ID: Reyes Hidalgo is a 42year old female. HPI     Patient is here for follow-up.     38year Joshua Panda is an  w/  pm hx of thyroid nodules, hypertension,prediabetes  L3 neuroforaminal stenosis, chronic low back pain,osteoarthrit 41.1   RDW      11.0 - 15.0 %  15.0   Platelet Count      972.4 - 450.0 10(3)uL  471.0 (H)   Prelim Neutrophil Abs      1.50 - 7.70 x10 (3) uL  5.02   Neutrophils Absolute      1.50 - 7.70 x10(3) uL  5.02   Lymphocytes Absolute      1.00 - 4.00 x10(3) uL AVERAGE GLUCOSE      68 - 126 mg/dL  117   TSH      0.358 - 3.740 mIU/mL  0.415       Current Outpatient Medications   Medication Sig Dispense Refill   • methocarbamol 500 MG Oral Tab TAKE 1 TABLET BY MOUTH TWICE A DAY 60 tablet 0   • Metoprolol Succinate Constitutional: Negative for appetite change and fatigue. Weight gain    HENT: Negative for congestion and trouble swallowing. Eyes: Negative for visual disturbance.    Respiratory: Negative for cough, chest tightness, shortness of breath and wh intact. Conjunctiva/sclera: Conjunctivae normal.      Pupils: Pupils are equal, round, and reactive to light. Neck:      Comments: Thyroid nodule  Cardiovascular:      Rate and Rhythm: Normal rate and regular rhythm. Pulses: Normal pulses. fiber, lean protein diet  -aerobic exercises 30 mins 3 x weekly      5. Mild intermittent asthma without complication  No flare of asthma.   Review asthma action plan    6. history of tachycardia  Metoprolol ER 25 mg daily        Imaging & Referrals:  [facility]

## 2021-05-18 NOTE — PATIENT INSTRUCTIONS
Eating Heart-Healthy Foods  Eating has a big impact on your heart health. In fact, eating healthier can improve several of your heart risks at once. For instance, it helps you manage weight, cholesterol, and blood pressure.  Here are ideas to help you citlaly foods and getting regular exercise. To help you track your progress, keep a diary to record what you eat and how often you exercise. Choose the right foods  Aim to make these foods staples of your diet.  If you have diabetes, you may have different recomme down on added fat, sugar and salt. Look on the internet for lower-fat, lower-sodium recipes without a lot of added sugars. Also try these tips:   · Remove fat from meat and skin from poultry before cooking. · Skim fat from the surface of soups and sauces.

## 2021-05-21 ENCOUNTER — TELEMEDICINE (OUTPATIENT)
Dept: ENDOCRINOLOGY CLINIC | Facility: CLINIC | Age: 41
End: 2021-05-21
Payer: COMMERCIAL

## 2021-05-21 DIAGNOSIS — E04.1 THYROID NODULE: Primary | ICD-10-CM

## 2021-05-21 PROCEDURE — 99213 OFFICE O/P EST LOW 20 MIN: CPT | Performed by: INTERNAL MEDICINE

## 2021-05-21 NOTE — PROGRESS NOTES
Telehealth outside of [address] Verbal Consent   I conducted a telehealth visit with Reyes Hidalgo today, 05/21/21, which was completed using two-way, real-time interactive audio and video communication.  This has been done in good grace to demario head and neck radiation before age 20: denies  Compressive symptoms (difficulty in breathing or swallowing): no breathing problems.  However, she does report a sensation of food getting stuck in her throat at times and she needs to drink water to push it fo normal pupils  Throat/Neck: normal sound to voice. Normal hearing, normal speech  Respiratory:  Speaking in full sentences, non-labored.  no increased work of breathing, no audible wheezing    Skin:  normal moisture and skin texture, no visible lesions  Hem of restrictions of visitation. There are limitations of this visit as no physical exam could be performed. Every conscious effort was taken to allow for sufficient and adequate time.   This billing was spent on reviewing labs, medications, radiology tests

## 2021-06-01 RX ORDER — METOPROLOL SUCCINATE 25 MG/1
TABLET, EXTENDED RELEASE ORAL
Qty: 90 TABLET | Refills: 0 | Status: SHIPPED | OUTPATIENT
Start: 2021-06-01

## 2021-06-14 DIAGNOSIS — M54.50 CHRONIC BILATERAL LOW BACK PAIN WITHOUT SCIATICA: ICD-10-CM

## 2021-06-14 DIAGNOSIS — G89.29 CHRONIC BILATERAL LOW BACK PAIN WITHOUT SCIATICA: ICD-10-CM

## 2021-06-14 NOTE — TELEPHONE ENCOUNTER
Refill request for amitriptyline 100 mg, take 1 tab daily, #90, no refills    LOV: 12/2320  NOV: None  Last refilled on 12/23/20 for 90 day with 1 refill

## 2021-07-17 RX ORDER — AMITRIPTYLINE HYDROCHLORIDE 150 MG/1
TABLET ORAL
Qty: 30 TABLET | Refills: 0 | OUTPATIENT
Start: 2021-07-17

## 2021-07-20 ENCOUNTER — PATIENT MESSAGE (OUTPATIENT)
Dept: NEUROLOGY | Facility: CLINIC | Age: 41
End: 2021-07-20

## 2021-07-20 DIAGNOSIS — G47.00 INSOMNIA, UNSPECIFIED TYPE: ICD-10-CM

## 2021-07-20 DIAGNOSIS — G43.009 MIGRAINE WITHOUT AURA AND WITHOUT STATUS MIGRAINOSUS, NOT INTRACTABLE: ICD-10-CM

## 2021-07-20 RX ORDER — AMITRIPTYLINE HYDROCHLORIDE 150 MG/1
150 TABLET, FILM COATED ORAL NIGHTLY
Qty: 30 TABLET | Refills: 0 | Status: SHIPPED | OUTPATIENT
Start: 2021-07-20 | End: 2021-08-14

## 2021-07-20 NOTE — TELEPHONE ENCOUNTER
Pt has not had 3 month follow-up. My chart message sent to make appt.      Medication: Amitriptyline HCl 150 MG Oral Tab    [place] 7/13/3783    Last office visit: 12/23/20  Due back to clinic per last office note:  3 mon

## 2021-07-20 NOTE — TELEPHONE ENCOUNTER
It is ordered. Please let her know this is a high dose, she needs to read the package insert about the potential adverse effects and she will need to see sleep medicine in follow up.     Acosta White,   Staff V[facility] General Neurology

## 2021-07-20 NOTE — TELEPHONE ENCOUNTER
From: Aruna Longo  To: Lynda Lunsford DO  Sent: 7/20/2021 4:06 PM CDT  Subject: Prescription Question    Dr. Fidencio Harris    Can you please refill my prescription for the Amitriptyline?  thanks

## 2021-08-13 DIAGNOSIS — G43.009 MIGRAINE WITHOUT AURA AND WITHOUT STATUS MIGRAINOSUS, NOT INTRACTABLE: ICD-10-CM

## 2021-08-13 DIAGNOSIS — G47.00 INSOMNIA, UNSPECIFIED TYPE: ICD-10-CM

## 2021-08-13 NOTE — TELEPHONE ENCOUNTER
Refill request for amitriptyline 150 mg, take 1 tab nightly, #30, no refills    LOV: 12/23/20  NOV: 8/25/21  Last refilled on 7/20/21

## 2021-08-14 RX ORDER — AMITRIPTYLINE HYDROCHLORIDE 150 MG/1
TABLET, FILM COATED ORAL
Qty: 30 TABLET | Refills: 0 | Status: SHIPPED | OUTPATIENT
Start: 2021-08-14 | End: 2021-08-25

## 2021-08-25 ENCOUNTER — TELEMEDICINE (OUTPATIENT)
Dept: NEUROLOGY | Facility: CLINIC | Age: 41
End: 2021-08-25
Payer: COMMERCIAL

## 2021-08-25 DIAGNOSIS — G43.009 MIGRAINE WITHOUT AURA AND WITHOUT STATUS MIGRAINOSUS, NOT INTRACTABLE: ICD-10-CM

## 2021-08-25 DIAGNOSIS — M54.16 LUMBAR RADICULOPATHY: Primary | ICD-10-CM

## 2021-08-25 DIAGNOSIS — G47.00 INSOMNIA, UNSPECIFIED TYPE: ICD-10-CM

## 2021-08-25 PROCEDURE — 99214 OFFICE O/P EST MOD 30 MIN: CPT | Performed by: OTHER

## 2021-08-25 RX ORDER — AMITRIPTYLINE HYDROCHLORIDE 150 MG/1
150 TABLET, FILM COATED ORAL NIGHTLY
Qty: 90 TABLET | Refills: 1 | Status: SHIPPED | OUTPATIENT
Start: 2021-08-25 | End: 2022-02-21

## 2021-08-25 NOTE — PROGRESS NOTES
Paula Dub 36  Neurology clinic follow-up note  Reason for visit: Follow-up for refill for nortriptyline, chronic low back pain  PCP: Lisseth Alexandre MD    Chief Complaint: Sleep Problem and Back Pain  I conducted a telehealth visit with Jesse via telephone visit for her chronic insomnia. 08/25/21 interval history: We discussed amitriptyline can increase the risk of cardiac arrhythmias. We discussed all the adverse effects of amitriptyline.   Explained that amitriptyline and nortriptyline ca the brain was ordered. As well as an MRI of the cervical spine. 4. 1/7/2020 office visit: Intermittent tremor still persisted, at times making it difficult for her screw off the lid. Reported dropping things. Also had difficulty with fine motor tasks. • Cancer Other 39        breast cancer   • Other (breast cancer) Other         42's   • Other (colon cancer) Other         42's   • Breast Cancer Maternal Great-Grandparent         mat great aunt breast ca early 42's.     • Hypertension Father        Soci Lab Results   Component Value Date    BUN 5 (L) 05/15/2021    CA 8.5 05/15/2021    ALT 22 05/15/2021    AST 13 (L) 05/15/2021    ALB 3.3 (L) 05/15/2021     05/15/2021    K 3.5 05/15/2021     05/15/2021    CO2 28.0 05/15/2021      I have re medical attention immediately if symptoms worsen. Patient verbalized understanding of information given. All questions were answered. All side effects of drugs were discussed. Total time on the phone was 10 minutes.       Return to clinic in:  Sept 202

## 2021-09-01 ENCOUNTER — OFFICE VISIT (OUTPATIENT)
Dept: PHYSICAL MEDICINE AND REHAB | Facility: CLINIC | Age: 41
End: 2021-09-01
Payer: COMMERCIAL

## 2021-09-01 VITALS
HEIGHT: 61 IN | SYSTOLIC BLOOD PRESSURE: 130 MMHG | WEIGHT: 199 LBS | BODY MASS INDEX: 37.57 KG/M2 | OXYGEN SATURATION: 98 % | DIASTOLIC BLOOD PRESSURE: 90 MMHG | HEART RATE: 114 BPM

## 2021-09-01 DIAGNOSIS — M51.16 LUMBAR DISC HERNIATION WITH RADICULOPATHY: ICD-10-CM

## 2021-09-01 DIAGNOSIS — M51.37 DDD (DEGENERATIVE DISC DISEASE), LUMBOSACRAL: ICD-10-CM

## 2021-09-01 DIAGNOSIS — M79.10 MYALGIA: ICD-10-CM

## 2021-09-01 DIAGNOSIS — M54.59 MECHANICAL LOW BACK PAIN: Primary | ICD-10-CM

## 2021-09-01 DIAGNOSIS — M47.816 LUMBAR SPONDYLOSIS: ICD-10-CM

## 2021-09-01 DIAGNOSIS — M54.59 LUMBAR TRIGGER POINT SYNDROME: ICD-10-CM

## 2021-09-01 DIAGNOSIS — M51.26 BULGE OF LUMBAR DISC WITHOUT MYELOPATHY: ICD-10-CM

## 2021-09-01 DIAGNOSIS — M48.061 LUMBAR FORAMINAL STENOSIS: ICD-10-CM

## 2021-09-01 DIAGNOSIS — M47.816 FACET SYNDROME, LUMBAR: ICD-10-CM

## 2021-09-01 DIAGNOSIS — E66.01 CLASS 2 SEVERE OBESITY DUE TO EXCESS CALORIES WITH SERIOUS COMORBIDITY AND BODY MASS INDEX (BMI) OF 37.0 TO 37.9 IN ADULT (HCC): ICD-10-CM

## 2021-09-01 PROBLEM — M51.369 BULGE OF LUMBAR DISC WITHOUT MYELOPATHY: Status: ACTIVE | Noted: 2021-09-01

## 2021-09-01 PROBLEM — M51.36 BULGE OF LUMBAR DISC WITHOUT MYELOPATHY: Status: ACTIVE | Noted: 2021-09-01

## 2021-09-01 PROBLEM — M51.379 DDD (DEGENERATIVE DISC DISEASE), LUMBOSACRAL: Status: ACTIVE | Noted: 2021-09-01

## 2021-09-01 PROBLEM — E66.812 CLASS 2 SEVERE OBESITY DUE TO EXCESS CALORIES WITH SERIOUS COMORBIDITY AND BODY MASS INDEX (BMI) OF 37.0 TO 37.9 IN ADULT (HCC): Status: ACTIVE | Noted: 2021-09-01

## 2021-09-01 PROCEDURE — 3080F DIAST BP >= 90 MM HG: CPT | Performed by: PHYSICAL MEDICINE & REHABILITATION

## 2021-09-01 PROCEDURE — 99214 OFFICE O/P EST MOD 30 MIN: CPT | Performed by: PHYSICAL MEDICINE & REHABILITATION

## 2021-09-01 PROCEDURE — 3075F SYST BP GE 130 - 139MM HG: CPT | Performed by: PHYSICAL MEDICINE & REHABILITATION

## 2021-09-01 PROCEDURE — 3008F BODY MASS INDEX DOCD: CPT | Performed by: PHYSICAL MEDICINE & REHABILITATION

## 2021-09-01 RX ORDER — DICLOFENAC SODIUM 75 MG/1
75 TABLET, DELAYED RELEASE ORAL 2 TIMES DAILY
Qty: 60 TABLET | Refills: 1 | Status: SHIPPED | OUTPATIENT
Start: 2021-09-01

## 2021-09-01 RX ORDER — CYCLOBENZAPRINE HCL 5 MG
TABLET ORAL
Qty: 90 TABLET | Refills: 0 | Status: SHIPPED | OUTPATIENT
Start: 2021-09-01

## 2021-09-01 NOTE — PATIENT INSTRUCTIONS
1) Get Xr of the lumbar spine today on your way out  2) Begin formal physical therapy as soon as you can.  This can be at the [facility]  3) Take Diclofenac 75 mg 1 tablet twice per day with food for the next two weeks and then as needed but no more t

## 2021-09-01 NOTE — PROGRESS NOTES
[address]  Progress Note    CHIEF COMPLAINT:  Patient presents with:  Low Back Pain: LOV: 8/24/2019 Patient comes in for bilateral low back pain that radiates to both legs, denies N/T.   Pain started yrs ago Substance and Sexual Activity      Alcohol use: No        Alcohol/week: 0.0 standard drinks        Comment: rare      Drug use: No      Sexual activity: Not on file      FAMILY HISTORY:   Family History   Problem Relation Age of Onset   • Diabetes Mother HPI  Endo/Heme/Allergies: Negative. Psychiatric/Behavioral: Negative. All other systems reviewed and are negative. Pertinent positives and negatives noted in the HPI.         PHYSICAL EXAM:   /90   Pulse 114   Ht 61\"   Wt 199 lb (90.3 kg)   S 117  68 - 126 mg/dL Final   • Cholesterol, Total 05/15/2021 184  <200 mg/dL Final   • HDL Cholesterol 05/15/2021 53  40 - 59 mg/dL Final   • Triglycerides 05/15/2021 77  30 - 149 mg/dL Final   • LDL Cholesterol 05/15/2021 116* <100 mg/dL Final   • VLDL 05/ Final   • MCH 05/15/2021 23.4* 26.0 - 34.0 pg Final   • MCHC 05/15/2021 30.8* 31.0 - 37.0 g/dL Final   • RDW-SD 05/15/2021 41.1  35.1 - 46.3 fL Final   • RDW 05/15/2021 15.0  11.0 - 15.0 % Final   • PLT 05/15/2021 471.0* 150.0 - 450.0 10(3)uL Final   • Ira diagnosis)  Lumbar spondylosis  DDD (degenerative disc disease), lumbosacral  Bulge of lumbar disc without myelopathy  Lumbar foraminal stenosis  Lumbar disc herniation with radiculopathy  Facet syndrome, lumbar  Lumbar trigger point syndrome  Myalgia  Cla

## 2021-09-05 ENCOUNTER — HOSPITAL ENCOUNTER (OUTPATIENT)
Dept: GENERAL RADIOLOGY | Age: 41
Discharge: HOME | End: 2021-09-05
Attending: PHYSICAL MEDICINE & REHABILITATION
Payer: COMMERCIAL

## 2021-09-05 DIAGNOSIS — M48.061 LUMBAR FORAMINAL STENOSIS: ICD-10-CM

## 2021-09-05 DIAGNOSIS — M47.816 LUMBAR SPONDYLOSIS: ICD-10-CM

## 2021-09-05 DIAGNOSIS — M47.816 FACET SYNDROME, LUMBAR: ICD-10-CM

## 2021-09-05 DIAGNOSIS — M51.37 DDD (DEGENERATIVE DISC DISEASE), LUMBOSACRAL: ICD-10-CM

## 2021-09-05 DIAGNOSIS — E66.01 CLASS 2 SEVERE OBESITY DUE TO EXCESS CALORIES WITH SERIOUS COMORBIDITY AND BODY MASS INDEX (BMI) OF 37.0 TO 37.9 IN ADULT (HCC): ICD-10-CM

## 2021-09-05 DIAGNOSIS — M54.59 MECHANICAL LOW BACK PAIN: ICD-10-CM

## 2021-09-05 DIAGNOSIS — M79.10 MYALGIA: ICD-10-CM

## 2021-09-05 DIAGNOSIS — M51.16 LUMBAR DISC HERNIATION WITH RADICULOPATHY: ICD-10-CM

## 2021-09-05 DIAGNOSIS — M51.26 BULGE OF LUMBAR DISC WITHOUT MYELOPATHY: ICD-10-CM

## 2021-09-05 DIAGNOSIS — M54.59 LUMBAR TRIGGER POINT SYNDROME: ICD-10-CM

## 2021-09-05 PROCEDURE — 72110 X-RAY EXAM L-2 SPINE 4/>VWS: CPT | Performed by: PHYSICAL MEDICINE & REHABILITATION

## 2021-10-22 ENCOUNTER — HOSPITAL ENCOUNTER (OUTPATIENT)
Dept: ULTRASOUND IMAGING | Age: 41
Discharge: HOME | End: 2021-10-22
Attending: INTERNAL MEDICINE
Payer: COMMERCIAL

## 2021-10-22 DIAGNOSIS — E04.1 THYROID NODULE: ICD-10-CM

## 2021-10-22 PROCEDURE — 76536 US EXAM OF HEAD AND NECK: CPT | Performed by: INTERNAL MEDICINE

## 2022-02-11 ENCOUNTER — TELEMEDICINE (OUTPATIENT)
Dept: NEUROLOGY | Facility: CLINIC | Age: 42
End: 2022-02-11
Payer: COMMERCIAL

## 2022-02-11 DIAGNOSIS — G47.00 INSOMNIA, UNSPECIFIED TYPE: ICD-10-CM

## 2022-02-11 DIAGNOSIS — M54.16 LUMBAR RADICULOPATHY: ICD-10-CM

## 2022-02-11 DIAGNOSIS — G25.2 ACTION TREMOR: Primary | ICD-10-CM

## 2022-02-11 DIAGNOSIS — G43.009 MIGRAINE WITHOUT AURA AND WITHOUT STATUS MIGRAINOSUS, NOT INTRACTABLE: ICD-10-CM

## 2022-02-11 PROCEDURE — 99443 PHONE E/M BY PHYS 21-30 MIN: CPT | Performed by: OTHER

## 2022-02-11 RX ORDER — AMITRIPTYLINE HYDROCHLORIDE 150 MG/1
150 TABLET, FILM COATED ORAL NIGHTLY
Qty: 90 TABLET | Refills: 1 | Status: SHIPPED | OUTPATIENT
Start: 2022-02-11 | End: 2022-08-10

## 2022-02-11 RX ORDER — GABAPENTIN 300 MG/1
300 CAPSULE ORAL 3 TIMES DAILY
Qty: 270 CAPSULE | Refills: 0 | Status: SHIPPED | OUTPATIENT
Start: 2022-02-11 | End: 2022-05-12

## 2022-02-11 RX ORDER — RIMEGEPANT SULFATE 75 MG/75MG
75 TABLET, ORALLY DISINTEGRATING ORAL AS NEEDED
Qty: 16 TABLET | Refills: 5 | Status: SHIPPED | OUTPATIENT
Start: 2022-02-11 | End: 2022-03-13

## 2022-02-11 RX ORDER — MAGNESIUM OXIDE 400 MG/1
400 TABLET ORAL DAILY
Qty: 90 TABLET | Refills: 3 | Status: SHIPPED | OUTPATIENT
Start: 2022-02-11 | End: 2023-02-06

## 2022-02-15 ENCOUNTER — TELEPHONE (OUTPATIENT)
Dept: NEUROLOGY | Facility: CLINIC | Age: 42
End: 2022-02-15

## 2022-02-15 NOTE — TELEPHONE ENCOUNTER
Fax received from [facility] regarding recent prior authorization for Nurtec ODT 75mg (Rimegepant). [facility] did not approve medication due to medication not meeting [facility] service benefit plan criteria due to the following reason: The use of this medication without having completed an adequate 3 month trial or an intolerance or contraindication at least two of the following triptan agents: Amerge, Axert, Frova, Maxalt, Relpax, Imitrex, or zomig does not establish medical necessity for this drug. Fax sent to scanning.

## 2022-04-01 RX ORDER — AMITRIPTYLINE HYDROCHLORIDE 100 MG/1
TABLET ORAL
Qty: 90 TABLET | Refills: 0 | OUTPATIENT
Start: 2022-04-01

## 2022-04-11 ENCOUNTER — OFFICE VISIT (OUTPATIENT)
Dept: INTERNAL MEDICINE CLINIC | Facility: CLINIC | Age: 42
End: 2022-04-11
Payer: COMMERCIAL

## 2022-04-11 VITALS
SYSTOLIC BLOOD PRESSURE: 129 MMHG | HEIGHT: 61 IN | WEIGHT: 203 LBS | DIASTOLIC BLOOD PRESSURE: 83 MMHG | BODY MASS INDEX: 38.33 KG/M2 | HEART RATE: 118 BPM

## 2022-04-11 DIAGNOSIS — G43.009 MIGRAINE WITHOUT AURA AND WITHOUT STATUS MIGRAINOSUS, NOT INTRACTABLE: ICD-10-CM

## 2022-04-11 DIAGNOSIS — M48.061 LUMBAR FORAMINAL STENOSIS: ICD-10-CM

## 2022-04-11 DIAGNOSIS — R00.0 TACHYCARDIA: ICD-10-CM

## 2022-04-11 DIAGNOSIS — M51.26 BULGE OF LUMBAR DISC WITHOUT MYELOPATHY: ICD-10-CM

## 2022-04-11 DIAGNOSIS — G44.211 INTRACTABLE EPISODIC TENSION-TYPE HEADACHE: Primary | ICD-10-CM

## 2022-04-11 DIAGNOSIS — M54.42 CHRONIC BILATERAL LOW BACK PAIN WITH BILATERAL SCIATICA: ICD-10-CM

## 2022-04-11 DIAGNOSIS — Z00.00 ANNUAL PHYSICAL EXAM: ICD-10-CM

## 2022-04-11 DIAGNOSIS — R13.14 PHARYNGOESOPHAGEAL DYSPHAGIA: ICD-10-CM

## 2022-04-11 DIAGNOSIS — R13.12 OROPHARYNGEAL DYSPHAGIA: ICD-10-CM

## 2022-04-11 DIAGNOSIS — R73.03 PRE-DIABETES: ICD-10-CM

## 2022-04-11 DIAGNOSIS — G89.29 CHRONIC BILATERAL LOW BACK PAIN WITH BILATERAL SCIATICA: ICD-10-CM

## 2022-04-11 DIAGNOSIS — M54.41 CHRONIC BILATERAL LOW BACK PAIN WITH BILATERAL SCIATICA: ICD-10-CM

## 2022-04-11 PROCEDURE — 3079F DIAST BP 80-89 MM HG: CPT | Performed by: NURSE PRACTITIONER

## 2022-04-11 PROCEDURE — 99214 OFFICE O/P EST MOD 30 MIN: CPT | Performed by: NURSE PRACTITIONER

## 2022-04-11 PROCEDURE — 3074F SYST BP LT 130 MM HG: CPT | Performed by: NURSE PRACTITIONER

## 2022-04-11 PROCEDURE — 3008F BODY MASS INDEX DOCD: CPT | Performed by: NURSE PRACTITIONER

## 2022-04-11 RX ORDER — METOPROLOL SUCCINATE 25 MG/1
25 TABLET, EXTENDED RELEASE ORAL DAILY
Qty: 90 TABLET | Refills: 0 | Status: SHIPPED | OUTPATIENT
Start: 2022-04-11

## 2022-04-11 NOTE — ASSESSMENT & PLAN NOTE
Intermittent lower back pain. It radiates down her legs sometimes. Occurs mostly when she is lifting. No pain or tenderness on palpation. Straight leg test is negative. No pain at OV. Plan    See physiatry  Try lidocaine patch to lower mid back.

## 2022-04-11 NOTE — ASSESSMENT & PLAN NOTE
Patient has a hx of migraine headaches. She had tried various prescriptions and over the counter medications including Excedrin    Plan  Continue to follow with Dr. Cinthya Nye for her headaches. Food diary to determine if a certain food is causing her headache.    Sleep with a rolled towel under your neck, no pillow, on your back on a firm mattress

## 2022-04-25 ENCOUNTER — HOSPITAL ENCOUNTER (OUTPATIENT)
Dept: ULTRASOUND IMAGING | Age: 42
Discharge: HOME | End: 2022-04-25
Attending: INTERNAL MEDICINE
Payer: COMMERCIAL

## 2022-04-25 DIAGNOSIS — R74.8 ELEVATED ALKALINE PHOSPHATASE LEVEL: ICD-10-CM

## 2022-04-25 PROCEDURE — 76705 ECHO EXAM OF ABDOMEN: CPT | Performed by: INTERNAL MEDICINE

## 2022-04-30 ENCOUNTER — LAB ENCOUNTER (OUTPATIENT)
Dept: LAB | Age: 42
End: 2022-04-30
Attending: NURSE PRACTITIONER
Payer: COMMERCIAL

## 2022-04-30 DIAGNOSIS — Z00.00 ANNUAL PHYSICAL EXAM: ICD-10-CM

## 2022-04-30 DIAGNOSIS — R00.0 TACHYCARDIA: ICD-10-CM

## 2022-04-30 DIAGNOSIS — R73.03 PRE-DIABETES: ICD-10-CM

## 2022-04-30 DIAGNOSIS — G44.211 INTRACTABLE EPISODIC TENSION-TYPE HEADACHE: ICD-10-CM

## 2022-04-30 DIAGNOSIS — M48.061 LUMBAR FORAMINAL STENOSIS: ICD-10-CM

## 2022-04-30 LAB
ALBUMIN SERPL-MCNC: 3.4 G/DL (ref 3.4–5)
ALBUMIN/GLOB SERPL: 0.8 {RATIO} (ref 1–2)
ALP LIVER SERPL-CCNC: 108 U/L
ALT SERPL-CCNC: 21 U/L
ANION GAP SERPL CALC-SCNC: 9 MMOL/L (ref 0–18)
AST SERPL-CCNC: 13 U/L (ref 15–37)
BASOPHILS # BLD AUTO: 0.08 X10(3) UL (ref 0–0.2)
BASOPHILS NFR BLD AUTO: 0.9 %
BILIRUB SERPL-MCNC: 0.3 MG/DL (ref 0.1–2)
BILIRUB UR QL: NEGATIVE
BUN BLD-MCNC: 9 MG/DL (ref 7–18)
BUN/CREAT SERPL: 11 (ref 10–20)
CALCIUM BLD-MCNC: 9.3 MG/DL (ref 8.5–10.1)
CHLORIDE SERPL-SCNC: 104 MMOL/L (ref 98–112)
CHOLEST SERPL-MCNC: 186 MG/DL (ref ?–200)
CLARITY UR: CLEAR
CO2 SERPL-SCNC: 26 MMOL/L (ref 21–32)
COLOR UR: YELLOW
CREAT BLD-MCNC: 0.82 MG/DL
DEPRECATED RDW RBC AUTO: 42.3 FL (ref 35.1–46.3)
EOSINOPHIL # BLD AUTO: 0.11 X10(3) UL (ref 0–0.7)
EOSINOPHIL NFR BLD AUTO: 1.3 %
ERYTHROCYTE [DISTWIDTH] IN BLOOD BY AUTOMATED COUNT: 16.5 % (ref 11–15)
EST. AVERAGE GLUCOSE BLD GHB EST-MCNC: 120 MG/DL (ref 68–126)
FASTING PATIENT LIPID ANSWER: NO
FASTING STATUS PATIENT QL REPORTED: NO
GLOBULIN PLAS-MCNC: 4.1 G/DL (ref 2.8–4.4)
GLUCOSE BLD-MCNC: 112 MG/DL (ref 70–99)
GLUCOSE UR-MCNC: NEGATIVE MG/DL
HBA1C MFR BLD: 5.8 % (ref ?–5.7)
HCT VFR BLD AUTO: 35 %
HDLC SERPL-MCNC: 50 MG/DL (ref 40–59)
HGB BLD-MCNC: 10.3 G/DL
HGB UR QL STRIP.AUTO: NEGATIVE
IMM GRANULOCYTES # BLD AUTO: 0.03 X10(3) UL (ref 0–1)
IMM GRANULOCYTES NFR BLD: 0.4 %
KETONES UR-MCNC: NEGATIVE MG/DL
LDLC SERPL CALC-MCNC: 114 MG/DL (ref ?–100)
LEUKOCYTE ESTERASE UR QL STRIP.AUTO: NEGATIVE
LYMPHOCYTES # BLD AUTO: 1.94 X10(3) UL (ref 1–4)
LYMPHOCYTES NFR BLD AUTO: 22.7 %
MCH RBC QN AUTO: 21.1 PG (ref 26–34)
MCHC RBC AUTO-ENTMCNC: 29.4 G/DL (ref 31–37)
MCV RBC AUTO: 71.9 FL
MONOCYTES # BLD AUTO: 0.6 X10(3) UL (ref 0.1–1)
MONOCYTES NFR BLD AUTO: 7 %
NEUTROPHILS # BLD AUTO: 5.8 X10 (3) UL (ref 1.5–7.7)
NEUTROPHILS # BLD AUTO: 5.8 X10(3) UL (ref 1.5–7.7)
NEUTROPHILS NFR BLD AUTO: 67.7 %
NITRITE UR QL STRIP.AUTO: NEGATIVE
NONHDLC SERPL-MCNC: 136 MG/DL (ref ?–130)
OSMOLALITY SERPL CALC.SUM OF ELEC: 287 MOSM/KG (ref 275–295)
PH UR: 6 [PH] (ref 5–8)
PLATELET # BLD AUTO: 547 10(3)UL (ref 150–450)
POTASSIUM SERPL-SCNC: 3.7 MMOL/L (ref 3.5–5.1)
PROT SERPL-MCNC: 7.5 G/DL (ref 6.4–8.2)
PROT UR-MCNC: 30 MG/DL
RBC # BLD AUTO: 4.87 X10(6)UL
SODIUM SERPL-SCNC: 139 MMOL/L (ref 136–145)
SP GR UR STRIP: 1.01 (ref 1–1.03)
TRIGL SERPL-MCNC: 125 MG/DL (ref 30–149)
TSI SER-ACNC: 0.55 MIU/ML (ref 0.36–3.74)
UROBILINOGEN UR STRIP-ACNC: <2
VIT B12 SERPL-MCNC: 460 PG/ML (ref 193–986)
VIT C UR-MCNC: NEGATIVE MG/DL
VIT D+METAB SERPL-MCNC: 10.4 NG/ML (ref 30–100)
VLDLC SERPL CALC-MCNC: 22 MG/DL (ref 0–30)
WBC # BLD AUTO: 8.6 X10(3) UL (ref 4–11)

## 2022-04-30 PROCEDURE — 85025 COMPLETE CBC W/AUTO DIFF WBC: CPT

## 2022-04-30 PROCEDURE — 80053 COMPREHEN METABOLIC PANEL: CPT

## 2022-04-30 PROCEDURE — 80061 LIPID PANEL: CPT

## 2022-04-30 PROCEDURE — 83036 HEMOGLOBIN GLYCOSYLATED A1C: CPT

## 2022-04-30 PROCEDURE — 84443 ASSAY THYROID STIM HORMONE: CPT

## 2022-04-30 PROCEDURE — 36415 COLL VENOUS BLD VENIPUNCTURE: CPT

## 2022-04-30 PROCEDURE — 82607 VITAMIN B-12: CPT

## 2022-04-30 PROCEDURE — 82306 VITAMIN D 25 HYDROXY: CPT

## 2022-04-30 PROCEDURE — 81001 URINALYSIS AUTO W/SCOPE: CPT

## 2022-05-11 ENCOUNTER — TELEMEDICINE (OUTPATIENT)
Dept: INTERNAL MEDICINE CLINIC | Facility: CLINIC | Age: 42
End: 2022-05-11

## 2022-05-11 DIAGNOSIS — K82.4 POLYP OF GALLBLADDER: ICD-10-CM

## 2022-05-11 DIAGNOSIS — R06.00 DYSPNEA ON EXERTION: ICD-10-CM

## 2022-05-11 DIAGNOSIS — E55.9 VITAMIN D DEFICIENCY: Primary | ICD-10-CM

## 2022-05-11 PROBLEM — D64.89 OTHER SPECIFIED ANEMIAS: Status: ACTIVE | Noted: 2022-05-11

## 2022-05-11 PROBLEM — N92.1 METRORRHAGIA: Status: ACTIVE | Noted: 2022-05-11

## 2022-05-11 PROCEDURE — 99214 OFFICE O/P EST MOD 30 MIN: CPT | Performed by: NURSE PRACTITIONER

## 2022-05-11 RX ORDER — ALBUTEROL SULFATE 90 UG/1
2 AEROSOL, METERED RESPIRATORY (INHALATION) EVERY 4 HOURS PRN
Qty: 1 EACH | Refills: 0 | Status: SHIPPED | OUTPATIENT
Start: 2022-05-11

## 2022-05-11 NOTE — ASSESSMENT & PLAN NOTE
Vitamin D level 10. Plan  Patient taking. .  Please take this ONCE A WEEK FOR 12 weeks. Then we should repeat the vitamin D level.

## 2022-05-11 NOTE — ASSESSMENT & PLAN NOTE
Patient with unpredictable heavy periods. She can go through 5 pads in an hour. Her hemoglobin is 10.3. Plan  Make an appointment with Dr Martha Cleary take ibuprofen 2 tabs every 8 hours to try to slow menstrual flow.

## 2022-06-05 ENCOUNTER — HOSPITAL ENCOUNTER (OUTPATIENT)
Dept: MAMMOGRAPHY | Facility: HOSPITAL | Age: 42
Discharge: HOME | End: 2022-06-05
Attending: NURSE PRACTITIONER
Payer: COMMERCIAL

## 2022-06-05 ENCOUNTER — HOSPITAL ENCOUNTER (OUTPATIENT)
Dept: MAMMOGRAPHY | Facility: HOSPITAL | Age: 42
End: 2022-06-05
Attending: NURSE PRACTITIONER
Payer: COMMERCIAL

## 2022-06-05 DIAGNOSIS — Z12.31 BREAST CANCER SCREENING BY MAMMOGRAM: ICD-10-CM

## 2022-06-05 PROCEDURE — 77063 BREAST TOMOSYNTHESIS BI: CPT | Performed by: NURSE PRACTITIONER

## 2022-06-05 PROCEDURE — 77067 SCR MAMMO BI INCL CAD: CPT | Performed by: NURSE PRACTITIONER

## 2022-07-13 ENCOUNTER — HOSPITAL ENCOUNTER (OUTPATIENT)
Dept: ULTRASOUND IMAGING | Facility: HOSPITAL | Age: 42
Discharge: HOME | End: 2022-07-13
Attending: NURSE PRACTITIONER
Payer: COMMERCIAL

## 2022-07-13 DIAGNOSIS — R13.12 OROPHARYNGEAL DYSPHAGIA: ICD-10-CM

## 2022-07-13 PROCEDURE — 76536 US EXAM OF HEAD AND NECK: CPT | Performed by: NURSE PRACTITIONER

## 2022-07-19 ENCOUNTER — TELEPHONE (OUTPATIENT)
Dept: ENDOCRINOLOGY CLINIC | Facility: CLINIC | Age: 42
End: 2022-07-19

## 2022-07-20 ENCOUNTER — TELEMEDICINE (OUTPATIENT)
Dept: ENDOCRINOLOGY CLINIC | Facility: CLINIC | Age: 42
End: 2022-07-20

## 2022-07-20 DIAGNOSIS — R13.10 DYSPHAGIA, UNSPECIFIED TYPE: Primary | ICD-10-CM

## 2022-07-20 DIAGNOSIS — E04.1 THYROID NODULE: ICD-10-CM

## 2022-07-20 PROCEDURE — 99213 OFFICE O/P EST LOW 20 MIN: CPT | Performed by: INTERNAL MEDICINE

## 2022-08-19 ENCOUNTER — PATIENT MESSAGE (OUTPATIENT)
Dept: NEUROLOGY | Facility: CLINIC | Age: 42
End: 2022-08-19

## 2022-08-19 DIAGNOSIS — G25.2 ACTION TREMOR: Primary | ICD-10-CM

## 2022-08-19 DIAGNOSIS — G43.009 MIGRAINE WITHOUT AURA AND WITHOUT STATUS MIGRAINOSUS, NOT INTRACTABLE: ICD-10-CM

## 2022-08-19 NOTE — TELEPHONE ENCOUNTER
From: Jennifer Florian  To: Yohana Boss DO  Sent: 8/19/2022 12:23 PM CDT  Subject: Refill    Dr. Yocasta Acevedo can you please give me a refill on the amitrptyline.     Thanks

## 2022-08-22 RX ORDER — AMITRIPTYLINE HYDROCHLORIDE 150 MG/1
150 TABLET, FILM COATED ORAL NIGHTLY
Qty: 90 TABLET | Refills: 0 | Status: SHIPPED | OUTPATIENT
Start: 2022-08-22

## 2022-08-27 ENCOUNTER — EXTERNAL RECORD (OUTPATIENT)
Dept: OTHER | Age: 42
End: 2022-08-27

## 2022-09-01 ENCOUNTER — OFFICE VISIT (OUTPATIENT)
Dept: OTOLARYNGOLOGY | Facility: CLINIC | Age: 42
End: 2022-09-01
Payer: COMMERCIAL

## 2022-09-01 VITALS — HEIGHT: 61 IN | BODY MASS INDEX: 38.33 KG/M2 | WEIGHT: 203 LBS

## 2022-09-01 DIAGNOSIS — R13.14 PHARYNGOESOPHAGEAL DYSPHAGIA: ICD-10-CM

## 2022-09-01 DIAGNOSIS — E04.9 GOITER, NODULAR: Primary | ICD-10-CM

## 2022-09-01 DIAGNOSIS — K21.9 GASTROESOPHAGEAL REFLUX DISEASE, UNSPECIFIED WHETHER ESOPHAGITIS PRESENT: ICD-10-CM

## 2022-09-01 PROCEDURE — 3008F BODY MASS INDEX DOCD: CPT | Performed by: SPECIALIST

## 2022-09-01 PROCEDURE — 99213 OFFICE O/P EST LOW 20 MIN: CPT | Performed by: SPECIALIST

## 2022-09-01 RX ORDER — CYCLOBENZAPRINE HCL 5 MG
TABLET ORAL
COMMUNITY

## 2022-09-01 RX ORDER — GABAPENTIN 300 MG/1
CAPSULE ORAL
COMMUNITY

## 2022-09-01 RX ORDER — OMEPRAZOLE 40 MG/1
40 CAPSULE, DELAYED RELEASE ORAL DAILY
Qty: 30 CAPSULE | Refills: 2 | Status: SHIPPED | OUTPATIENT
Start: 2022-09-01

## 2022-09-01 RX ORDER — TRANEXAMIC ACID 650 1/1
1300 TABLET ORAL 3 TIMES DAILY
COMMUNITY
Start: 2022-08-16

## 2022-09-01 NOTE — PATIENT INSTRUCTIONS
And a barium esophagram and placed you on an antireflux diet.   I also placed you on a trial of omeprazole  Please stop the omeprazole 2 to 3 days prior to the barium esophagram

## 2022-09-07 RX ORDER — OMEPRAZOLE 40 MG/1
40 CAPSULE, DELAYED RELEASE ORAL DAILY
Qty: 90 CAPSULE | Refills: 0 | Status: SHIPPED | OUTPATIENT
Start: 2022-09-07

## 2022-09-09 DIAGNOSIS — G43.009 MIGRAINE WITHOUT AURA AND WITHOUT STATUS MIGRAINOSUS, NOT INTRACTABLE: ICD-10-CM

## 2022-09-09 DIAGNOSIS — G25.2 ACTION TREMOR: ICD-10-CM

## 2022-09-09 RX ORDER — AMITRIPTYLINE HYDROCHLORIDE 150 MG/1
150 TABLET ORAL NIGHTLY
Qty: 90 TABLET | Refills: 0 | Status: CANCELLED | OUTPATIENT
Start: 2022-09-09

## 2022-09-18 ENCOUNTER — LAB ENCOUNTER (OUTPATIENT)
Dept: LAB | Facility: HOSPITAL | Age: 42
End: 2022-09-18
Attending: OBSTETRICS & GYNECOLOGY

## 2022-09-18 DIAGNOSIS — Z01.818 PREOP TESTING: ICD-10-CM

## 2022-09-19 LAB — SARS-COV-2 RNA RESP QL NAA+PROBE: NOT DETECTED

## 2022-09-21 ENCOUNTER — EXTERNAL RECORD (OUTPATIENT)
Dept: HEALTH INFORMATION MANAGEMENT | Facility: OTHER | Age: 42
End: 2022-09-21

## 2022-09-21 ENCOUNTER — ANESTHESIA (OUTPATIENT)
Dept: SURGERY | Facility: HOSPITAL | Age: 42
End: 2022-09-21

## 2022-09-21 ENCOUNTER — ANESTHESIA EVENT (OUTPATIENT)
Dept: SURGERY | Facility: HOSPITAL | Age: 42
End: 2022-09-21

## 2022-09-21 ENCOUNTER — HOSPITAL ENCOUNTER (OUTPATIENT)
Facility: HOSPITAL | Age: 42
Setting detail: HOSPITAL OUTPATIENT SURGERY
Discharge: HOME | End: 2022-09-21
Attending: OBSTETRICS & GYNECOLOGY | Admitting: OBSTETRICS & GYNECOLOGY

## 2022-09-21 VITALS
WEIGHT: 200 LBS | HEIGHT: 61 IN | TEMPERATURE: 98 F | SYSTOLIC BLOOD PRESSURE: 139 MMHG | DIASTOLIC BLOOD PRESSURE: 86 MMHG | OXYGEN SATURATION: 96 % | BODY MASS INDEX: 37.76 KG/M2 | HEART RATE: 92 BPM | RESPIRATION RATE: 18 BRPM

## 2022-09-21 DIAGNOSIS — Z01.818 PREOP TESTING: Primary | ICD-10-CM

## 2022-09-21 PROBLEM — N92.0 MENORRHAGIA: Status: ACTIVE | Noted: 2022-09-21

## 2022-09-21 LAB
B-HCG UR QL: NEGATIVE
BASOPHILS # BLD AUTO: 0.06 X10(3) UL (ref 0–0.2)
BASOPHILS NFR BLD AUTO: 0.7 %
DEPRECATED RDW RBC AUTO: 41.4 FL (ref 35.1–46.3)
EOSINOPHIL # BLD AUTO: 0.15 X10(3) UL (ref 0–0.7)
EOSINOPHIL NFR BLD AUTO: 1.8 %
ERYTHROCYTE [DISTWIDTH] IN BLOOD BY AUTOMATED COUNT: 17 % (ref 11–15)
HCT VFR BLD AUTO: 35.5 %
HGB BLD-MCNC: 10.5 G/DL
IMM GRANULOCYTES # BLD AUTO: 0.02 X10(3) UL (ref 0–1)
IMM GRANULOCYTES NFR BLD: 0.2 %
LYMPHOCYTES # BLD AUTO: 2.19 X10(3) UL (ref 1–4)
LYMPHOCYTES NFR BLD AUTO: 26.9 %
MCH RBC QN AUTO: 20.3 PG (ref 26–34)
MCHC RBC AUTO-ENTMCNC: 29.6 G/DL (ref 31–37)
MCV RBC AUTO: 68.5 FL
MONOCYTES # BLD AUTO: 0.52 X10(3) UL (ref 0.1–1)
MONOCYTES NFR BLD AUTO: 6.4 %
NEUTROPHILS # BLD AUTO: 5.21 X10 (3) UL (ref 1.5–7.7)
NEUTROPHILS # BLD AUTO: 5.21 X10(3) UL (ref 1.5–7.7)
NEUTROPHILS NFR BLD AUTO: 64 %
PLATELET # BLD AUTO: 558 10(3)UL (ref 150–450)
RBC # BLD AUTO: 5.18 X10(6)UL
WBC # BLD AUTO: 8.2 X10(3) UL (ref 4–11)

## 2022-09-21 PROCEDURE — 85060 BLOOD SMEAR INTERPRETATION: CPT | Performed by: OBSTETRICS & GYNECOLOGY

## 2022-09-21 PROCEDURE — 88305 TISSUE EXAM BY PATHOLOGIST: CPT | Performed by: OBSTETRICS & GYNECOLOGY

## 2022-09-21 PROCEDURE — 0UDB7ZX EXTRACTION OF ENDOMETRIUM, VIA NATURAL OR ARTIFICIAL OPENING, DIAGNOSTIC: ICD-10-PCS | Performed by: OBSTETRICS & GYNECOLOGY

## 2022-09-21 PROCEDURE — 81025 URINE PREGNANCY TEST: CPT

## 2022-09-21 PROCEDURE — 85025 COMPLETE CBC W/AUTO DIFF WBC: CPT | Performed by: OBSTETRICS & GYNECOLOGY

## 2022-09-21 PROCEDURE — 0UBC8ZX EXCISION OF CERVIX, VIA NATURAL OR ARTIFICIAL OPENING ENDOSCOPIC, DIAGNOSTIC: ICD-10-PCS | Performed by: OBSTETRICS & GYNECOLOGY

## 2022-09-21 PROCEDURE — 0UB98ZX EXCISION OF UTERUS, VIA NATURAL OR ARTIFICIAL OPENING ENDOSCOPIC, DIAGNOSTIC: ICD-10-PCS | Performed by: OBSTETRICS & GYNECOLOGY

## 2022-09-21 RX ORDER — NALOXONE HYDROCHLORIDE 0.4 MG/ML
80 INJECTION, SOLUTION INTRAMUSCULAR; INTRAVENOUS; SUBCUTANEOUS AS NEEDED
Status: DISCONTINUED | OUTPATIENT
Start: 2022-09-21 | End: 2022-09-21

## 2022-09-21 RX ORDER — ONDANSETRON 2 MG/ML
4 INJECTION INTRAMUSCULAR; INTRAVENOUS EVERY 6 HOURS PRN
Status: DISCONTINUED | OUTPATIENT
Start: 2022-09-21 | End: 2022-09-21

## 2022-09-21 RX ORDER — METOPROLOL TARTRATE 5 MG/5ML
2.5 INJECTION INTRAVENOUS ONCE
Status: DISCONTINUED | OUTPATIENT
Start: 2022-09-21 | End: 2022-09-21

## 2022-09-21 RX ORDER — SODIUM CHLORIDE, SODIUM LACTATE, POTASSIUM CHLORIDE, CALCIUM CHLORIDE 600; 310; 30; 20 MG/100ML; MG/100ML; MG/100ML; MG/100ML
INJECTION, SOLUTION INTRAVENOUS CONTINUOUS
Status: DISCONTINUED | OUTPATIENT
Start: 2022-09-21 | End: 2022-09-21

## 2022-09-21 RX ORDER — MORPHINE SULFATE 4 MG/ML
2 INJECTION, SOLUTION INTRAMUSCULAR; INTRAVENOUS EVERY 10 MIN PRN
Status: DISCONTINUED | OUTPATIENT
Start: 2022-09-21 | End: 2022-09-21

## 2022-09-21 RX ORDER — MORPHINE SULFATE 10 MG/ML
6 INJECTION, SOLUTION INTRAMUSCULAR; INTRAVENOUS EVERY 10 MIN PRN
Status: DISCONTINUED | OUTPATIENT
Start: 2022-09-21 | End: 2022-09-21

## 2022-09-21 RX ORDER — SODIUM CHLORIDE, SODIUM LACTATE, POTASSIUM CHLORIDE, CALCIUM CHLORIDE 600; 310; 30; 20 MG/100ML; MG/100ML; MG/100ML; MG/100ML
INJECTION, SOLUTION INTRAVENOUS CONTINUOUS
Status: CANCELLED | OUTPATIENT
Start: 2022-09-21

## 2022-09-21 RX ORDER — IBUPROFEN 200 MG
200 TABLET ORAL EVERY 6 HOURS PRN
Status: DISCONTINUED | OUTPATIENT
Start: 2022-09-21 | End: 2022-09-21

## 2022-09-21 RX ORDER — DEXAMETHASONE SODIUM PHOSPHATE 4 MG/ML
VIAL (ML) INJECTION AS NEEDED
Status: DISCONTINUED | OUTPATIENT
Start: 2022-09-21 | End: 2022-09-21 | Stop reason: SURG

## 2022-09-21 RX ORDER — PROCHLORPERAZINE EDISYLATE 5 MG/ML
5 INJECTION INTRAMUSCULAR; INTRAVENOUS EVERY 8 HOURS PRN
Status: DISCONTINUED | OUTPATIENT
Start: 2022-09-21 | End: 2022-09-21

## 2022-09-21 RX ORDER — IBUPROFEN 600 MG/1
600 TABLET ORAL EVERY 6 HOURS PRN
Status: DISCONTINUED | OUTPATIENT
Start: 2022-09-21 | End: 2022-09-21

## 2022-09-21 RX ORDER — MIDAZOLAM HYDROCHLORIDE 1 MG/ML
INJECTION INTRAMUSCULAR; INTRAVENOUS AS NEEDED
Status: DISCONTINUED | OUTPATIENT
Start: 2022-09-21 | End: 2022-09-21 | Stop reason: SURG

## 2022-09-21 RX ORDER — HYDROMORPHONE HYDROCHLORIDE 1 MG/ML
0.6 INJECTION, SOLUTION INTRAMUSCULAR; INTRAVENOUS; SUBCUTANEOUS EVERY 5 MIN PRN
Status: DISCONTINUED | OUTPATIENT
Start: 2022-09-21 | End: 2022-09-21

## 2022-09-21 RX ORDER — MORPHINE SULFATE 4 MG/ML
4 INJECTION, SOLUTION INTRAMUSCULAR; INTRAVENOUS EVERY 10 MIN PRN
Status: DISCONTINUED | OUTPATIENT
Start: 2022-09-21 | End: 2022-09-21

## 2022-09-21 RX ORDER — HYDROMORPHONE HYDROCHLORIDE 1 MG/ML
0.2 INJECTION, SOLUTION INTRAMUSCULAR; INTRAVENOUS; SUBCUTANEOUS EVERY 5 MIN PRN
Status: DISCONTINUED | OUTPATIENT
Start: 2022-09-21 | End: 2022-09-21

## 2022-09-21 RX ORDER — IBUPROFEN 400 MG/1
400 TABLET ORAL EVERY 6 HOURS PRN
Status: DISCONTINUED | OUTPATIENT
Start: 2022-09-21 | End: 2022-09-21

## 2022-09-21 RX ORDER — ACETAMINOPHEN 500 MG
1000 TABLET ORAL ONCE
Status: COMPLETED | OUTPATIENT
Start: 2022-09-21 | End: 2022-09-21

## 2022-09-21 RX ORDER — FAMOTIDINE 20 MG/1
20 TABLET, FILM COATED ORAL ONCE
Status: COMPLETED | OUTPATIENT
Start: 2022-09-21 | End: 2022-09-21

## 2022-09-21 RX ORDER — LIDOCAINE HYDROCHLORIDE 10 MG/ML
INJECTION, SOLUTION EPIDURAL; INFILTRATION; INTRACAUDAL; PERINEURAL AS NEEDED
Status: DISCONTINUED | OUTPATIENT
Start: 2022-09-21 | End: 2022-09-21 | Stop reason: SURG

## 2022-09-21 RX ORDER — HYDROMORPHONE HYDROCHLORIDE 1 MG/ML
0.4 INJECTION, SOLUTION INTRAMUSCULAR; INTRAVENOUS; SUBCUTANEOUS EVERY 5 MIN PRN
Status: DISCONTINUED | OUTPATIENT
Start: 2022-09-21 | End: 2022-09-21

## 2022-09-21 RX ORDER — ONDANSETRON 2 MG/ML
INJECTION INTRAMUSCULAR; INTRAVENOUS AS NEEDED
Status: DISCONTINUED | OUTPATIENT
Start: 2022-09-21 | End: 2022-09-21 | Stop reason: SURG

## 2022-09-21 RX ADMIN — DEXAMETHASONE SODIUM PHOSPHATE 4 MG: 4 MG/ML VIAL (ML) INJECTION at 10:00:00

## 2022-09-21 RX ADMIN — LIDOCAINE HYDROCHLORIDE 50 MG: 10 INJECTION, SOLUTION EPIDURAL; INFILTRATION; INTRACAUDAL; PERINEURAL at 10:00:00

## 2022-09-21 RX ADMIN — ONDANSETRON 4 MG: 2 INJECTION INTRAMUSCULAR; INTRAVENOUS at 10:00:00

## 2022-09-21 RX ADMIN — SODIUM CHLORIDE, SODIUM LACTATE, POTASSIUM CHLORIDE, CALCIUM CHLORIDE: 600; 310; 30; 20 INJECTION, SOLUTION INTRAVENOUS at 10:37:00

## 2022-09-21 RX ADMIN — MIDAZOLAM HYDROCHLORIDE 2 MG: 1 INJECTION INTRAMUSCULAR; INTRAVENOUS at 09:54:00

## 2022-09-21 RX ADMIN — SODIUM CHLORIDE, SODIUM LACTATE, POTASSIUM CHLORIDE, CALCIUM CHLORIDE: 600; 310; 30; 20 INJECTION, SOLUTION INTRAVENOUS at 10:00:00

## 2022-09-21 NOTE — ANESTHESIA POSTPROCEDURE EVALUATION
Patient: Felicity Camarillo    Procedure Summary     Date: 09/21/22 Room / Location: [facility] OR 04 / [facility] OR    Anesthesia Start: 0384 Anesthesia Stop:     Procedures:       Hysteroscopy, dilation and curettage, endometrial polypectomy (N/A Cervix)      CERVIX POLYPECTOMY (N/A Cervix) Diagnosis: (menorrhagia, endometrial polyp)    Surgeons: Patricia Sal Anesthesiologist: Ken Casillas MD    Anesthesia Type: general ASA Status: 3          Anesthesia Type: general    Vitals Value Taken Time   /83 09/21/22 1112   Temp 97.6 09/21/22 1112   Pulse 102 09/21/22 1112   Resp 14 09/21/22 1112   SpO2 97 % 09/21/22 1112   Vitals shown include unvalidated device data.     [facility] Post Evaluation:   Patient Evaluated in PACU  Patient Participation: complete - patient participated  Level of Consciousness: awake  Pain Management: adequate  Airway Patency:patent  Dental exam unchanged from preop  Yes    Cardiovascular Status: acceptable  Respiratory Status: acceptable  Postoperative Hydration acceptable      Kalpana Vicente MD  9/21/2022 11:12 AM

## 2022-09-21 NOTE — H&P
[facility]    PATIENT'S NAME: Leonora Phelan   ATTENDING PHYSICIAN: Zaida Clemons. MD Mehdi   PATIENT ACCOUNT#:   [id number]    LOCATION:  [facility]  MEDICAL RECORD #:   [id number]       YOB: 1980  ADMISSION DATE:       09/21/2022    HISTORY AND PHYSICAL EXAMINATION    CHIEF COMPLAINT:  Heavy menses and short cycle and endometrial polyp. HISTORY OF PRESENT ILLNESS:  Patient is a 50-year-old female, G2, P1-0-1-1, who underwent a salpingectomy for sterilization in October 2020. She had been on Depo-Provera for several years until that procedure. She was seen in the office in August, complaining that her periods had been quite heavy since going off of the Depo-Provera when the sterilization was done. She claims that she changes her pads 4 times per hour for 5 or 6 days every month. She does not keep track of her cycles, but states that they are sometimes less than 21 days. Patient states that her periods had been this heavy since they began at age 7 and were always this heavy except when she was on Depo-Provera when she was amenorrheic. She states that she wishes that she had just stayed on the Depo-Provera and not had the tubal surgery. With joint decision-making, we decided it would be fine for her to resume the Depo-Provera but, because of her age with abnormally heavy bleeding, she would need an endometrial biopsy or D and C first.  An ultrasound was performed which showed small fibroids, which had been noted on a prior ultrasound, the largest of which was less than 2 cm. There was a hyperechoic lesion seen within the lower aspect of the endometrial canal that the radiologist believes is an endometrial polyp measuring 8 x 5 mm. Patient enters at this time for hysteroscopy, possible polypectomy, D and C to exclude malignancy and premalignant changes as cause of her heavy period and short cycle. Shae Muir PAST MEDICAL HISTORY:  Medical conditions are headaches.     PAST SURGICAL HISTORY:   section in , 2 left knee surgeries in 2019, and a laparoscopic bilateral salpingectomy that I performed in 2020. MEDICATIONS:  Amitriptyline, vitamin D, gabapentin, metoprolol, omeprazole, and tranexamic acid for her heavy menses. ALLERGIES:  None to any medications. FAMILY HISTORY:  Negative for breast, ovarian, uterine, colon, and pancreatic cancer. She had no siblings. Her mother had a hysterectomy done for fibroids. SOCIAL HISTORY:  Never tobacco user. No alcohol or illicit drug use. She is employed in the [employer] at [employer].  She is  and a mother. REVIEW OF SYSTEMS:  No recent respiratory, GI, or  symptoms other than as noted in history of present illness. PHYSICAL EXAMINATION:    GENERAL:  At her last office visit, she was noted to be 5 feet 1 inch, weight 203 pounds, BMI of 38.4. HEENT:  Pupils equal, round, react to light. Nares and throat clear. NECK:  Supple without thyromegaly or adenopathy. LUNGS:  Clear. HEART:  Regular rate and rhythm without murmurs. ABDOMEN:  No hepatosplenomegaly, masses, or tenderness. PELVIC:  External genitalia, vagina, and cervix were normal.  Her most recent Pap smear was March 3, 2020. The bimanual exam showed a uterus that was mobile and nontender, but I was unable to assess uterine size or shape on exam.  The adnexa had no adnexal masses or obvious tenderness. Pelvic exam was limited by the patient's high BMI. IMPRESSION:    1. Endometrial polyps suspected on ultrasound. 2.   Menorrhagia. 3.   Short menstrual cycles. 4.   Uterine fibroids. PLAN:  Hysteroscopic assessment and removal if an endometrial polyp is found, dilation and curettage for endometrial sampling to rule out hyperplasia or neoplasia as a call for her abnormal bleeding. Procedure was explained to the patient in detail who consented to the procedure as described.     (Also job [id number])    Dictated By Dulce Monet. MD Mehdi  d: 09/20/2022 15:31:45  t: 09/20/2022 16:47:47  [facility] [id number]  JEREMY/

## 2022-09-21 NOTE — ANESTHESIA PROCEDURE NOTES
Airway  Date/Time: 9/21/2022 10:04 AM  Urgency: elective    Airway not difficult    General Information and Staff    Patient location during procedure: OR  Anesthesiologist: Ramiro Ellis MD  Resident/CRNA: Harmeet Pitts CRNA  Performed: CRNA     Indications and Patient Condition  Indications for airway management: airway protection and anesthesia  Preoxygenated: yes  Mask difficulty assessment: 0 - not attempted    Final Airway Details  Final airway type: supraglottic airway      Successful airway: Size 4      Number of attempts at approach: 1

## 2022-09-21 NOTE — INTERVAL H&P NOTE
Pre-op Diagnosis: menorrhagia, endometrial polyp    The above referenced H&P was reviewed by Reginald Gonsalves on 9/21/2022, the patient was examined and no significant changes have occurred in the patient's condition since the H&P was performed. I discussed with the patient and/or legal representative the potential benefits, risks and side effects of this procedure; the likelihood of the patient achieving goals; and potential problems that might occur during recuperation. I discussed reasonable alternatives to the procedure, including risks, benefits and side effects related to the alternatives and risks related to not receiving this procedure. We will proceed with procedure as planned.

## 2022-09-21 NOTE — BRIEF OP NOTE
Pre-Operative Diagnosis: menorrhagia, endometrial polyp     Post-Operative Diagnosis: menorrhagia, endocervical polyp     Procedure Performed:   Hysteroscopy, endocervical polypectomy, dilation and curettage,     Surgeon(s) and Role:     * Mina Harding - Primary    Assistant(s):        Surgical Findings: NL endometrial cavity; small proximal endocervical polyp     Specimen: polyp, curettings     Estimated Blood Loss: 5ml    Fluid deficit: [address]  9/21/2022  10:44 AM

## 2022-09-22 NOTE — OPERATIVE REPORT
[facility]    PATIENT'S NAME: Adam Alves   ATTENDING PHYSICIAN: Rip Melton. MD Mehdi   OPERATING PHYSICIAN: Rip Melton. MD Mehdi   PATIENT ACCOUNT#:   [id number]    LOCATION:  [facility] 11 [facility] 10  MEDICAL RECORD #:   [id number]       YOB: 1980  ADMISSION DATE:       09/21/2022      OPERATION DATE:  09/21/2022    OPERATIVE REPORT      PREOPERATIVE DIAGNOSIS:  Menorrhagia, endometrial polyp. POSTOPERATIVE DIAGNOSIS:  Menorrhagia, endocervical polyp. PROCEDURE:  Hysteroscopy, endocervical polypectomy, dilation and curettage. ASSISTANT:  None. OPERATIVE TECHNIQUE:  The patient was brought to the operating room, given a general anesthetic with an LMA. She was placed in lithotomy position using Aakash-type stirrups. Bimanual exam under anesthesia was somewhat limited by her high BMI. The uterus was mobile and there were no adnexal masses palpable. She was prepped and draped in the usual fashion for vaginal surgery. A time-out was performed. A Graves speculum was placed in the vagina. The cervix grasped with a single-tooth tenaculum and dilated to size 7 with Hegar dilators. The MyoSure hysteroscope was used and using saline as the distention medium, the scope was easily advanced through the cervix to the endometrial cavity. The cavity was fully visualized, including both tubal ostia. There were no polyps or masses or abnormalities seen within the cavity. There had been noted to be a polypoid flap of tissue in the proximal cervix. Decision was made to excise this. The MyoSure device was inserted and the endocervical polyp quickly and easily removed with no active bleeding seen. The hysteroscopy was then discontinued. The cervix was dilated to size 8 with Hegar dilators. Endocervical curettage and then endometrial curettage were performed. The procedure was then terminated. There was no bleeding from the tenaculum site. Estimated blood loss was 5 mL. Fluid deficit was 250. The patient tolerated the procedure well and was brought to the recovery room in good condition. Dictated By Poonam Harding MD  d: 09/21/2022 15:23:58  t: 09/21/2022 10:74:97  Job [id number]  [facility]/

## 2022-10-20 RX ORDER — ERGOCALCIFEROL 1.25 MG/1
1.25 CAPSULE ORAL
COMMUNITY

## 2022-10-20 RX ORDER — OMEPRAZOLE 40 MG/1
40 CAPSULE, DELAYED RELEASE ORAL DAILY
COMMUNITY

## 2022-10-20 RX ORDER — METOPROLOL SUCCINATE 25 MG/1
12.5 TABLET, EXTENDED RELEASE ORAL DAILY
COMMUNITY

## 2022-10-20 RX ORDER — GABAPENTIN 300 MG/1
300 CAPSULE ORAL 3 TIMES DAILY
COMMUNITY

## 2022-10-20 RX ORDER — AMITRIPTYLINE HYDROCHLORIDE 150 MG/1
150 TABLET ORAL NIGHTLY
COMMUNITY

## 2022-10-31 ENCOUNTER — CLINICAL ABSTRACT (OUTPATIENT)
Dept: OBGYN | Age: 42
End: 2022-10-31

## 2022-11-01 ENCOUNTER — APPOINTMENT (OUTPATIENT)
Dept: OBGYN | Age: 42
End: 2022-11-01

## 2022-11-20 PROBLEM — R00.0 TACHYCARDIA: Status: ACTIVE | Noted: 2022-11-20

## 2022-11-20 PROBLEM — Z98.891 HISTORY OF CESAREAN DELIVERY: Status: ACTIVE | Noted: 2022-11-20

## 2022-11-20 PROBLEM — G43.909 MIGRAINE: Status: ACTIVE | Noted: 2022-11-20

## 2022-11-20 PROBLEM — N92.0 MENORRHAGIA: Status: ACTIVE | Noted: 2022-11-20

## 2022-11-20 PROBLEM — D50.9 IRON DEFICIENCY ANEMIA: Status: ACTIVE | Noted: 2022-11-20

## 2022-11-20 PROBLEM — D25.9 UTERINE LEIOMYOMA: Status: ACTIVE | Noted: 2022-11-20

## 2022-11-20 PROBLEM — J45.909 ASTHMA: Status: ACTIVE | Noted: 2022-11-20

## 2022-11-20 PROBLEM — Z98.890 HISTORY OF FEMALE STERILIZATION: Status: ACTIVE | Noted: 2022-11-20

## 2022-11-21 ENCOUNTER — APPOINTMENT (OUTPATIENT)
Dept: OBGYN | Age: 42
End: 2022-11-21

## 2022-12-13 DIAGNOSIS — G25.2 ACTION TREMOR: ICD-10-CM

## 2022-12-13 DIAGNOSIS — G43.009 MIGRAINE WITHOUT AURA AND WITHOUT STATUS MIGRAINOSUS, NOT INTRACTABLE: ICD-10-CM

## 2022-12-13 RX ORDER — AMITRIPTYLINE HYDROCHLORIDE 150 MG/1
TABLET, FILM COATED ORAL
Qty: 90 TABLET | Refills: 0 | Status: SHIPPED | OUTPATIENT
Start: 2022-12-13

## 2022-12-13 NOTE — TELEPHONE ENCOUNTER
The patient is requesting a refill on: Amitriptyline HCl 150 MG - Take 1 tablet (150 mg total) by mouth nightly    Last OV: 2/11/22  Next OV: 2/16/23  Last refilled: 8/22/22 #90 with no refills

## 2023-02-08 VITALS — HEIGHT: 61 IN

## 2023-02-09 ENCOUNTER — TELEMEDICINE (OUTPATIENT)
Dept: INTERNAL MEDICINE CLINIC | Facility: CLINIC | Age: 43
End: 2023-02-09

## 2023-02-09 DIAGNOSIS — R39.9 UTI SYMPTOMS: ICD-10-CM

## 2023-02-09 DIAGNOSIS — N30.00 ACUTE CYSTITIS WITHOUT HEMATURIA: Primary | ICD-10-CM

## 2023-02-09 PROCEDURE — 99213 OFFICE O/P EST LOW 20 MIN: CPT | Performed by: NURSE PRACTITIONER

## 2023-02-09 RX ORDER — CIPROFLOXACIN 500 MG/1
500 TABLET, FILM COATED ORAL 2 TIMES DAILY
Qty: 10 TABLET | Refills: 0 | Status: SHIPPED | OUTPATIENT
Start: 2023-02-09 | End: 2023-02-14

## 2023-02-09 NOTE — ASSESSMENT & PLAN NOTE
UTI symptoms include -Urgency, frequency,burning and painful urination. Plan  Hydrate with water  Urinalysis with reflex culture  May use AZO over the counter medication for urinary pain relief. Two tabs three times a day as needed. May discolor urine to a bright orange. Ciprofloxacin 500 mg po Bid x five days.

## 2023-02-13 ENCOUNTER — OFFICE VISIT (OUTPATIENT)
Dept: OBGYN | Age: 43
End: 2023-02-13

## 2023-02-13 VITALS
TEMPERATURE: 98.6 F | HEIGHT: 61 IN | DIASTOLIC BLOOD PRESSURE: 82 MMHG | WEIGHT: 205.03 LBS | BODY MASS INDEX: 38.71 KG/M2 | HEART RATE: 116 BPM | SYSTOLIC BLOOD PRESSURE: 142 MMHG

## 2023-02-13 DIAGNOSIS — N92.0 MENORRHAGIA WITH REGULAR CYCLE: ICD-10-CM

## 2023-02-13 DIAGNOSIS — Z12.31 SCREENING MAMMOGRAM FOR BREAST CANCER: ICD-10-CM

## 2023-02-13 DIAGNOSIS — Z12.4 SCREENING FOR MALIGNANT NEOPLASM OF THE CERVIX: ICD-10-CM

## 2023-02-13 DIAGNOSIS — Z01.419 ENCOUNTER FOR GYNECOLOGICAL EXAMINATION (GENERAL) (ROUTINE) WITHOUT ABNORMAL FINDINGS: Primary | ICD-10-CM

## 2023-02-13 PROCEDURE — 87624 HPV HI-RISK TYP POOLED RSLT: CPT | Performed by: INTERNAL MEDICINE

## 2023-02-13 PROCEDURE — 99396 PREV VISIT EST AGE 40-64: CPT | Performed by: OBSTETRICS & GYNECOLOGY

## 2023-02-13 PROCEDURE — 88175 CYTOPATH C/V AUTO FLUID REDO: CPT | Performed by: INTERNAL MEDICINE

## 2023-02-13 RX ORDER — CIPROFLOXACIN 500 MG/1
TABLET, FILM COATED ORAL
COMMUNITY
Start: 2023-02-09

## 2023-02-13 ASSESSMENT — PATIENT HEALTH QUESTIONNAIRE - PHQ9
SUM OF ALL RESPONSES TO PHQ9 QUESTIONS 1 AND 2: 0
CLINICAL INTERPRETATION OF PHQ2 SCORE: NO FURTHER SCREENING NEEDED
SUM OF ALL RESPONSES TO PHQ9 QUESTIONS 1 AND 2: 0
2. FEELING DOWN, DEPRESSED OR HOPELESS: NOT AT ALL
1. LITTLE INTEREST OR PLEASURE IN DOING THINGS: NOT AT ALL

## 2023-02-16 LAB
CASE RPRT: NORMAL
CLINICAL INFO: NORMAL
CYTOLOGY CVX/VAG STUDY: NORMAL
HPV16+18+45 E6+E7MRNA CVX NAA+PROBE: NEGATIVE
Lab: NORMAL
PAP EDUCATIONAL NOTE: NORMAL
SPECIMEN ADEQUACY: NORMAL

## 2023-02-20 ENCOUNTER — TELEMEDICINE (OUTPATIENT)
Dept: INTERNAL MEDICINE CLINIC | Facility: CLINIC | Age: 43
End: 2023-02-20

## 2023-02-20 DIAGNOSIS — R13.14 PHARYNGOESOPHAGEAL DYSPHAGIA: Primary | ICD-10-CM

## 2023-02-20 PROCEDURE — 99213 OFFICE O/P EST LOW 20 MIN: CPT | Performed by: NURSE PRACTITIONER

## 2023-03-02 ENCOUNTER — NURSE ONLY (OUTPATIENT)
Dept: OBGYN | Age: 43
End: 2023-03-02

## 2023-03-02 ENCOUNTER — TELEPHONE (OUTPATIENT)
Dept: OBGYN | Age: 43
End: 2023-03-02

## 2023-03-02 DIAGNOSIS — N92.4 EXCESSIVE BLEEDING IN PREMENOPAUSAL PERIOD: Primary | ICD-10-CM

## 2023-03-02 DIAGNOSIS — R58 EXCESSIVE BLEEDING: Primary | ICD-10-CM

## 2023-03-02 PROCEDURE — 96372 THER/PROPH/DIAG INJ SC/IM: CPT | Performed by: OBSTETRICS & GYNECOLOGY

## 2023-03-02 RX ORDER — MEDROXYPROGESTERONE ACETATE 150 MG/ML
150 INJECTION, SUSPENSION INTRAMUSCULAR ONCE
Status: COMPLETED | OUTPATIENT
Start: 2023-03-02 | End: 2023-03-02

## 2023-03-02 RX ADMIN — MEDROXYPROGESTERONE ACETATE 150 MG: 150 INJECTION, SUSPENSION INTRAMUSCULAR at 15:45

## 2023-03-12 DIAGNOSIS — G25.2 ACTION TREMOR: ICD-10-CM

## 2023-03-12 DIAGNOSIS — G43.009 MIGRAINE WITHOUT AURA AND WITHOUT STATUS MIGRAINOSUS, NOT INTRACTABLE: ICD-10-CM

## 2023-03-13 RX ORDER — AMITRIPTYLINE HYDROCHLORIDE 150 MG/1
TABLET, FILM COATED ORAL
Qty: 90 TABLET | Refills: 0 | OUTPATIENT
Start: 2023-03-13

## 2023-03-15 ENCOUNTER — TELEMEDICINE (OUTPATIENT)
Dept: NEUROLOGY | Facility: CLINIC | Age: 43
End: 2023-03-15
Payer: COMMERCIAL

## 2023-03-15 DIAGNOSIS — G43.009 MIGRAINE WITHOUT AURA AND WITHOUT STATUS MIGRAINOSUS, NOT INTRACTABLE: ICD-10-CM

## 2023-03-15 DIAGNOSIS — G47.00 INSOMNIA, UNSPECIFIED TYPE: Primary | ICD-10-CM

## 2023-03-15 DIAGNOSIS — G25.2 ACTION TREMOR: ICD-10-CM

## 2023-03-15 RX ORDER — ERGOCALCIFEROL 1.25 MG/1
1.25 CAPSULE ORAL
COMMUNITY

## 2023-03-15 RX ORDER — CIPROFLOXACIN 500 MG/1
TABLET, FILM COATED ORAL
COMMUNITY
Start: 2023-02-09

## 2023-03-15 RX ORDER — AMITRIPTYLINE HYDROCHLORIDE 150 MG/1
150 TABLET, FILM COATED ORAL NIGHTLY
Qty: 90 TABLET | Refills: 3 | Status: SHIPPED | OUTPATIENT
Start: 2023-03-15 | End: 2024-03-14

## 2023-03-15 RX ORDER — GABAPENTIN 300 MG/1
300 CAPSULE ORAL
COMMUNITY

## 2023-03-15 RX ORDER — OMEPRAZOLE 40 MG/1
40 CAPSULE, DELAYED RELEASE ORAL DAILY
COMMUNITY

## 2023-04-28 ENCOUNTER — PATIENT MESSAGE (OUTPATIENT)
Dept: ENDOCRINOLOGY CLINIC | Facility: CLINIC | Age: 43
End: 2023-04-28

## 2023-04-28 ENCOUNTER — HOSPITAL ENCOUNTER (OUTPATIENT)
Dept: GENERAL RADIOLOGY | Facility: HOSPITAL | Age: 43
Discharge: HOME | End: 2023-04-28
Attending: SPECIALIST
Payer: COMMERCIAL

## 2023-04-28 ENCOUNTER — OFFICE VISIT (OUTPATIENT)
Dept: ENDOCRINOLOGY CLINIC | Facility: CLINIC | Age: 43
End: 2023-04-28

## 2023-04-28 VITALS
SYSTOLIC BLOOD PRESSURE: 140 MMHG | WEIGHT: 203 LBS | HEART RATE: 115 BPM | DIASTOLIC BLOOD PRESSURE: 83 MMHG | BODY MASS INDEX: 38.33 KG/M2 | HEIGHT: 61 IN

## 2023-04-28 DIAGNOSIS — K21.9 GASTROESOPHAGEAL REFLUX DISEASE, UNSPECIFIED WHETHER ESOPHAGITIS PRESENT: ICD-10-CM

## 2023-04-28 DIAGNOSIS — R13.10 DYSPHAGIA, UNSPECIFIED TYPE: ICD-10-CM

## 2023-04-28 DIAGNOSIS — E04.2 MULTINODULAR GOITER: Primary | ICD-10-CM

## 2023-04-28 DIAGNOSIS — R22.1 NECK FULLNESS: ICD-10-CM

## 2023-04-28 PROCEDURE — 3077F SYST BP >= 140 MM HG: CPT | Performed by: INTERNAL MEDICINE

## 2023-04-28 PROCEDURE — 3008F BODY MASS INDEX DOCD: CPT | Performed by: INTERNAL MEDICINE

## 2023-04-28 PROCEDURE — 99213 OFFICE O/P EST LOW 20 MIN: CPT | Performed by: INTERNAL MEDICINE

## 2023-04-28 PROCEDURE — 3079F DIAST BP 80-89 MM HG: CPT | Performed by: INTERNAL MEDICINE

## 2023-04-28 PROCEDURE — 74246 X-RAY XM UPR GI TRC 2CNTRST: CPT | Performed by: SPECIALIST

## 2023-04-28 NOTE — TELEPHONE ENCOUNTER
From: Judie Baca  To: Nargis Mcok MD  Sent: 4/28/2023 11:08 AM CDT  Subject: Test Result    Hello,   The test results from the Esophogram are in my chart.     Thanks

## 2023-05-19 ENCOUNTER — NURSE ONLY (OUTPATIENT)
Dept: OBGYN | Age: 43
End: 2023-05-19

## 2023-05-19 VITALS
DIASTOLIC BLOOD PRESSURE: 96 MMHG | OXYGEN SATURATION: 96 % | TEMPERATURE: 99.1 F | HEART RATE: 119 BPM | SYSTOLIC BLOOD PRESSURE: 146 MMHG

## 2023-05-19 DIAGNOSIS — N92.1 MENORRHAGIA WITH IRREGULAR CYCLE: Primary | ICD-10-CM

## 2023-05-19 PROCEDURE — 96372 THER/PROPH/DIAG INJ SC/IM: CPT | Performed by: OBSTETRICS & GYNECOLOGY

## 2023-05-19 RX ORDER — MEDROXYPROGESTERONE ACETATE 150 MG/ML
150 INJECTION, SUSPENSION INTRAMUSCULAR ONCE
Status: COMPLETED | OUTPATIENT
Start: 2023-05-19 | End: 2023-05-19

## 2023-05-19 RX ADMIN — MEDROXYPROGESTERONE ACETATE 150 MG: 150 INJECTION, SUSPENSION INTRAMUSCULAR at 09:20

## 2023-05-19 ASSESSMENT — PATIENT HEALTH QUESTIONNAIRE - PHQ9
CLINICAL INTERPRETATION OF PHQ2 SCORE: NO FURTHER SCREENING NEEDED
1. LITTLE INTEREST OR PLEASURE IN DOING THINGS: NOT AT ALL
SUM OF ALL RESPONSES TO PHQ9 QUESTIONS 1 AND 2: 0
2. FEELING DOWN, DEPRESSED OR HOPELESS: NOT AT ALL
SUM OF ALL RESPONSES TO PHQ9 QUESTIONS 1 AND 2: 0

## 2023-05-28 ENCOUNTER — HOSPITAL ENCOUNTER (OUTPATIENT)
Dept: ULTRASOUND IMAGING | Facility: HOSPITAL | Age: 43
Discharge: HOME | End: 2023-05-28
Attending: INTERNAL MEDICINE
Payer: COMMERCIAL

## 2023-05-28 ENCOUNTER — HOSPITAL ENCOUNTER (OUTPATIENT)
Dept: ULTRASOUND IMAGING | Facility: HOSPITAL | Age: 43
End: 2023-05-28
Attending: INTERNAL MEDICINE
Payer: COMMERCIAL

## 2023-05-28 DIAGNOSIS — R22.1 NECK FULLNESS: ICD-10-CM

## 2023-05-28 DIAGNOSIS — R13.10 DYSPHAGIA, UNSPECIFIED TYPE: ICD-10-CM

## 2023-05-28 DIAGNOSIS — E04.2 MULTINODULAR GOITER: ICD-10-CM

## 2023-05-28 PROCEDURE — 76536 US EXAM OF HEAD AND NECK: CPT | Performed by: INTERNAL MEDICINE

## 2023-06-19 ENCOUNTER — TELEMEDICINE (OUTPATIENT)
Dept: INTERNAL MEDICINE CLINIC | Facility: CLINIC | Age: 43
End: 2023-06-19

## 2023-06-19 DIAGNOSIS — J45.20 MILD INTERMITTENT ASTHMA WITHOUT COMPLICATION: ICD-10-CM

## 2023-06-19 DIAGNOSIS — M79.10 MYALGIA: ICD-10-CM

## 2023-06-19 DIAGNOSIS — G56.02 CARPAL TUNNEL SYNDROME OF LEFT WRIST: ICD-10-CM

## 2023-06-19 DIAGNOSIS — Z00.00 ANNUAL PHYSICAL EXAM: Primary | ICD-10-CM

## 2023-06-19 DIAGNOSIS — E55.9 VITAMIN D DEFICIENCY: ICD-10-CM

## 2023-06-19 NOTE — ASSESSMENT & PLAN NOTE
Patient complaining of achy joint pain.     Plan  Annual lab  Sed rate  Follow up for annual physical

## 2023-06-24 ENCOUNTER — OFFICE VISIT (OUTPATIENT)
Dept: OTOLARYNGOLOGY | Facility: CLINIC | Age: 43
End: 2023-06-24

## 2023-06-24 VITALS — WEIGHT: 115 LBS | HEIGHT: 61 IN | BODY MASS INDEX: 21.71 KG/M2

## 2023-06-24 DIAGNOSIS — J34.3 NASAL TURBINATE HYPERTROPHY: ICD-10-CM

## 2023-06-24 DIAGNOSIS — E04.9 GOITER, NODULAR: ICD-10-CM

## 2023-06-24 DIAGNOSIS — R13.12 OROPHARYNGEAL DYSPHAGIA: Primary | ICD-10-CM

## 2023-06-24 DIAGNOSIS — K21.9 GASTROESOPHAGEAL REFLUX DISEASE, UNSPECIFIED WHETHER ESOPHAGITIS PRESENT: ICD-10-CM

## 2023-06-24 RX ORDER — FLUTICASONE PROPIONATE 50 MCG
2 SPRAY, SUSPENSION (ML) NASAL DAILY
Qty: 16 G | Refills: 3 | Status: SHIPPED | OUTPATIENT
Start: 2023-06-24

## 2023-06-24 NOTE — PATIENT INSTRUCTIONS
Continue antireflux diet and omeprazole for your reflux disease. I added Flonase nasal spray 2 sprays to each nostril once in the morning for your nasal congestion. I also ordered a video swallow to better assess your swallowing. This will help us determine whether a thyroidectomy would be reasonable or not.

## 2023-08-04 ENCOUNTER — NURSE ONLY (OUTPATIENT)
Dept: OBGYN | Age: 43
End: 2023-08-04

## 2023-08-04 VITALS — SYSTOLIC BLOOD PRESSURE: 137 MMHG | DIASTOLIC BLOOD PRESSURE: 88 MMHG

## 2023-08-04 DIAGNOSIS — N92.0 MENORRHAGIA WITH REGULAR CYCLE: ICD-10-CM

## 2023-08-04 PROCEDURE — 96372 THER/PROPH/DIAG INJ SC/IM: CPT | Performed by: OBSTETRICS & GYNECOLOGY

## 2023-08-04 RX ORDER — MEDROXYPROGESTERONE ACETATE 150 MG/ML
150 INJECTION, SUSPENSION INTRAMUSCULAR ONCE
Status: COMPLETED | OUTPATIENT
Start: 2023-08-04 | End: 2023-08-04

## 2023-08-04 RX ADMIN — MEDROXYPROGESTERONE ACETATE 150 MG: 150 INJECTION, SUSPENSION INTRAMUSCULAR at 10:50

## 2023-08-11 ENCOUNTER — HOSPITAL ENCOUNTER (OUTPATIENT)
Dept: GENERAL RADIOLOGY | Facility: HOSPITAL | Age: 43
Discharge: HOME | End: 2023-08-11
Attending: SPECIALIST
Payer: COMMERCIAL

## 2023-08-11 DIAGNOSIS — R13.12 OROPHARYNGEAL DYSPHAGIA: ICD-10-CM

## 2023-08-11 PROCEDURE — 92611 MOTION FLUOROSCOPY/SWALLOW: CPT

## 2023-08-11 PROCEDURE — 74230 X-RAY XM SWLNG FUNCJ C+: CPT | Performed by: SPECIALIST

## 2023-08-11 NOTE — PROGRESS NOTES
Patient is aware. Will follow exercises. If not helpful swallowing therapy. Reviewed videoswallow results with the patient.

## 2023-08-11 NOTE — PATIENT INSTRUCTIONS
DYSPHAGIA EXERCISES  Complete all exercises 20 times. Complete the entire program 3 times per day       Effortful Swallow or [employer] [facility] as hard as you can like you are swallowing something very large feeling all the muscles in your neck squeeze tightly. tongue exercises  Stick out your tongue as far as you can without touching your teeth or lips. Retract your tongue back into your mouth as far back as you can. Open your mouth and move your tongue side to side without touching your lower lip and being sure to touch each corner of your mouth. Concentrate on smooth, controlled movements. Open your mouth and bring your tongue up behind your top teeth, then down. Open your mouth and move your tongue around in a [place] all around the outside of your lips in smooth, controlled movements. Make sure you touch every part of your lips.

## 2023-08-11 NOTE — PROGRESS NOTES
ADULT VIDEOFLUOROSCOPIC SWALLOWING STUDY       ADULT VIDEOFLUOROSCOPIC SWALLOWING STUDY:   Referring Physician: Lyubov Giron      Radiologist: Dr. Alize Ding  Diagnosis: dysphagia    Date of Service: 8/11/2023     PATIENT SUMMARY   Chief Complaint: Pt c/o gagging and food coming back up into her mouth from her throat several times a week for the past 6-9 months. She coughs and chokes with both food and liquid and feels worn out and has shortness of breath during/after the episodes. Symptoms are worse with carbonated liquids and bread products. She denies weight loss and odynophagia, but notes discomfort when it occurs. Pt/ENT are considering thyroid surgery and patient has had several recent appointments. Esophagram showed small hiatal hernia and no penetration or aspiration. US showed thyroid nodules. See below. Current Diet: Soft, moist foods and liquids       Problem List  Active Problems:  Active Problems:    * No active hospital problems. *      Past Medical History  Past Medical History:   Diagnosis Date    Anesthesia complication     increase heart rate after anesthesia    Arrhythmia     Asthma     Back problem     Disorder of thyroid     thyroid nodules    Esophageal reflux     Essential hypertension     High blood pressure     Migraines     Osteoarthritis     Palpitations     Visual impairment     wears glasses        Imaging results: no recent CXR    4/28/23 esophagram:  1. No penetration or aspiration. 2. Small hiatal hernia and minimal reflux demonstrated during Valsalva. 5/28/23 US Head and neck:  Bilateral thyroid nodules compatible with nodular goiter. Stable appearance of nodules throughout the left lobe. Previously demonstrated right lobe nodule is not clearly redemonstrated on this exam.  Largest nodule is 17 x 11 x 17 mm and has TIRADS 4   criteria. FNA of this nodule was performed 9/12/18, and demonstrated demonstrating benign follicular nodule.      ASSESSMENT   DYSPHAGIA ASSESSMENT  Test completed in conjunction with Radiologist.   Food/Liquid Types Presented: puree, solid, and thin liquids. Study Position and View:  Patient was seated upright and viewed laterally. A-P was not viewed as patient had recent esophagram.    Pain Assessment: The patient reports pain at a level of 0/10. Oral phase:  Reduced bolus control and containment resulted in partial premature spillage of all consistencies into the pharynx prior to the swallow. Puree consistency was swallowed in piecemeal fashion with the second swallow clearing all residual.  No oral residue remained with solid or liquid consistencies. Pharyngeal phase:  The pharyngeal response typically triggered at the tongue base, but after part of bolus entered the pharynx. Thin liquids partially spilled to the valleculae and pyriform sinuses. Puree and solids partially fell prematurely to the valleculae. No penetration or aspiration was observed across consistencies as epiglottic inversion and laryngeal closure were adequate. Good base of tongue retraction and hyolaryngeal excursion resulted in no pharyngeal retention. Esophageal phase:   Adequate flow of bolus through upper esophagus     Penetration Aspiration Scale: 1/8. Material does not enter the airway. Overall Impression: Essentially normal/functional swallow with no laryngeal penetration or aspiration, however, pt is at minimal risk due to partial premature spillage of all consistencies prior to the swallow. No therapy is recommended at this time. Pt should complete lingual exercises on her own. FCM category and level: Swallowing, 7  PLAN   Potential: Good    Diet Recommendations:  Solids: Regular  Liquids: Thin    Recommended compensatory strategies:   Sit upright    Medication Administration:  No restrictions    Further Follow-up:  Pt was provided with lingual control exercises and effortful swallow to do independently.   If symptoms persist, patient may benefit from short term therapy. EDUCATION/INSTRUCTION  Reviewed results and recommendations with patient. Written instructions were provided. Agreement/Understanding verbalized and all questions answered to their apparent satisfaction. INTERDISCIPLINARY COMMUNICATION  Reviewed results with Radiologist; agreement verbalized. Thank you for your referral.  If you have any questions, please contact me at [phone].     Loan Lopez MA/CCC-SLP  Speech Language Pathologist  [facility]  [phone]    Electronically signed by therapist: DEBBY Barba  Physician's certification required: No

## 2023-10-19 ENCOUNTER — OFFICE VISIT (OUTPATIENT)
Dept: INTERNAL MEDICINE CLINIC | Facility: CLINIC | Age: 43
End: 2023-10-19
Payer: COMMERCIAL

## 2023-10-19 VITALS
OXYGEN SATURATION: 98 % | HEART RATE: 118 BPM | DIASTOLIC BLOOD PRESSURE: 92 MMHG | HEIGHT: 61 IN | WEIGHT: 203 LBS | BODY MASS INDEX: 38.33 KG/M2 | SYSTOLIC BLOOD PRESSURE: 152 MMHG

## 2023-10-19 DIAGNOSIS — L83 ACANTHOSIS: ICD-10-CM

## 2023-10-19 DIAGNOSIS — R13.14 PHARYNGOESOPHAGEAL DYSPHAGIA: ICD-10-CM

## 2023-10-19 DIAGNOSIS — M79.10 MYALGIA: ICD-10-CM

## 2023-10-19 DIAGNOSIS — R25.1 TREMOR OF BOTH HANDS: Primary | ICD-10-CM

## 2023-10-19 DIAGNOSIS — M51.36 BULGE OF LUMBAR DISC WITHOUT MYELOPATHY: ICD-10-CM

## 2023-10-19 PROCEDURE — 3008F BODY MASS INDEX DOCD: CPT | Performed by: INTERNAL MEDICINE

## 2023-10-19 PROCEDURE — 90686 IIV4 VACC NO PRSV 0.5 ML IM: CPT | Performed by: INTERNAL MEDICINE

## 2023-10-19 PROCEDURE — 99215 OFFICE O/P EST HI 40 MIN: CPT | Performed by: INTERNAL MEDICINE

## 2023-10-19 PROCEDURE — 3077F SYST BP >= 140 MM HG: CPT | Performed by: INTERNAL MEDICINE

## 2023-10-19 PROCEDURE — 90471 IMMUNIZATION ADMIN: CPT | Performed by: INTERNAL MEDICINE

## 2023-10-19 PROCEDURE — 3080F DIAST BP >= 90 MM HG: CPT | Performed by: INTERNAL MEDICINE

## 2023-10-28 ENCOUNTER — LAB ENCOUNTER (OUTPATIENT)
Dept: LAB | Age: 43
End: 2023-10-28
Attending: NURSE PRACTITIONER
Payer: COMMERCIAL

## 2023-10-28 DIAGNOSIS — M79.10 MYALGIA: ICD-10-CM

## 2023-10-28 LAB
ALBUMIN SERPL-MCNC: 3.3 G/DL (ref 3.4–5)
ALBUMIN/GLOB SERPL: 0.7 {RATIO} (ref 1–2)
ALP LIVER SERPL-CCNC: 97 U/L
ALT SERPL-CCNC: 17 U/L
ANION GAP SERPL CALC-SCNC: 9 MMOL/L (ref 0–18)
AST SERPL-CCNC: 12 U/L (ref 15–37)
BILIRUB SERPL-MCNC: 0.4 MG/DL (ref 0.1–2)
BUN BLD-MCNC: 7 MG/DL (ref 7–18)
BUN/CREAT SERPL: 7.4 (ref 10–20)
CALCIUM BLD-MCNC: 9 MG/DL (ref 8.5–10.1)
CHLORIDE SERPL-SCNC: 106 MMOL/L (ref 98–112)
CHOLEST SERPL-MCNC: 177 MG/DL (ref ?–200)
CO2 SERPL-SCNC: 24 MMOL/L (ref 21–32)
CREAT BLD-MCNC: 0.94 MG/DL
EGFRCR SERPLBLD CKD-EPI 2021: 77 ML/MIN/1.73M2 (ref 60–?)
ERYTHROCYTE [SEDIMENTATION RATE] IN BLOOD: 119 MM/HR
FASTING PATIENT LIPID ANSWER: YES
FASTING STATUS PATIENT QL REPORTED: YES
GLOBULIN PLAS-MCNC: 4.8 G/DL (ref 2.8–4.4)
GLUCOSE BLD-MCNC: 105 MG/DL (ref 70–99)
HDLC SERPL-MCNC: 48 MG/DL (ref 40–59)
LDLC SERPL CALC-MCNC: 114 MG/DL (ref ?–100)
NONHDLC SERPL-MCNC: 129 MG/DL (ref ?–130)
OSMOLALITY SERPL CALC.SUM OF ELEC: 286 MOSM/KG (ref 275–295)
POTASSIUM SERPL-SCNC: 3.8 MMOL/L (ref 3.5–5.1)
PROT SERPL-MCNC: 8.1 G/DL (ref 6.4–8.2)
SODIUM SERPL-SCNC: 139 MMOL/L (ref 136–145)
TRIGL SERPL-MCNC: 82 MG/DL (ref 30–149)
TSI SER-ACNC: 0.56 MIU/ML (ref 0.36–3.74)
VIT B12 SERPL-MCNC: 643 PG/ML (ref 193–986)
VIT D+METAB SERPL-MCNC: 11.5 NG/ML (ref 30–100)
VLDLC SERPL CALC-MCNC: 14 MG/DL (ref 0–30)

## 2023-10-28 PROCEDURE — 36415 COLL VENOUS BLD VENIPUNCTURE: CPT

## 2023-10-28 PROCEDURE — 85027 COMPLETE CBC AUTOMATED: CPT

## 2023-10-28 PROCEDURE — 82306 VITAMIN D 25 HYDROXY: CPT

## 2023-10-28 PROCEDURE — 85060 BLOOD SMEAR INTERPRETATION: CPT

## 2023-10-28 PROCEDURE — 80053 COMPREHEN METABOLIC PANEL: CPT

## 2023-10-28 PROCEDURE — 82607 VITAMIN B-12: CPT

## 2023-10-28 PROCEDURE — 84443 ASSAY THYROID STIM HORMONE: CPT

## 2023-10-28 PROCEDURE — 80061 LIPID PANEL: CPT

## 2023-10-28 PROCEDURE — 85652 RBC SED RATE AUTOMATED: CPT | Performed by: NURSE PRACTITIONER

## 2023-10-29 ENCOUNTER — TELEPHONE (OUTPATIENT)
Dept: INTERNAL MEDICINE CLINIC | Facility: CLINIC | Age: 43
End: 2023-10-29

## 2023-10-30 LAB
DEPRECATED RDW RBC AUTO: 41.1 FL (ref 35.1–46.3)
ERYTHROCYTE [DISTWIDTH] IN BLOOD BY AUTOMATED COUNT: 17.6 % (ref 11–15)
HCT VFR BLD AUTO: 35.1 %
HGB BLD-MCNC: 10.4 G/DL
MCH RBC QN AUTO: 19.6 PG (ref 26–34)
MCHC RBC AUTO-ENTMCNC: 29.6 G/DL (ref 31–37)
MCV RBC AUTO: 66.2 FL
PLATELET # BLD AUTO: 604 10(3)UL (ref 150–450)
RBC # BLD AUTO: 5.3 X10(6)UL
WBC # BLD AUTO: 9.4 X10(3) UL (ref 4–11)

## 2023-11-03 ENCOUNTER — LAB ENCOUNTER (OUTPATIENT)
Dept: LAB | Facility: HOSPITAL | Age: 43
End: 2023-11-03
Attending: INTERNAL MEDICINE
Payer: COMMERCIAL

## 2023-11-03 ENCOUNTER — OFFICE VISIT (OUTPATIENT)
Dept: INTERNAL MEDICINE CLINIC | Facility: CLINIC | Age: 43
End: 2023-11-03
Payer: COMMERCIAL

## 2023-11-03 VITALS
SYSTOLIC BLOOD PRESSURE: 138 MMHG | DIASTOLIC BLOOD PRESSURE: 70 MMHG | OXYGEN SATURATION: 98 % | TEMPERATURE: 97 F | BODY MASS INDEX: 39 KG/M2 | WEIGHT: 205 LBS | HEART RATE: 128 BPM

## 2023-11-03 DIAGNOSIS — D50.9 MICROCYTIC ANEMIA: ICD-10-CM

## 2023-11-03 DIAGNOSIS — I10 ESSENTIAL HYPERTENSION: Primary | ICD-10-CM

## 2023-11-03 DIAGNOSIS — N92.1 MENORRHAGIA WITH IRREGULAR CYCLE: ICD-10-CM

## 2023-11-03 DIAGNOSIS — M13.0 POLYARTHROPATHY: Primary | ICD-10-CM

## 2023-11-03 DIAGNOSIS — R70.0 ESR RAISED: ICD-10-CM

## 2023-11-03 DIAGNOSIS — M13.0 POLYARTICULAR ARTHRITIS: ICD-10-CM

## 2023-11-03 LAB
CRP SERPL-MCNC: 1.9 MG/DL (ref ?–1)
DEPRECATED HBV CORE AB SER IA-ACNC: 7.2 NG/ML
ERYTHROCYTE [SEDIMENTATION RATE] IN BLOOD: 48 MM/HR
IRON SATN MFR SERPL: 4 %
IRON SERPL-MCNC: 18 UG/DL
RHEUMATOID FACT SERPL-ACNC: 20 IU/ML (ref ?–14)
TIBC SERPL-MCNC: 454 UG/DL (ref 250–425)
TRANSFERRIN SERPL-MCNC: 305 MG/DL (ref 250–380)
URATE SERPL-MCNC: 5.1 MG/DL

## 2023-11-03 PROCEDURE — 85652 RBC SED RATE AUTOMATED: CPT | Performed by: INTERNAL MEDICINE

## 2023-11-03 PROCEDURE — 3078F DIAST BP <80 MM HG: CPT | Performed by: INTERNAL MEDICINE

## 2023-11-03 PROCEDURE — 3075F SYST BP GE 130 - 139MM HG: CPT | Performed by: INTERNAL MEDICINE

## 2023-11-03 PROCEDURE — 86200 CCP ANTIBODY: CPT | Performed by: INTERNAL MEDICINE

## 2023-11-03 PROCEDURE — 86038 ANTINUCLEAR ANTIBODIES: CPT | Performed by: INTERNAL MEDICINE

## 2023-11-03 PROCEDURE — 86140 C-REACTIVE PROTEIN: CPT | Performed by: INTERNAL MEDICINE

## 2023-11-03 PROCEDURE — 84550 ASSAY OF BLOOD/URIC ACID: CPT | Performed by: INTERNAL MEDICINE

## 2023-11-03 PROCEDURE — 99214 OFFICE O/P EST MOD 30 MIN: CPT | Performed by: INTERNAL MEDICINE

## 2023-11-03 PROCEDURE — 84466 ASSAY OF TRANSFERRIN: CPT | Performed by: INTERNAL MEDICINE

## 2023-11-03 PROCEDURE — 83540 ASSAY OF IRON: CPT | Performed by: INTERNAL MEDICINE

## 2023-11-03 PROCEDURE — 82728 ASSAY OF FERRITIN: CPT | Performed by: INTERNAL MEDICINE

## 2023-11-03 PROCEDURE — 86431 RHEUMATOID FACTOR QUANT: CPT | Performed by: INTERNAL MEDICINE

## 2023-11-03 PROCEDURE — 86225 DNA ANTIBODY NATIVE: CPT | Performed by: INTERNAL MEDICINE

## 2023-11-06 ENCOUNTER — NURSE ONLY (OUTPATIENT)
Dept: OBGYN | Age: 43
End: 2023-11-06

## 2023-11-06 DIAGNOSIS — N92.0 MENORRHAGIA WITH REGULAR CYCLE: Primary | ICD-10-CM

## 2023-11-06 DIAGNOSIS — Z32.02 NEGATIVE PREGNANCY TEST: ICD-10-CM

## 2023-11-06 LAB
B-HCG UR QL: NEGATIVE
CCP IGG SERPL-ACNC: 2.9 U/ML (ref 0–6.9)
DSDNA IGG SERPL IA-ACNC: 3 IU/ML
ENA AB SER QL IA: 0.3 UG/L
ENA AB SER QL IA: NEGATIVE
INTERNAL PROCEDURAL CONTROLS ACCEPTABLE: YES
TEST LOT EXPIRATION DATE: NORMAL
TEST LOT NUMBER: NORMAL

## 2023-11-06 PROCEDURE — 96372 THER/PROPH/DIAG INJ SC/IM: CPT | Performed by: OBSTETRICS & GYNECOLOGY

## 2023-11-06 PROCEDURE — 81025 URINE PREGNANCY TEST: CPT | Performed by: OBSTETRICS & GYNECOLOGY

## 2023-11-06 RX ORDER — MEDROXYPROGESTERONE ACETATE 150 MG/ML
150 INJECTION, SUSPENSION INTRAMUSCULAR ONCE
Status: COMPLETED | OUTPATIENT
Start: 2023-11-06 | End: 2023-11-06

## 2023-11-06 RX ADMIN — MEDROXYPROGESTERONE ACETATE 150 MG: 150 INJECTION, SUSPENSION INTRAMUSCULAR at 09:15

## 2024-01-05 ENCOUNTER — HOSPITAL ENCOUNTER (OUTPATIENT)
Dept: GENERAL RADIOLOGY | Facility: HOSPITAL | Age: 44
Discharge: HOME | End: 2024-01-05
Attending: INTERNAL MEDICINE
Payer: COMMERCIAL

## 2024-01-05 ENCOUNTER — OFFICE VISIT (OUTPATIENT)
Dept: INTERNAL MEDICINE CLINIC | Facility: CLINIC | Age: 44
End: 2024-01-05
Payer: COMMERCIAL

## 2024-01-05 ENCOUNTER — LAB ENCOUNTER (OUTPATIENT)
Dept: LAB | Facility: HOSPITAL | Age: 44
End: 2024-01-05
Attending: INTERNAL MEDICINE
Payer: COMMERCIAL

## 2024-01-05 VITALS
DIASTOLIC BLOOD PRESSURE: 82 MMHG | TEMPERATURE: 99 F | OXYGEN SATURATION: 98 % | HEART RATE: 127 BPM | WEIGHT: 208 LBS | SYSTOLIC BLOOD PRESSURE: 124 MMHG | BODY MASS INDEX: 39.27 KG/M2 | HEIGHT: 61 IN

## 2024-01-05 DIAGNOSIS — D50.9 MICROCYTIC ANEMIA: ICD-10-CM

## 2024-01-05 DIAGNOSIS — R00.0 TACHYCARDIA: ICD-10-CM

## 2024-01-05 DIAGNOSIS — R06.02 SHORTNESS OF BREATH: ICD-10-CM

## 2024-01-05 DIAGNOSIS — I10 ESSENTIAL HYPERTENSION: ICD-10-CM

## 2024-01-05 DIAGNOSIS — M13.0 POLYARTICULAR ARTHRITIS: ICD-10-CM

## 2024-01-05 DIAGNOSIS — R70.0 ESR RAISED: ICD-10-CM

## 2024-01-05 DIAGNOSIS — M13.0 POLYARTICULAR ARTHRITIS: Primary | ICD-10-CM

## 2024-01-05 LAB
BASOPHILS # BLD AUTO: 0.06 X10(3) UL (ref 0–0.2)
BASOPHILS NFR BLD AUTO: 0.6 %
CRP SERPL-MCNC: 2.4 MG/DL (ref ?–1)
DEPRECATED RDW RBC AUTO: 39.8 FL (ref 35.1–46.3)
EOSINOPHIL # BLD AUTO: 0.1 X10(3) UL (ref 0–0.7)
EOSINOPHIL NFR BLD AUTO: 0.9 %
ERYTHROCYTE [DISTWIDTH] IN BLOOD BY AUTOMATED COUNT: 17.2 % (ref 11–15)
ERYTHROCYTE [SEDIMENTATION RATE] IN BLOOD: 101 MM/HR
HCT VFR BLD AUTO: 35.4 %
HGB BLD-MCNC: 11.4 G/DL
IMM GRANULOCYTES # BLD AUTO: 0.04 X10(3) UL (ref 0–1)
IMM GRANULOCYTES NFR BLD: 0.4 %
LYMPHOCYTES # BLD AUTO: 2.37 X10(3) UL (ref 1–4)
LYMPHOCYTES NFR BLD AUTO: 22 %
MCH RBC QN AUTO: 21.2 PG (ref 26–34)
MCHC RBC AUTO-ENTMCNC: 32.2 G/DL (ref 31–37)
MCV RBC AUTO: 65.7 FL
MONOCYTES # BLD AUTO: 0.92 X10(3) UL (ref 0.1–1)
MONOCYTES NFR BLD AUTO: 8.6 %
NEUTROPHILS # BLD AUTO: 7.27 X10 (3) UL (ref 1.5–7.7)
NEUTROPHILS # BLD AUTO: 7.27 X10(3) UL (ref 1.5–7.7)
NEUTROPHILS NFR BLD AUTO: 67.5 %
PLATELET # BLD AUTO: 566 10(3)UL (ref 150–450)
RBC # BLD AUTO: 5.39 X10(6)UL
TSI SER-ACNC: 0.69 MIU/ML (ref 0.55–4.78)
WBC # BLD AUTO: 10.8 X10(3) UL (ref 4–11)

## 2024-01-05 PROCEDURE — 3074F SYST BP LT 130 MM HG: CPT | Performed by: INTERNAL MEDICINE

## 2024-01-05 PROCEDURE — 3008F BODY MASS INDEX DOCD: CPT | Performed by: INTERNAL MEDICINE

## 2024-01-05 PROCEDURE — 71046 X-RAY EXAM CHEST 2 VIEWS: CPT | Performed by: INTERNAL MEDICINE

## 2024-01-05 PROCEDURE — 84443 ASSAY THYROID STIM HORMONE: CPT

## 2024-01-05 PROCEDURE — 86140 C-REACTIVE PROTEIN: CPT

## 2024-01-05 PROCEDURE — 99215 OFFICE O/P EST HI 40 MIN: CPT | Performed by: INTERNAL MEDICINE

## 2024-01-05 PROCEDURE — 3079F DIAST BP 80-89 MM HG: CPT | Performed by: INTERNAL MEDICINE

## 2024-01-05 PROCEDURE — 36415 COLL VENOUS BLD VENIPUNCTURE: CPT

## 2024-01-05 PROCEDURE — 85652 RBC SED RATE AUTOMATED: CPT

## 2024-01-05 PROCEDURE — 85025 COMPLETE CBC W/AUTO DIFF WBC: CPT

## 2024-01-05 RX ORDER — PREDNISONE 10 MG/1
10 TABLET ORAL DAILY
Qty: 30 TABLET | Refills: 1 | Status: SHIPPED | OUTPATIENT
Start: 2024-01-05 | End: 2024-03-05

## 2024-01-05 NOTE — PROGRESS NOTES
Linda Diamondman female 43 year old         Chief Complaint   Patient presents with    Physical    Arthritis     Joint pain   Fingers  wrist  trista left  knees -  has a lot of  stiffness  not so much swelling in am  -  but is very stiff in am       No fever or chills   No myalgias    Has  tachycardia - for yrs when goes to  doc has had this worked up in the past with a Holter monitor her baseline heart rates above 100.  No significant arrhythmias.  I do not see any echocardiogram in the chart.      Feels like  has to think about  breathing  at times no active wheezing.  Does have history of asthma.  No pleuritic symptoms    Has iron def anemia-last hemoglobin was around 10.-  has not  had  any  GI  bleeding  has appt with GI      Has not  menstrated in months  - on iron when she remembers.    Patient Active Problem List   Diagnosis    Lumbar disc herniation with radiculopathy    Left thyroid nodule    Left flank tenderness    Tachycardia    Patellofemoral pain syndrome of left knee    Patellar tendinitis of left knee    Tenderness of left patella    Spinal stenosis of lumbar region with neurogenic claudication    Asthma    Complicated migraine    Uterine leiomyoma    Low blood sugar    Isolated proteinuria    Encounter for female sterilization procedure    Lumbar trigger point syndrome    Myalgia    Lumbar spondylosis    Lumbar foraminal stenosis    Bulge of lumbar disc without myelopathy    DDD (degenerative disc disease), lumbosacral    Class 2 severe obesity due to excess calories with serious comorbidity and body mass index (BMI) of 37.0 to 37.9 in adult  (HCC)    Migraine without aura and without status migrainosus, not intractable    Pharyngoesophageal dysphagia    Vitamin D deficiency    Other specified anemias    Metrorrhagia    Menorrhagia    UTI symptoms    Carpal tunnel syndrome of left wrist          Current Outpatient Medications on File Prior to Visit   Medication Sig Dispense Refill    fluticasone  propionate 50 MCG/ACT Nasal Suspension 2 sprays by Nasal route daily. 16 g 3    Omeprazole 40 MG Oral Capsule Delayed Release Take 1 capsule (40 mg total) by mouth daily.      Amitriptyline HCl 150 MG Oral Tab Take 1 tablet (150 mg total) by mouth nightly. 90 tablet 3    tranexamic acid 650 MG Oral Tab Take 2 tablets (1,300 mg total) by mouth 3 (three) times daily.      albuterol 108 (90 Base) MCG/ACT Inhalation Aero Soln Inhale 2 puffs into the lungs every 4 (four) hours as needed for Wheezing. 1 each 0    metoprolol succinate ER 25 MG Oral Tablet 24 Hr Take 1 tablet (25 mg total) by mouth daily. 90 tablet 0    Cholecalciferol (VITAMIN D) 1000 units Oral Tab Vitamin D       No current facility-administered medications on file prior to visit.          Vitals:    01/05/24 0953   BP: 124/82   Pulse: (!) 127   Temp: 98.6 °F (37 °C)   VITALSBody mass index is 39.3 kg/m².    Pertinent findings on the physical exam; no resp distress  -  syovitis  left  mcp sl swelling   has tachycardia -  no pallor  lungs  clear       Linda was seen today for physical and arthritis.    Diagnoses and all orders for this visit:    Polyarticular arthritis  -     Sed Rate, Westergren (Automated) [E]; Future  -     C-Reactive Protein [E]; Future    ESR raised    Essential hypertension    Tachycardia  -     TSH W Reflex To Free T4; Future  -     CARD ECHO 2D DOPPLER (CPT=93306); Future    Microcytic anemia  -     CBC With Differential With Platelet; Future    Shortness of breath       She has concerning symptoms that seem consistent with inflammatory arthritis.  Has had elevated sed rate and C-reactive protein in the past elevated rheumatoid factor as well.  Has not been able to see rheumatology yet.    Discussed treatment with steroids until she can get on a DMARD with a definitive diagnosis.    Before starting treatment we will check sed rate and C-reactive protein again.    Will obtain chest x-ray to to look for any lung pathology with her  breathing concern.    For her tachycardia will obtain echocardiogram-will get right and left sided aspects of her heart.  Will also recheck her thyroid and CBC.    Discussed finding her rheumatology.    This was a very complex visit with her arthritis dyspnea tachycardia and anemia problems discussion exam and workup.              This note was prepared using Dragon Medical voice recognition dictation software and as a result, errors may occur. When identified, these errors have been corrected. While every attempt is made to correct errors during dictation, discrepancies may still exist

## 2024-01-15 ENCOUNTER — TELEPHONE (OUTPATIENT)
Dept: OBGYN | Age: 44
End: 2024-01-15

## 2024-01-15 ENCOUNTER — HOSPITAL ENCOUNTER (OUTPATIENT)
Dept: MAMMOGRAPHY | Age: 44
Discharge: HOME | End: 2024-01-15
Attending: OBSTETRICS & GYNECOLOGY
Payer: COMMERCIAL

## 2024-01-15 DIAGNOSIS — Z12.31 ENCOUNTER FOR SCREENING MAMMOGRAM FOR MALIGNANT NEOPLASM OF BREAST: ICD-10-CM

## 2024-01-15 PROCEDURE — 77063 BREAST TOMOSYNTHESIS BI: CPT | Performed by: OBSTETRICS & GYNECOLOGY

## 2024-01-15 PROCEDURE — 77067 SCR MAMMO BI INCL CAD: CPT | Performed by: OBSTETRICS & GYNECOLOGY

## 2024-01-25 ENCOUNTER — TELEPHONE (OUTPATIENT)
Dept: OBGYN | Age: 44
End: 2024-01-25

## 2024-01-25 ENCOUNTER — APPOINTMENT (OUTPATIENT)
Dept: OBGYN | Age: 44
End: 2024-01-25

## 2024-01-25 VITALS — DIASTOLIC BLOOD PRESSURE: 103 MMHG | SYSTOLIC BLOOD PRESSURE: 166 MMHG

## 2024-01-25 DIAGNOSIS — R58 EXCESSIVE BLEEDING: Primary | ICD-10-CM

## 2024-01-25 RX ORDER — MEDROXYPROGESTERONE ACETATE 150 MG/ML
150 INJECTION, SUSPENSION INTRAMUSCULAR ONCE
Status: COMPLETED | OUTPATIENT
Start: 2024-01-25 | End: 2024-01-25

## 2024-01-25 RX ORDER — PREDNISONE 10 MG/1
1 TABLET ORAL DAILY
COMMUNITY
Start: 2024-01-05 | End: 2024-03-05

## 2024-01-25 RX ADMIN — MEDROXYPROGESTERONE ACETATE 150 MG: 150 INJECTION, SUSPENSION INTRAMUSCULAR at 10:06

## 2024-01-26 ENCOUNTER — OFFICE VISIT (OUTPATIENT)
Dept: INTERNAL MEDICINE CLINIC | Facility: CLINIC | Age: 44
End: 2024-01-26
Payer: COMMERCIAL

## 2024-01-26 VITALS
HEART RATE: 130 BPM | SYSTOLIC BLOOD PRESSURE: 158 MMHG | DIASTOLIC BLOOD PRESSURE: 84 MMHG | OXYGEN SATURATION: 98 % | BODY MASS INDEX: 39.27 KG/M2 | WEIGHT: 208 LBS | HEIGHT: 61 IN

## 2024-01-26 DIAGNOSIS — M19.90 INFLAMMATORY ARTHRITIS: ICD-10-CM

## 2024-01-26 DIAGNOSIS — I10 ESSENTIAL HYPERTENSION: Primary | ICD-10-CM

## 2024-01-26 DIAGNOSIS — R00.0 TACHYCARDIA: ICD-10-CM

## 2024-01-26 PROCEDURE — 3008F BODY MASS INDEX DOCD: CPT | Performed by: INTERNAL MEDICINE

## 2024-01-26 PROCEDURE — 3079F DIAST BP 80-89 MM HG: CPT | Performed by: INTERNAL MEDICINE

## 2024-01-26 PROCEDURE — 3077F SYST BP >= 140 MM HG: CPT | Performed by: INTERNAL MEDICINE

## 2024-01-26 PROCEDURE — 99214 OFFICE O/P EST MOD 30 MIN: CPT | Performed by: INTERNAL MEDICINE

## 2024-01-26 RX ORDER — AMLODIPINE BESYLATE 5 MG/1
5 TABLET ORAL DAILY
Qty: 90 TABLET | Refills: 3 | Status: SHIPPED | OUTPATIENT
Start: 2024-01-26 | End: 2025-01-20

## 2024-01-26 NOTE — PROGRESS NOTES
Linda Minaya female 43 year old    Her  with child      Chief Complaint   Patient presents with    Blood Pressure     BP high at gyns  office  170/120 -  no sx     Has had  high  bp  at times  in past   with previous  physican       Pain is ok now  ( knee )    her finger wrist better ( concern for RA  since AM sx ) - her  RF  pos     Has sinus  tachy  has  had it for  may years   since  2016  - has had  w/u  no path found  - echo pending     Recent  cxr neg     Labs  hg  11  - tsh nl     Sed rate  high -  >100-  appt with rheum in march       Patient Active Problem List   Diagnosis    Lumbar disc herniation with radiculopathy    Left thyroid nodule    Left flank tenderness    Tachycardia    Patellofemoral pain syndrome of left knee    Patellar tendinitis of left knee    Tenderness of left patella    Spinal stenosis of lumbar region with neurogenic claudication    Asthma    Complicated migraine    Uterine leiomyoma    Low blood sugar    Isolated proteinuria    Encounter for female sterilization procedure    Lumbar trigger point syndrome    Myalgia    Lumbar spondylosis    Lumbar foraminal stenosis    Bulge of lumbar disc without myelopathy    DDD (degenerative disc disease), lumbosacral    Class 2 severe obesity due to excess calories with serious comorbidity and body mass index (BMI) of 37.0 to 37.9 in adult  (HCC)    Migraine without aura and without status migrainosus, not intractable    Pharyngoesophageal dysphagia    Vitamin D deficiency    Other specified anemias    Metrorrhagia    Menorrhagia    UTI symptoms    Carpal tunnel syndrome of left wrist          Current Outpatient Medications on File Prior to Visit   Medication Sig Dispense Refill    predniSONE 10 MG Oral Tab Take 1 tablet (10 mg total) by mouth daily. 30 tablet 1    fluticasone propionate 50 MCG/ACT Nasal Suspension 2 sprays by Nasal route daily. 16 g 3    Amitriptyline HCl 150 MG Oral Tab Take 1 tablet (150 mg total) by mouth nightly. 90  tablet 3    albuterol 108 (90 Base) MCG/ACT Inhalation Aero Soln Inhale 2 puffs into the lungs every 4 (four) hours as needed for Wheezing. 1 each 0    metoprolol succinate ER 25 MG Oral Tablet 24 Hr Take 1 tablet (25 mg total) by mouth daily. 90 tablet 0    Cholecalciferol (VITAMIN D) 1000 units Oral Tab Vitamin D      Omeprazole 40 MG Oral Capsule Delayed Release Take 1 capsule (40 mg total) by mouth daily. (Patient not taking: Reported on 1/26/2024)      tranexamic acid 650 MG Oral Tab Take 2 tablets (1,300 mg total) by mouth 3 (three) times daily. (Patient not taking: Reported on 1/26/2024)       No current facility-administered medications on file prior to visit.          Vitals:    01/26/24 1241   BP: 158/84   Pulse: (!) 130   VITALSBody mass index is 39.3 kg/m².    Pertinent findings on the physical exam; repeat  -  no distress neuro nl  - cor reg  chest clear - no obvious  arthritis     Linda was seen today for blood pressure.    Diagnoses and all orders for this visit:    Essential hypertension    Tachycardia    Inflammatory arthritis    Other orders  -     amLODIPine 5 MG Oral Tab; Take 1 tablet (5 mg total) by mouth daily.    Discussed  bp and  tachycardia - nontoxic  no signs of acute process ie bleeding infection     Await  echo  feb 14 -  With  possible  CVD - consider pulm htn     Increase  metoprol to  25 bid     Start  norvasc  5     See back soon      Keep on prednisone cut dose to 5 mg  have high suspicion for Cvdz       Receck bp in 0ne week   This note was prepared using Dragon Medical voice recognition dictation software and as a result, errors may occur. When identified, these errors have been corrected. While every attempt is made to correct errors during dictation, discrepancies may still exist

## 2024-01-30 ENCOUNTER — E-ADVICE (OUTPATIENT)
Dept: OTHER | Age: 44
End: 2024-01-30

## 2024-02-08 NOTE — H&P
[facility] - Gastroenterology                                                                                                               Reason for consult: eval    Requesting physician or provider: Og Davison MD    Chief Complaint   Patient presents with    Abdominal Pain     Hiatal hernia , heartburn, abnormal US results due to nodule /thyroid       HPI:   Linda Minaya is a 43 year old year-old female with history of arrhythmia, asthma, gerd, htn, migraines, oa, palpitations:    she is here today for evaluation dysphagia  Eval by ent  Has gerd  Had ugi 4/28/23 - hh, minimal reflux during vaslava, no penetration, speech  Ultrasound head/neck 5/2023  Found to have thyroid nodules  Vfss 8/2023-no aspiration-given tongue exercises to do    Gb ultrasound 4/2022-small polyp    she moves her bowels every 2 days.  Constipation new x last 6 mos. No change in diet, activity, medication. She uses dulcolax if she does not have a bm for 2 days.  Used to go daily.   she denies brbpr and/or melena.    she has gerd x years.  Sx worse lately and takes omeprazole 40 mg/daily w/ improvement. she has dysphagia with solids x 1 year. No issues with liquid.  No change in sx with use of omeprazole. she has abdominal pain and bloating that sometimes improves with bm. she has nausea w/ constipation. No vomiting.  she denies recent change in appetite and/or unintentional weight loss.    Chronic anemia  Last cbc 1/2024 hgb 11.4 and stable from comparison    NSAIDS: no  Tobacco: no  Alcohol: no  Marijuana: no  Illicit drugs: no    No FH GI malignancy  No fhx gb ca    Tachycardic with sedation  No RAYRAY  No anticoagulants  No pacemaker/defibrillator  No pain medications and/or sleep aides    Last colonoscopy: no  Last EGD: no    Wt Readings from Last 6 Encounters:   02/13/24 209 lb (94.8 kg)   01/26/24 208 lb (94.3 kg)   01/05/24 208 lb (94.3 kg)   11/03/23 205 lb (93 kg)   10/19/23 203 lb (92.1  kg)   23 115 lb (52.2 kg)        History, Medications, Allergies, ROS:      Past Medical History:   Diagnosis Date    Anesthesia complication     increase heart rate after anesthesia    Arrhythmia     Asthma     Back problem     Disorder of thyroid     thyroid nodules    Esophageal reflux     Essential hypertension     High blood pressure     Migraines     Osteoarthritis     Palpitations     Visual impairment     wears glasses      Past Surgical History:   Procedure Laterality Date    ARTHROSCOPY OF JOINT UNLISTED Left     knee x 2          5 yrs ago      Family Hx:   Family History   Problem Relation Age of Onset    Hypertension Father     Diabetes Mother     Other (hepatitis) Mother         hepatitis C    No Known Problems Son     Colon Polyps Maternal Grandmother     Diabetes Maternal Grandmother     Heart Disorder Maternal Grandmother 40        atrial fibrillation, heart problems in cousins, some who smoke    Other (hepatitis C) Maternal Grandmother     Breast Cancer Maternal Aunt 40    Cancer Other 40        breast cancer    Other (breast cancer) Other         40's    Other (colon cancer) Other         40's      Social History:   Social History     Socioeconomic History    Marital status:    Tobacco Use    Smoking status: Never    Smokeless tobacco: Never   Vaping Use    Vaping Use: Never used   Substance and Sexual Activity    Alcohol use: No     Alcohol/week: 0.0 standard drinks of alcohol     Comment: rare    Drug use: No   Other Topics Concern    Caffeine Concern Yes     Comment: Coffee: 1 cup daily    Exercise No   Social History Narrative    The patient does not use an assistive device..      The patient does not live in a home with stairs.        Medications (Active prior to today's visit):  Current Outpatient Medications   Medication Sig Dispense Refill    amLODIPine 5 MG Oral Tab Take 1 tablet (5 mg total) by mouth daily. 90 tablet 3    predniSONE 10 MG Oral Tab Take 1 tablet (10  mg total) by mouth daily. 30 tablet 1    fluticasone propionate 50 MCG/ACT Nasal Suspension 2 sprays by Nasal route daily. 16 g 3    Omeprazole 40 MG Oral Capsule Delayed Release Take 1 capsule (40 mg total) by mouth daily.      Amitriptyline HCl 150 MG Oral Tab Take 1 tablet (150 mg total) by mouth nightly. 90 tablet 3    tranexamic acid 650 MG Oral Tab Take 2 tablets (1,300 mg total) by mouth 3 (three) times daily.      albuterol 108 (90 Base) MCG/ACT Inhalation Aero Soln Inhale 2 puffs into the lungs every 4 (four) hours as needed for Wheezing. 1 each 0    metoprolol succinate ER 25 MG Oral Tablet 24 Hr Take 1 tablet (25 mg total) by mouth daily. 90 tablet 0    Cholecalciferol (VITAMIN D) 1000 units Oral Tab Vitamin D         Allergies:  No Known Allergies    ROS:   CONSTITUTIONAL: negative for fevers, chills, sweats and weight loss  EYES Negative for red eyes, yellow eyes, changes in vision  HEENT: Positive for dysphagia and negative for hoarseness  RESPIRATORY: Negative for cough and shortness of breath  CARDIOVASCULAR: Negative for chest pain, palpitations  GASTROINTESTINAL: See HPI  GENITOURINARY: Negative for dysuria and frequency  MUSCULOSKELETAL: Negative for arthralgias and myalgias  NEUROLOGICAL: Negative for dizziness and headaches  BEHAVIOR/PSYCH: Negative for anxiety and poor appetite    PHYSICAL EXAM:   Blood pressure 145/87, pulse (!) 130, height 5' 1\" (1.549 m), weight 209 lb (94.8 kg), not currently breastfeeding.    GEN: WD/WN, NAD  HEENT: Supple symmetrical, trachea midline  CV: RRR, the extremities are warm and well perfused   LUNGS: No increased work of breathing  ABDOMEN: No scars, normal bowel sounds, soft, non-tender, non-distended no rebound or guarding, no masses, no hepatomegaly  MSK: No redness, no warmth, no swelling of joints  SKIN: No jaundice, no erythema, no rashes  HEMATOLOGIC: No bleeding, no bruising  NEURO: Alert and interactive, normal gait    Labs/Imaging/Procedures:      Patient's pertinent labs and imaging were reviewed and discussed with patient today.        .  ASSESSMENT/PLAN:   Linda Minaya is a 43 year old year-old female with history of arrhythmia, asthma, gerd, htn, migraines, oa, palpitations:    #change in bowel habits  #constipation  #bloating  #abd pain  #nausea  New onset constipation x last 6 mos w/ some improvement w/ prn dulcolax. I suspect bloating, nausea, and gen abd pain are related. No brbpr, melena. No fhx gi malignancy. Chronic, stable anemia.  Normal tsh.  Has not had a cln and will be due for screening.  Plan for procedure now. Recommend f/u with gyne as well.    #gb polyp  Noted on imaging 2022. No fhx gb ca. Plan for ultrasound for surveillance.    #gerd  #dysphagia  Chronic gerd w/ new onset dysphagia. Reflux improved with omeprazole.  No change in dysphagia w/u with ent neg for aspiration, penetration.  Notable for thyroid nodules. ENT requesting egd first to exclude gi etiology of complaints.      -miralax 1-2 capfuls daily and adjust as needed  -ultrasound  -see gyne  -reflux diet modification  -omeprazole  -small bites  -care with chewing, swallowing    Tachycardia- cards clearance from Dr. Davison    1. Schedule colonoscopy/egd with possible dil w/ MAC w/ Dr. Angeles or Dr. Morales [Diagnosis: gerd, dysphagia, change in bowel habits)    2. Trilyte split dose     3. Continue all medications prior to procedure    4. Read all bowel prep instructions carefully    5. AVOID seeds, nuts, popcorn, raw fruits and vegetables (cooked is okay) for 2-3 days before procedure    6.  You MAY need to go for COVID testing 72 hours before procedure. The testing team will call you a few days before your procedure to discuss with you if testing is required. If you are asked to go for COVID testing and do not completed the test, the procedure cannot be performed.     7. If you start any NEW medication after your visit today, please notify us. Certain medications will  need to be held before the procedure, or the procedure cannot be performed.     >>>Please note: if you were prescribed Suprep for the bowel prep and it is too expensive or not covered by insurance, it is okay to substitute Trilyte (or any similar generic prep). This can be done by notifying the pharmacy or calling our office.      Orders This Visit:  No orders of the defined types were placed in this encounter.      Meds This Visit:  Requested Prescriptions      No prescriptions requested or ordered in this encounter       Imaging & Referrals:  None    ENDOSCOPIC RISK BENEFIT DISCUSSION: I described the procedure in great detail with the patient. I discussed the risks and benefits, including but not limited to: bleeding, perforation, infection, anesthesia complications, and even death. Patient will be NPO after midnight and will have a person physically present at time of pick-up to drive patient home. Patient verbalized understanding and agrees to proceed with procedure as planned.    Lesley Haney, APRN   2/13/2024        This note was partially prepared using Dragon [employer] voice recognition dictation software. As a result, errors may occur. When identified, these errors have been corrected. While every attempt is made to correct errors during dictation, discrepancies may still exist.

## 2024-02-13 ENCOUNTER — OFFICE VISIT (OUTPATIENT)
Facility: CLINIC | Age: 44
End: 2024-02-13
Payer: COMMERCIAL

## 2024-02-13 ENCOUNTER — TELEPHONE (OUTPATIENT)
Facility: CLINIC | Age: 44
End: 2024-02-13

## 2024-02-13 ENCOUNTER — TELEPHONE (OUTPATIENT)
Dept: GASTROENTEROLOGY | Facility: CLINIC | Age: 44
End: 2024-02-13

## 2024-02-13 VITALS
SYSTOLIC BLOOD PRESSURE: 138 MMHG | HEIGHT: 61 IN | WEIGHT: 209 LBS | DIASTOLIC BLOOD PRESSURE: 80 MMHG | BODY MASS INDEX: 39.46 KG/M2 | HEART RATE: 130 BPM

## 2024-02-13 DIAGNOSIS — R11.0 NAUSEA: ICD-10-CM

## 2024-02-13 DIAGNOSIS — R19.4 CHANGE IN BOWEL HABITS: ICD-10-CM

## 2024-02-13 DIAGNOSIS — K21.9 GASTROESOPHAGEAL REFLUX DISEASE, UNSPECIFIED WHETHER ESOPHAGITIS PRESENT: ICD-10-CM

## 2024-02-13 DIAGNOSIS — R14.0 BLOATING: ICD-10-CM

## 2024-02-13 DIAGNOSIS — K82.4 GALLBLADDER POLYP: ICD-10-CM

## 2024-02-13 DIAGNOSIS — R19.4 CHANGE IN BOWEL HABITS: Primary | ICD-10-CM

## 2024-02-13 DIAGNOSIS — R13.19 ESOPHAGEAL DYSPHAGIA: ICD-10-CM

## 2024-02-13 DIAGNOSIS — K59.00 CONSTIPATION, UNSPECIFIED CONSTIPATION TYPE: ICD-10-CM

## 2024-02-13 DIAGNOSIS — R13.19 ESOPHAGEAL DYSPHAGIA: Primary | ICD-10-CM

## 2024-02-13 DIAGNOSIS — R10.84 GENERALIZED ABDOMINAL PAIN: ICD-10-CM

## 2024-02-13 PROCEDURE — 99244 OFF/OP CNSLTJ NEW/EST MOD 40: CPT | Performed by: NURSE PRACTITIONER

## 2024-02-13 PROCEDURE — 3079F DIAST BP 80-89 MM HG: CPT | Performed by: NURSE PRACTITIONER

## 2024-02-13 PROCEDURE — 3008F BODY MASS INDEX DOCD: CPT | Performed by: NURSE PRACTITIONER

## 2024-02-13 PROCEDURE — 3077F SYST BP >= 140 MM HG: CPT | Performed by: NURSE PRACTITIONER

## 2024-02-13 RX ORDER — POLYETHYLENE GLYCOL 3350, SODIUM CHLORIDE, SODIUM BICARBONATE, POTASSIUM CHLORIDE 420; 11.2; 5.72; 1.48 G/4L; G/4L; G/4L; G/4L
POWDER, FOR SOLUTION ORAL
Qty: 4000 ML | Refills: 0 | Status: SHIPPED | OUTPATIENT
Start: 2024-02-13

## 2024-02-13 NOTE — TELEPHONE ENCOUNTER
Scheduled for:  Colonoscopy 80981/27626 , Egd with possible Dilatation 83831  Provider Name:  Dr. Angeles   Date:  6/11/24  Location:[facility]  Sedation:  MAC  Time:  12:30 Pm (Patient is aware arrival time is at 11:30 Am )  Prep:  Trilyte , Egd - Npo 3 hours before prior to procedure   Meds/Allergies Reconciled?:  JADEN Morin Reviewed  Diagnosis with codes:  Gerd K21.9 , Esophageal Dysphagia R13.19 ,changed in bowel habits R19.4  Was patient informed to call insurance with codes (Y/N):  Yes  Referral sent?:  Referral was sent at the time of electronic surgical scheduling.  EM or [facility] notified?:  I sent an electronic request to Endo Scheduling and received a confirmation today.  Medication Orders:  Pt is aware to NOT take iron pills, herbal meds and diet supplements for 7 days before exam. Also to NOT take any form of alcohol, recreational drugs and any forms of ED meds 24 hours before exam.   Continue all medications prior to procedure   Misc Orders:  Patient was informed about the new cancellation policy for his/her procedure. Patient was also given a copy of the cancellation policy at the time of the appointment and verbalized understanding.   Further instructions given by staff:  I provide prep instructions to patient at the time of the appointment and reviewed date, time and location, patient verbalized that she understood and is aware to call if she has any questions.

## 2024-02-13 NOTE — PATIENT INSTRUCTIONS
-miralax 1-2 capfuls daily and adjust as needed  -ultrasound  -see gyne  -reflux diet modification  -omeprazole  -small bites  -care with chewing, swallowing    1. Schedule colonoscopy/egd with possible dil w/ CHANTE w/ Dr. Angeles or Dr. Morales [Diagnosis: gerd, dysphagia, change in bowel habits)    2. Trilyte split dose     3. Continue all medications prior to procedure    4. Read all bowel prep instructions carefully    5. AVOID seeds, nuts, popcorn, raw fruits and vegetables (cooked is okay) for 2-3 days before procedure    6.  You MAY need to go for COVID testing 72 hours before procedure. The testing team will call you a few days before your procedure to discuss with you if testing is required. If you are asked to go for COVID testing and do not completed the test, the procedure cannot be performed.     7. If you start any NEW medication after your visit today, please notify us. Certain medications will need to be held before the procedure, or the procedure cannot be performed.     >>>Please note: if you were prescribed Suprep for the bowel prep and it is too expensive or not covered by insurance, it is okay to substitute Trilyte (or any similar generic prep). This can be done by notifying the pharmacy or calling our office.        Tips to Control Acid Reflux    To control acid reflux, you’ll need to make some basic diet and lifestyle changes. The simple steps outlined below may be all you’ll need to ease discomfort.   Watch what you eat  Don't have fatty foods or spicy foods.  Eat fewer acidic foods, such as citrus and tomato-based foods. These can increase symptoms.  Limit drinking alcohol, caffeine, and fizzy beverages. All increase acid reflux.  Try limiting chocolate, peppermint, and spearmint. These can make acid reflux worse in some people.     Watch when you eat  Don't lie down for 3 hours after eating.  Don't snack before going to bed.     Raise your head  Raising your head and upper body by 4 to 6 inches  helps limit reflux when you’re lying down. Put blocks under the head of your bed frame or a wedge under your mattress to raise it.   Other changes  Lose weight, if you need to  Don’t exercise near bedtime  Don't wear tight-fitting clothes  Limit aspirin and ibuprofen  Stop smoking     Stacey last reviewed this educational content on 6/1/2019 © 2000-2020 [employer]. [address] [place] [address], [place] [address]. All rights reserved. This information is not intended as a substitute for professional medical care. Always follow your healthcare professional's instructions.

## 2024-02-13 NOTE — TELEPHONE ENCOUNTER
Nursing:  She has tachycardia and is scheduled for w/u.  Please get cards clearance from Dr. Davison.    Thanks,  C

## 2024-02-14 ENCOUNTER — HOSPITAL ENCOUNTER (OUTPATIENT)
Dept: CV DIAGNOSTICS | Facility: HOSPITAL | Age: 44
Discharge: HOME | End: 2024-02-14
Attending: INTERNAL MEDICINE
Payer: COMMERCIAL

## 2024-02-14 DIAGNOSIS — R00.0 TACHYCARDIA: ICD-10-CM

## 2024-02-14 PROCEDURE — 93306 TTE W/DOPPLER COMPLETE: CPT | Performed by: INTERNAL MEDICINE

## 2024-02-29 ENCOUNTER — APPOINTMENT (OUTPATIENT)
Dept: OBGYN | Age: 44
End: 2024-02-29

## 2024-02-29 NOTE — TELEPHONE ENCOUNTER
Noted, patient's procedure is scheduled for 6/11/24. Will obtain clearance closer to procedure date.

## 2024-03-11 ENCOUNTER — HOSPITAL ENCOUNTER (OUTPATIENT)
Dept: GENERAL RADIOLOGY | Facility: HOSPITAL | Age: 44
Discharge: HOME | End: 2024-03-11
Attending: INTERNAL MEDICINE
Payer: COMMERCIAL

## 2024-03-11 ENCOUNTER — LAB ENCOUNTER (OUTPATIENT)
Dept: LAB | Facility: HOSPITAL | Age: 44
End: 2024-03-11
Attending: INTERNAL MEDICINE
Payer: COMMERCIAL

## 2024-03-11 ENCOUNTER — OFFICE VISIT (OUTPATIENT)
Dept: RHEUMATOLOGY | Facility: CLINIC | Age: 44
End: 2024-03-11
Payer: COMMERCIAL

## 2024-03-11 VITALS
BODY MASS INDEX: 39.65 KG/M2 | SYSTOLIC BLOOD PRESSURE: 155 MMHG | HEART RATE: 137 BPM | HEIGHT: 61 IN | WEIGHT: 210 LBS | DIASTOLIC BLOOD PRESSURE: 83 MMHG

## 2024-03-11 DIAGNOSIS — M25.50 POLYARTHRALGIA: ICD-10-CM

## 2024-03-11 DIAGNOSIS — M05.79 RHEUMATOID ARTHRITIS INVOLVING MULTIPLE SITES WITH POSITIVE RHEUMATOID FACTOR (HCC): Primary | ICD-10-CM

## 2024-03-11 DIAGNOSIS — E04.2 MULTINODULAR GOITER: ICD-10-CM

## 2024-03-11 DIAGNOSIS — G25.2 ACTION TREMOR: ICD-10-CM

## 2024-03-11 DIAGNOSIS — M79.10 MYALGIA: ICD-10-CM

## 2024-03-11 DIAGNOSIS — M05.79 RHEUMATOID ARTHRITIS INVOLVING MULTIPLE SITES WITH POSITIVE RHEUMATOID FACTOR (HCC): ICD-10-CM

## 2024-03-11 LAB
ALBUMIN SERPL-MCNC: 4.6 G/DL (ref 3.2–4.8)
ALBUMIN/GLOB SERPL: 1.4 {RATIO} (ref 1–2)
ALP LIVER SERPL-CCNC: 101 U/L
ALT SERPL-CCNC: 13 U/L
ANION GAP SERPL CALC-SCNC: 11 MMOL/L (ref 0–18)
AST SERPL-CCNC: 15 U/L (ref ?–34)
BASOPHILS # BLD AUTO: 0.05 X10(3) UL (ref 0–0.2)
BASOPHILS NFR BLD AUTO: 0.6 %
BILIRUB SERPL-MCNC: <0.2 MG/DL (ref 0.3–1.2)
BUN BLD-MCNC: 10 MG/DL (ref 9–23)
BUN/CREAT SERPL: 11.6 (ref 10–20)
CALCIUM BLD-MCNC: 9.2 MG/DL (ref 8.7–10.4)
CHLORIDE SERPL-SCNC: 106 MMOL/L (ref 98–112)
CO2 SERPL-SCNC: 22 MMOL/L (ref 21–32)
CREAT BLD-MCNC: 0.86 MG/DL
DEPRECATED RDW RBC AUTO: 43.5 FL (ref 35.1–46.3)
EGFRCR SERPLBLD CKD-EPI 2021: 85 ML/MIN/1.73M2 (ref 60–?)
EOSINOPHIL # BLD AUTO: 0.07 X10(3) UL (ref 0–0.7)
EOSINOPHIL NFR BLD AUTO: 0.8 %
ERYTHROCYTE [DISTWIDTH] IN BLOOD BY AUTOMATED COUNT: 17.4 % (ref 11–15)
FASTING STATUS PATIENT QL REPORTED: NO
GLOBULIN PLAS-MCNC: 3.4 G/DL (ref 2.8–4.4)
GLUCOSE BLD-MCNC: 221 MG/DL (ref 70–99)
HBV CORE AB SERPL QL IA: NONREACTIVE
HBV SURFACE AB SER QL: NONREACTIVE
HBV SURFACE AB SERPL IA-ACNC: <3.1 MIU/ML
HBV SURFACE AG SER-ACNC: 0.15 [IU]/L
HBV SURFACE AG SERPL QL IA: NONREACTIVE
HCT VFR BLD AUTO: 37.9 %
HCV AB SERPL QL IA: NONREACTIVE
HGB BLD-MCNC: 11.3 G/DL
IMM GRANULOCYTES # BLD AUTO: 0.03 X10(3) UL (ref 0–1)
IMM GRANULOCYTES NFR BLD: 0.3 %
LYMPHOCYTES # BLD AUTO: 2.11 X10(3) UL (ref 1–4)
LYMPHOCYTES NFR BLD AUTO: 24.1 %
MCH RBC QN AUTO: 20.8 PG (ref 26–34)
MCHC RBC AUTO-ENTMCNC: 29.8 G/DL (ref 31–37)
MCV RBC AUTO: 69.7 FL
MONOCYTES # BLD AUTO: 0.6 X10(3) UL (ref 0.1–1)
MONOCYTES NFR BLD AUTO: 6.8 %
NEUTROPHILS # BLD AUTO: 5.9 X10 (3) UL (ref 1.5–7.7)
NEUTROPHILS # BLD AUTO: 5.9 X10(3) UL (ref 1.5–7.7)
NEUTROPHILS NFR BLD AUTO: 67.4 %
OSMOLALITY SERPL CALC.SUM OF ELEC: 294 MOSM/KG (ref 275–295)
PLATELET # BLD AUTO: 539 10(3)UL (ref 150–450)
POTASSIUM SERPL-SCNC: 3.1 MMOL/L (ref 3.5–5.1)
PROT SERPL-MCNC: 8 G/DL (ref 5.7–8.2)
RBC # BLD AUTO: 5.44 X10(6)UL
RHEUMATOID FACT SERPL-ACNC: 16 IU/ML (ref ?–14)
SODIUM SERPL-SCNC: 139 MMOL/L (ref 136–145)
TSI SER-ACNC: 0.67 MIU/ML (ref 0.55–4.78)
WBC # BLD AUTO: 8.8 X10(3) UL (ref 4–11)

## 2024-03-11 PROCEDURE — 86431 RHEUMATOID FACTOR QUANT: CPT | Performed by: INTERNAL MEDICINE

## 2024-03-11 PROCEDURE — 85025 COMPLETE CBC W/AUTO DIFF WBC: CPT | Performed by: INTERNAL MEDICINE

## 2024-03-11 PROCEDURE — 73130 X-RAY EXAM OF HAND: CPT | Performed by: INTERNAL MEDICINE

## 2024-03-11 PROCEDURE — 36415 COLL VENOUS BLD VENIPUNCTURE: CPT

## 2024-03-11 PROCEDURE — 86803 HEPATITIS C AB TEST: CPT | Performed by: INTERNAL MEDICINE

## 2024-03-11 PROCEDURE — 87340 HEPATITIS B SURFACE AG IA: CPT | Performed by: INTERNAL MEDICINE

## 2024-03-11 PROCEDURE — 84165 PROTEIN E-PHORESIS SERUM: CPT

## 2024-03-11 PROCEDURE — 86704 HEP B CORE ANTIBODY TOTAL: CPT | Performed by: INTERNAL MEDICINE

## 2024-03-11 PROCEDURE — 84443 ASSAY THYROID STIM HORMONE: CPT

## 2024-03-11 PROCEDURE — 80053 COMPREHEN METABOLIC PANEL: CPT | Performed by: INTERNAL MEDICINE

## 2024-03-11 PROCEDURE — 83521 IG LIGHT CHAINS FREE EACH: CPT

## 2024-03-11 PROCEDURE — 86334 IMMUNOFIX E-PHORESIS SERUM: CPT

## 2024-03-11 PROCEDURE — 81374 HLA I TYPING 1 ANTIGEN LR: CPT

## 2024-03-11 PROCEDURE — 86706 HEP B SURFACE ANTIBODY: CPT | Performed by: INTERNAL MEDICINE

## 2024-03-11 PROCEDURE — 86480 TB TEST CELL IMMUN MEASURE: CPT

## 2024-03-11 RX ORDER — PREDNISONE 10 MG/1
TABLET ORAL
Qty: 60 TABLET | Refills: 0 | Status: SHIPPED | OUTPATIENT
Start: 2024-03-11

## 2024-03-11 RX ORDER — FOLIC ACID 1 MG/1
1 TABLET ORAL DAILY
Qty: 90 TABLET | Refills: 0 | Status: SHIPPED | OUTPATIENT
Start: 2024-03-11

## 2024-03-11 RX ORDER — METHOTREXATE 2.5 MG/1
TABLET ORAL
Qty: 78 TABLET | Refills: 0 | Status: SHIPPED | OUTPATIENT
Start: 2024-03-11

## 2024-03-11 NOTE — PROGRESS NOTES
Linda Minaya is a 43 year old female who presents for   Chief Complaint   Patient presents with    Consult     Referred by PCP  for Elevated CRP and Sed Rate     Hand Pain     Fingers pain     Leg Pain     Dar    .   HPI:   CC: joint pain  Consult: referred by PCP Dr. Og Davison     This is a 42 yo F with hx of HTN, GERD, Asthma, Migraine's, Left knee arthroscopy referred for polyarthralgia's.  She reports having diffuse joint pain for many years starting in her 30s but worsened over the past 2 or 3 years.  She has pain and swelling in all her joints and her muscles.  Her hands will swell when she cannot close them.  She states that her body aches all the time.  She will take a hot shower in the morning which does help but then pain comes back right away.  She has to lay for 20 minutes in bed to loosen up.  She reports a rash on both legs in the calf region.  No history of psoriasis.  She also has dry eyes and dry mouth.  She follows with neurology Dr. Lam for migraines and tremors.  She was placed on gabapentin and muscle relaxers for some of her muscle pain but did not help.  Blood work showing elevated inflammation markers and RF of 20.  She states that her mom does have RA.  Was placed on prednisone 10 mg daily for a month by her PCP, joint pain and swelling did improve.    Blood work:  Neg CTD screen, dsDNA, CCP, UA 5.1  RF 20, , CRP 2.4 mg/dL      Wt Readings from Last 2 Encounters:   03/11/24 210 lb (95.3 kg)   02/13/24 209 lb (94.8 kg)     Body mass index is 39.68 kg/m².      Current Outpatient Medications   Medication Sig Dispense Refill    PEG 3350-KCl-Na Bicarb-NaCl (TRILYTE) 420 g Oral Recon Soln Take prep as directed by gastro office. May substitute with Trilyte/generic equivalent if needed. 4000 mL 0    amLODIPine 5 MG Oral Tab Take 1 tablet (5 mg total) by mouth daily. 90 tablet 3    fluticasone propionate 50 MCG/ACT Nasal Suspension 2 sprays by Nasal route daily. 16 g 3     Omeprazole 40 MG Oral Capsule Delayed Release Take 1 capsule (40 mg total) by mouth daily.      Amitriptyline HCl 150 MG Oral Tab Take 1 tablet (150 mg total) by mouth nightly. 90 tablet 3    tranexamic acid 650 MG Oral Tab Take 2 tablets (1,300 mg total) by mouth 3 (three) times daily.      albuterol 108 (90 Base) MCG/ACT Inhalation Aero Soln Inhale 2 puffs into the lungs every 4 (four) hours as needed for Wheezing. 1 each 0    metoprolol succinate ER 25 MG Oral Tablet 24 Hr Take 1 tablet (25 mg total) by mouth daily. 90 tablet 0    Cholecalciferol (VITAMIN D) 1000 units Oral Tab Vitamin D        Past Medical History:   Diagnosis Date    Anesthesia complication     increase heart rate after anesthesia    Arrhythmia     Asthma (HCC)     Back problem     Disorder of thyroid     thyroid nodules    Esophageal reflux     Essential hypertension     High blood pressure     Migraines     Osteoarthritis     Palpitations     Visual impairment     wears glasses      Past Surgical History:   Procedure Laterality Date    ARTHROSCOPY OF JOINT UNLISTED Left     knee x 2          5 yrs ago      Family History   Problem Relation Age of Onset    Hypertension Father     Diabetes Mother     Other (hepatitis) Mother         hepatitis C    No Known Problems Son     Colon Polyps Maternal Grandmother     Diabetes Maternal Grandmother     Heart Disorder Maternal Grandmother 40        atrial fibrillation, heart problems in cousins, some who smoke    Other (hepatitis C) Maternal Grandmother     Breast Cancer Maternal Aunt 40    Cancer Other 40        breast cancer    Other (breast cancer) Other         40's    Other (colon cancer) Other         40's      Social History:  Social History     Socioeconomic History    Marital status:    Tobacco Use    Smoking status: Never    Smokeless tobacco: Never   Vaping Use    Vaping Use: Never used   Substance and Sexual Activity    Alcohol use: No     Alcohol/week: 0.0 standard drinks of  alcohol     Comment: rare    Drug use: No   Other Topics Concern    Caffeine Concern Yes     Comment: Coffee: 1 cup daily    Exercise No   Social History Narrative    The patient does not use an assistive device..      The patient does not live in a home with stairs.           REVIEW OF SYSTEMS:   Review Of Systems:  Constitutional: No fever, no change in weight or appetitie  Derm: No rashes, no oral ulcers, no alopecia, no photosensitivity, no psoriasis  HEENT: No dry eyes, no dry mouth, no Raynaud's, no nasal ulcers, no parotid swelling, no neck pain, no jaw pain, no temple pain  Eyes: No visual changes,   CVS: No chest pain, no heart disease  RS: No SOB, no Cough, No Pleurtic pain,   GI: No nausea, no vomiiting, no abominal pain, no hx of ulcer, no gastritis, no heartburn, no dyshpagia, no BRBPR or melena  : no dysuria, no hx of miscarriages, no DVT Hx, no hx of OCP,   Neuro: +numbness or tingling, no headache, no hx of seizures,   Psych: no hx of anxiety or depression  ENDO: no hx of thyroid disease, no hx of DM  Joint/Muscluskeltal: see HPI,   All other ROS are negative.     EXAM:   /83 (BP Location: Right arm, Patient Position: Sitting, Cuff Size: adult)   Pulse (!) 137   Ht 5' 1\" (1.549 m)   Wt 210 lb (95.3 kg)   LMP  (LMP Unknown)   BMI 39.68 kg/m²   GEN: AAOx3, NAD  HEENT: EOMI, PERRLA, no injection or icterus, oral mucosa moist, no oral lesions. No lymphadenopathy. No facial rash  CVS: RRR, no murmurs rubs or gallops. Equal 2+ distal pulses.   LUNGS: CTAB, no increased work of breathing  ABDOMEN:  soft NT/ND, +BS, no HSM  SKIN: No rashes or skin lesions. No nail findings  MSK:  TTP in paraspinal region, deltoids and thighs  TTP in MCPs and PIPs.  No swelling.  Able to make a fist  NEURO: Cranial nerves II-XII intact grossly. 5/5 strength throughout in both upper and lower extremities, sensation intact.  PSYCH: normal mood    LABS:     Component      Latest Ref Rng 11/3/2023 1/5/2024   Expanded  COREY Antibody Screen, IGG      <0.7 ug/l 0.30     Anti-dsDNA antibody      <10 IU/mL 3.0     Connective Tissue Disease Screen Interpretation      Negative  Negative     RHEUMATOID FACTOR      <14 IU/mL 20 (H)     URIC ACID      3.1 - 7.8 mg/dL 5.1     C-Citrullinated Peptide IgG AB      0.0 - 6.9 U/mL 2.9     SED RATE      0 - 20 mm/Hr 48 (H)  101 (H)    C-REACTIVE PROTEIN      <1.00 mg/dL 1.90 (H)  2.40 (H)         IMAGING:     XR Lumbar spine 2021:  Slight scoliosis with mild degenerative change lumbar spine.     MRI cervical spine 2019:  1. Minimal degenerative changes of the cervical spine with at most minimal foraminal narrowing, without further compromise of the neural structures.      2. No abnormal cord signal intensity.      3. No intramedullary enhancement of the cervical cord is identified.      4. Left-sided perineural cysts are present at C6-C7 and C7-T1.      5. No acute osseous injury of the cervical spine.     ASSESSMENT AND PLAN:     Polyarthralgias, possible seronegative RA  - She reports joint pain and swelling throughout her body, she has swelling in her hands where she cannot make a fist in the morning.  Blood work showing elevated ESR and CRP and RF of 20.  Mom has RA  - Discussed disease process of rheumatoid arthritis and treatment options  - Plan to get further blood work today  - X-rays of the hands  - Plan to start methotrexate 4 pills weekly for 2 weeks then increase to 6 pills weekly  Risks/Benefits of MTX d/w patient which include: nausea and vomiting, fatigue, oral ulcers, hepatic toxicity, pancytopenia, increased risk of infections, pneumonitis, flu-like symptoms, lymphoma, teratogenic. Pt should avoid alcohol use.   Will need to obtain Hep BsAb, Hep BsAg, Hep BcAb Total, Hepatitis C, Quantiferon TB, CBC, CMP, CXR  Labs will need to monitored every 4 weeks until patient is on a stable dose and then labs can be monitored every 3 months.   Pt advised to take Folic Acid 1 mg daily as it  can help prevent mouth sores  - Plan to also start prednisone 30 mg daily for 3 days then 20 mg daily for 3 days then 10 mg daily for 3 days and stay on 5 mg daily.  She was placed on prednisone recently and it helped her joint pain    Myofascial pain  - She also has a lot of muscle pain throughout her body.  She was on gabapentin and muscle relaxer in the past but did not help.    Thank you for allowing me to participate in this patients care. Pt will f/u in 6 weeks    Shweta Monson MD  3/11/2024  12:15 PM

## 2024-03-11 NOTE — PATIENT INSTRUCTIONS
Seen today for diffuse joint and muscle pain  Blood work and symptoms appear to be from rheumatoid arthritis  Plan to start you on methotrexate 4 pills every 7 days for 2 weeks and increase to 6 pills every 7 days  Folic acid 1 pill a day  Start prednisone 30 mg daily for 3 days then 20 mg daily for 3 days then 10 mg daily for 3 days then stay on 5 mg daily.  Each prednisone dose is a 10 mg tablet  Blood work today  Follow-up in 6 weeks on April 24 at 12:20 PM

## 2024-03-12 LAB
ALBUMIN SERPL ELPH-MCNC: 3.86 G/DL (ref 3.75–5.21)
ALBUMIN/GLOB SERPL: 1 {RATIO} (ref 1–2)
ALPHA1 GLOB SERPL ELPH-MCNC: 0.32 G/DL (ref 0.19–0.46)
ALPHA2 GLOB SERPL ELPH-MCNC: 1.14 G/DL (ref 0.48–1.05)
B-GLOBULIN SERPL ELPH-MCNC: 0.98 G/DL (ref 0.68–1.23)
GAMMA GLOB SERPL ELPH-MCNC: 1.4 G/DL (ref 0.62–1.7)
KAPPA LC FREE SER-MCNC: 3.4 MG/DL (ref 0.33–1.94)
KAPPA LC FREE/LAMBDA FREE SER NEPH: 1.79 {RATIO} (ref 0.26–1.65)
LAMBDA LC FREE SERPL-MCNC: 1.9 MG/DL (ref 0.57–2.63)
PROT SERPL-MCNC: 7.7 G/DL (ref 5.7–8.2)

## 2024-03-13 LAB
M TB IFN-G CD4+ T-CELLS BLD-ACNC: 0.02 IU/ML
M TB TUBERC IFN-G BLD QL: NEGATIVE
M TB TUBERC IGNF/MITOGEN IGNF CONTROL: >10 IU/ML
QFT TB1 AG MINUS NIL: 0 IU/ML
QFT TB2 AG MINUS NIL: 0.01 IU/ML

## 2024-03-18 ENCOUNTER — PATIENT MESSAGE (OUTPATIENT)
Dept: NEUROLOGY | Facility: CLINIC | Age: 44
End: 2024-03-18

## 2024-03-18 LAB — HLA-B27: NEGATIVE

## 2024-03-18 NOTE — TELEPHONE ENCOUNTER
From: Linda Minaya  To: Dougie Lam  Sent: 3/18/2024 6:56 AM CDT  Subject: Appointment    Hello,    I have an appointment on 3/22/2024 can it be a video visit my car will be in the shop on Friday.    Thanks

## 2024-03-21 ENCOUNTER — E-ADVICE (OUTPATIENT)
Dept: OTHER | Age: 44
End: 2024-03-21

## 2024-03-30 ENCOUNTER — HOSPITAL ENCOUNTER (OUTPATIENT)
Dept: ULTRASOUND IMAGING | Facility: HOSPITAL | Age: 44
Discharge: HOME | End: 2024-03-30
Attending: NURSE PRACTITIONER
Payer: COMMERCIAL

## 2024-03-30 DIAGNOSIS — K82.4 GALLBLADDER POLYP: ICD-10-CM

## 2024-03-30 PROCEDURE — 76705 ECHO EXAM OF ABDOMEN: CPT | Performed by: NURSE PRACTITIONER

## 2024-04-01 RX ORDER — AMITRIPTYLINE HYDROCHLORIDE 150 MG/1
150 TABLET ORAL NIGHTLY
Qty: 90 TABLET | Refills: 1 | OUTPATIENT
Start: 2024-04-01

## 2024-04-01 NOTE — TELEPHONE ENCOUNTER
Requested Prescriptions     Pending Prescriptions Disp Refills    AMITRIPTYLINE  MG Oral Tab [Pharmacy Med Name: AMITRIPTYLINE  MG TAB] 90 tablet 1     Sig: TAKE 1 TABLET BY MOUTH EVERY DAY AT NIGHT       Patient has a pending appointment tomorrow, 4/2/24.  Refills will be given at that time.    Reviewed and electronically signed by: ANTONIETA Mcdaniel

## 2024-04-02 ENCOUNTER — OFFICE VISIT (OUTPATIENT)
Dept: NEUROLOGY | Facility: CLINIC | Age: 44
End: 2024-04-02
Payer: COMMERCIAL

## 2024-04-02 VITALS — WEIGHT: 210 LBS | BODY MASS INDEX: 39.65 KG/M2 | HEIGHT: 61 IN

## 2024-04-02 DIAGNOSIS — R06.83 SNORING: ICD-10-CM

## 2024-04-02 DIAGNOSIS — R20.2 PARESTHESIAS: ICD-10-CM

## 2024-04-02 DIAGNOSIS — G47.00 INSOMNIA, UNSPECIFIED TYPE: ICD-10-CM

## 2024-04-02 DIAGNOSIS — M79.18 MYOFASCIAL PAIN: ICD-10-CM

## 2024-04-02 DIAGNOSIS — G25.2 ACTION TREMOR: Primary | ICD-10-CM

## 2024-04-02 DIAGNOSIS — M79.2 NEUROPATHIC PAIN: ICD-10-CM

## 2024-04-02 DIAGNOSIS — G43.009 MIGRAINE WITHOUT AURA AND WITHOUT STATUS MIGRAINOSUS, NOT INTRACTABLE: ICD-10-CM

## 2024-04-02 PROCEDURE — 3008F BODY MASS INDEX DOCD: CPT | Performed by: OTHER

## 2024-04-02 PROCEDURE — 99214 OFFICE O/P EST MOD 30 MIN: CPT | Performed by: OTHER

## 2024-04-02 RX ORDER — PREGABALIN 75 MG/1
CAPSULE ORAL
Qty: 105 CAPSULE | Refills: 0 | Status: SHIPPED | OUTPATIENT
Start: 2024-04-02

## 2024-04-02 RX ORDER — AMITRIPTYLINE HYDROCHLORIDE 150 MG/1
150 TABLET ORAL NIGHTLY
COMMUNITY
End: 2024-04-02

## 2024-04-02 RX ORDER — RIMEGEPANT SULFATE 75 MG/75MG
75 TABLET, ORALLY DISINTEGRATING ORAL AS NEEDED
Qty: 8 TABLET | Refills: 11 | Status: SHIPPED | OUTPATIENT
Start: 2024-04-02 | End: 2024-05-02

## 2024-04-02 RX ORDER — AMITRIPTYLINE HYDROCHLORIDE 150 MG/1
150 TABLET ORAL NIGHTLY
Qty: 90 TABLET | Refills: 3 | Status: SHIPPED | OUTPATIENT
Start: 2024-04-02 | End: 2025-04-02

## 2024-04-02 RX ORDER — TOPIRAMATE 25 MG/1
TABLET ORAL
Qty: 346 TABLET | Refills: 0 | Status: SHIPPED | OUTPATIENT
Start: 2024-04-02 | End: 2024-07-01

## 2024-04-02 NOTE — PROGRESS NOTES
[facility]  Neurology clinic follow-up note  Reason for visit:  tremors, pain, and migraines  PCP: Og Davison MD    Chief Complaint: Follow - Up (LV: 3/15/23 (telemed) - The pt presents stating that her tremors have worsened. Primarily tremors are on the L side hand and mouth. Pt states the gabapentin 300mg tid does not seem to help. Pt migraines have been consistently the same 2-3 migraines per week that last several hours. Pt would like to discuss alternate treatment for her migraines - currently taking Amitriptyline, Nurtec, riboflavin, and magnesium.)  I conducted a telehealth visit with Linda Minaya today,02/11/22 which was completed using two-way, real-time interactive audio  communication. This has been done in good grace to provide continuity of care in the best interest of the provider-patient relationship, due to the COVID -19 public health crisis/national emergency where restrictions of face-to-face office visits are ongoing. Every conscious effort was taken to allow for sufficient and adequate time to complete the visit.  The patient was made aware of the limitations of the telehealth visit, including treatment limitations as no physical exam could be performed.  The patient was advised to call 911 or to go to the ER in case there was an emergency.  The patient was also advised of the potential privacy & security concerns related to the telehealth platform.   The patient was made aware of where to find [facility]'s notice of privacy practices, telehealth consent form and other related consent forms and documents.  which are located on the [facility] website. The patient verbally agreed to telehealth consent form, related consents and the risks discussed.    Lastly, the patient confirmed that they were in [place].   Included in this visit, time may have been spent reviewing labs, medications, radiology tests and decision making. Appropriate medical decision-making and tests are ordered  as detailed in the plan of care above.  Coding/billing information is submitted for this visit based on complexity of care and/or time spent for the visit.    HPI:  Lnida Minaya is a 43 year old right-handed woman w/ a pmhx of thyroid nodules, hypertension, Hx left-sided action tremor/lip tremor, left L3 neuroforaminal stenosis, chronic low back pain, migraine without aura, and left-sided carpal tunnel syndrome evaluated via video visit visit for left sided tremors, low back pain, chronic insomnia, and migraine headaches.    04/02/24  Interval history/subjective:  Here  in follow up for insomnia, paraesthesias, and  tremors.  Tremors mainly left hand and left side of her mouth.  She saw her rheumatologist who also recommended shed f/u w/ neurology for her pain;  When he hand shakes she described it as \"pill rolling\"    When she holds up her hand her little finger separates and shakes.    She has sx of neuropathy  Her hands and feet are tingling.    Reports the left side of her body is weak.    Describes her muscle pain as burning.    Has seen Dr. Behar in the past, will to try trigger point injections for pain.        02/11/22 Interval history/subjective:  LCV was 8/25/2021; had a phone follow-up for amitriptyline for insomnia    Since then she saw Dr. Behar (physical medicine rehab) on 9/1/2021: She had left-sided radicular pain and weakness in her left EHL.  She was started on Flexeril and diclofenac, referred to PT, and referred to bariatrics for weight loss.    She then saw Dr. Camacho (orthopedic surgery) in January 2022 after she fell twice on the ice and developed left knee pain.  Recommended HEP.    Ms. Minaya conducted the video visit at her place of employment. She was in an open office area.  She was seated in her cubicle.  Her coworker was in the cubicle next to her. There was also at least one other person in the room with her.  I asked Ms. Minaya if she was comfortable discussing all her medical  history with other people in the room who were not her family members.  She states she had no issues.  I advised her that her personal medical history would be known to everyone who is able to hear our conversation.  I asked her if she would like to conduct her visit in a more private location.  She said that she had no objections and was \"okay with doing the video visit right here.  I do not care if they can hear.\"    Reports having tremors in her left hand and lip x 1 yr; initially was just left thumb and her upper lip. 6 mo ago involved her entire left hand.  \"sometimes,\" occurs simultaneously other times can be just the lip or just the left hand. Now involves her whole hand.  Handwriting is shakier not smaller  She stops what she is doing when it happens b/c of the tremor  \" Tremor is scary \"  No anosmia  No hypophonia; feels like she gets hoarse/raspy.    Her coworker states that Ms. Mejías tremor appears like \"someone who has the nervous shakes.\"  Stated that she can see it when she looks over from her cubicle at Ms. Minaya.    Reports she has difficulty swallowing  Reports globus sensation  Reports sometimes \"it comes back up.\" regurgitates food 3-4x a month.    Far vision is worse; it is blurrier; ongoing for 1 yr; last saw optometrist ([facility]) 1 yr ago notices it while driving,   Does not affect her ability to drive  Reports transient flashes in her vision.  Random patterns;     She reports that she fell 3 weeks ago; just went down. Fell forwards.  Unclear if he is referring to her recent fall in January (which was attributed to walking on the ice).    The Migraine Disability Assessment Test (MIDAS)    Note: Select zero if  they did not have the activity in the last 3 months.     On how many days in the last 3 months did you miss work or school because of your headaches?   0 days.    How many days in the last 3 months was your productivity at work or school reduced by half or more because of  your headaches? (Do not include days you counted in question 1 where you missed work or school.)  5 days.     On how many days in the last 3 months did you not do household work (such as housework, home repairs and maintenance, shopping, caring for children and relatives) because of your headaches?  5 days.     How many days in the last 3 months was your productivity in household work reduced by half of more because of your headaches? (Do not include days you counted in question 3 where you did not do household work.)  2 days.     On how many days in the last 3 months did you miss family, social or leisure activities because of your headaches?  0 days.      Total (Questions 1-5): 12 d             A.On how many days in the last 3 months did you have a headache? (If a headache lasted more than 1  day, count each day.)  36 headache days in a 3 mo period     B.On a scale of 0 - 10, on average how painful were these headaches? (where 0=no pain at all, and 10= pain as bad as it can be.)  Reports 10/10 pain.     Scoring:  After you have filled out this questionnaire, add the total number of days from questions 1-5 (ignore A and B).      MIDAS Grade  Definition  MIDAS Score       I        Little or No Disability     0-5          II     Mild Disability     6-10          III     Moderate Disability     11-20          IV     Severe Disability     21+          © [employer], 1997   © 2007, [email or web address]. All Rights reserved.     Prior failed migraine medications:  1. Sumatriptan 50 mg    Reports she cannot hear as well in her left ear; happens spontaneously and resolves spontaneously.     Not taking excedrin migraine >15x in a 30 day period. Also takes Tylenol; takes it 1-2x a mo w/ her cycle.    Wakes up feeling well rested. Does not wake up with a headache. Does not wake up in the middle night w// a HA. Does not wake up w/ cognitive sx.    [place] Sleepiness Scale: 0    08/25/21 interval  history:  We discussed amitriptyline can increase the risk of cardiac arrhythmias.  We discussed all the adverse effects of amitriptyline.  Explained that amitriptyline and nortriptyline can be used to treat neuropathic pain, and can cause sedation, but the main goal of these medications is not to use them to treat insomnia.  Ms. Minaya reports that her insomnia is secondary to her low back pain which is a follow-up visit with Dr. Behar.  She also has a follow-up visit scheduled with this author in September.  Discussed doses of amitriptyline.    provided refill.  Answered all questions.        Review and summation of prior records  12/4/2018 neurology consult note from [facility]: She presented with low back pain that of been ongoing for a year, and radiated down both of her legs.  She had a trial of physical therapy at the time but did not have any significant improvement.  On exam she had distal lower extremity pain that limited her ability to participate as well as weakness.  Her MRI demonstrated a disc bulge at L3 with some foraminal encroachment.  The patient's nortriptyline was uptitrated and she was given a Medrol Dosepak for acute management.  She was referred to PMR.  6/12/2019 she saw Dr. Behar for her knee pain.  10/16/2019 neurology clinic follow-up: Patient reported headaches with migrainous features, left hemibody sensory symptoms and a a lip tremor.  On exam she had decreased sensation to pinprick in the left side of her face, flattening of the left nasolabial fold, mild dysarthria, 4+ / 5 strength in her deltoids, 4/5 strength in her left hip flexors pain when testing deltoids, and a mild action tremor bilaterally, worse on the left.  Her sensation was decreased to pinprick in the left arm and leg.  She was noted to be hyperreflexic at her biceps bilaterally.  She had 1-2 beats of ankle clonus bilaterally as well as a positive Yosvany sign bilaterally.  Her toes were downgoing.  There is concerned she  may have a central process, specifically demyelinating disease, given no clear peripheral localization, an MRI of the brain was ordered.  As well as an MRI of the cervical spine.  Per Dr. Gutierrez's personal read of the MRI of the  L-spine she had lumbar spinal stenosis.  1/7/2020 office visit: Intermittent tremor still persisted, at times making it difficult for her screw off the lid.  Reported dropping things.  Also had difficulty with fine motor tasks.  Reported lateral bulge and at times while walking.  Reported having photophobia secondary to her migraines.  An EMG was scheduled for her carpal tunnel syndrome.      ROS:  Pertinent positive and negatives per HPI.  All others were reviewed and negative.      Past Medical History:   Diagnosis Date    Anesthesia complication     increase heart rate after anesthesia    Arrhythmia     Asthma (HCC)     Back problem     Disorder of thyroid     thyroid nodules    Esophageal reflux     Essential hypertension     High blood pressure     Migraines     Osteoarthritis     Palpitations     Visual impairment     wears glasses         Current Outpatient Medications:   Current Outpatient Medications   Medication Instructions    albuterol 108 (90 Base) MCG/ACT Inhalation Aero Soln 2 puffs, Inhalation, Every 4 hours PRN    Amitriptyline HCl (ELAVIL) 150 mg, Oral, Nightly    amLODIPine (NORVASC) 5 mg, Oral, Daily    Cholecalciferol (VITAMIN D) 1000 units Oral Tab Vitamin D    fluticasone propionate 50 MCG/ACT Nasal Suspension 2 sprays, Nasal, Daily    folic acid (FOLVITE) 1 mg, Oral, Daily    methotrexate 2.5 MG Oral Tab Start 4 pills weekly for 2 weeks then increase to 6 pills weekly    metoprolol succinate ER (TOPROL XL) 25 mg, Oral, Daily    Omeprazole 40 mg, Oral, Daily    PEG 3350-KCl-Na Bicarb-NaCl (TRILYTE) 420 g Oral Recon Soln Take prep as directed by gastro office. May substitute with Trilyte/generic equivalent if needed.    predniSONE 10 MG Oral Tab 30 mg daily x3 days  then 20 mg daily x3 days then 10 mg daily x3 days then stay on 5 mg daily    tranexamic acid (LYSTEDA) 1,300 mg, Oral, 3 times daily   No Known Allergies    Past Surgical History:   Procedure Laterality Date    ARTHROSCOPY OF JOINT UNLISTED Left     knee x 2          5 yrs ago       Family History   Problem Relation Age of Onset    Hypertension Father     Diabetes Mother     Other (hepatitis) Mother         hepatitis C    No Known Problems Son     Colon Polyps Maternal Grandmother     Diabetes Maternal Grandmother     Heart Disorder Maternal Grandmother 40        atrial fibrillation, heart problems in cousins, some who smoke    Other (hepatitis C) Maternal Grandmother     Breast Cancer Maternal Aunt 40    Cancer Other 40        breast cancer    Other (breast cancer) Other         40's    Other (colon cancer) Other         40's       Social history:  History   Smoking Status    Never   Smokeless Tobacco    Never     History   Alcohol Use No     Comment: rare     History   Drug Use No       Family History   Problem Relation Age of Onset    Hypertension Father     Diabetes Mother     Other (hepatitis) Mother         hepatitis C    No Known Problems Son     Colon Polyps Maternal Grandmother     Diabetes Maternal Grandmother     Heart Disorder Maternal Grandmother 40        atrial fibrillation, heart problems in cousins, some who smoke    Other (hepatitis C) Maternal Grandmother     Breast Cancer Maternal Aunt 40    Cancer Other 40        breast cancer    Other (breast cancer) Other         40's    Other (colon cancer) Other         40's       Objective:    Last vitals and weight :  There were no vitals filed for this visit.      Exam:  - General: appears stated age and no distress  - Pulmonary: no signs of respiratory distress. Normal excursion of the chest.    Neurologic Exam  - Mental Status: Alert and attentive.  Pleasant and cooperative.  Fund of knowledge are excellent..  Speech is spontaneous, fluent, and  prosodic. Comprehension and repetition intact. Phrase length and rate are normal.  - Cranial Nerves: No gaze preference. Visual fields: Able to see the examiner's entire face.  Pupils appear to be symmetric.  They appear to be equally round and reactive when the patient opens and closes eyes.  EOMI. No nystagmus. No ptosis. With self palpation she reports all 3 branches of trigeminal are intact bilaterally to light touch.No pathological facial asymmetry. No flattening of the nasolabial fold. .  No tremor in her upper lip was appreciated.  She is able to elevate and depress her brow without any sign of weakness.  She can bury her eyelids when she closes them tightly.  She can puff her cheeks symmetrically.  Hearing grossly intact.  Tongue midline.Palate and uvula elevate symmetrically.  Shoulder shrug symmetric.  No head tilt.    - Motor:   normal bulk.  Moving all 4 extremities spontaneously and symmetrically.  No focal weakness.  She is intact strength when she extends her legs.  Her finger taps are equal and symmetric.  There is no decrementation or fatiguing.  Rapid movements are symmetric.    Pronator drift: No pronator drift    Asterixis: No asterixis noted.   Tremor: Postural tremor on the left noted when her arms were held in supination to test for pronator drift.  Patient appreciated the tremor and and then flexed her left hand at the wrist to better demonstrate the tremor.  Tremor involved her left wrist.  Appeared as if her hand was rotating, almost as if the patient was waving the examiner.  There was a? rare myoclonic jerk appreciated in her left little finger and ring finger.  They appear to extend and almost pull involuntarily to the left.  Spontaneously resolved after 30 seconds.  Small amplitude.  Irregular.  No rest or action tremor was appreciated within the limits of the video visit.  There may have been a tremor present that was not visualized due to poor signal/bandwith issues etc.  - Sensory:  Denies any deficits  Light touch: normal  - Cerebellum: No truncal ataxia. No titubations. No dysmetria, no dysdiadochokinesis. No overshoot.  Gait: Normal gait and station.  She is able to rise from a seated position with her arms crossed without any difficulty.  She does not have a stooped posture.  She swing arms equally and symmetrically.  She has no en bloc turning.  Toe walking/heel walking: She is able to toe walk and heel walk without difficulty.      Data reviewed    Most recent lab results:       Test results/Imaging:     Lab Results   Component Value Date    TSH 0.673 03/11/2024     Lab Results   Component Value Date    HDL 48 10/28/2023     (H) 10/28/2023    TRIG 82 10/28/2023     Lab Results   Component Value Date    HGB 11.3 (L) 03/11/2024    HCT 37.9 03/11/2024    MCV 69.7 (L) 03/11/2024    WBC 8.8 03/11/2024    .0 (H) 03/11/2024      Lab Results   Component Value Date    BUN 10 03/11/2024    CA 9.2 03/11/2024    ALT 13 03/11/2024    AST 15 03/11/2024    ALB 4.6 03/11/2024    ALB 3.86 03/11/2024     03/11/2024    K 3.1 (L) 03/11/2024     03/11/2024    CO2 22.0 03/11/2024      I have reviewed labs.    Performed an independent visualization of MRI of the lumbar spine, MRI of the brain, MRI of the C-spine  Imaging revealed:   MRI of the lumbar spine demonstrates lateral neural foraminal stenosis/recess narrowing at L3.  There is straightening of the normal lumbar lordosis.  Otherwise there is no central canal stenosis.  No significant disc bulges.  MRI of the brain is unremarkable for any signs of demyelinating disease.  MRI of the C-spine is unremarkable.          Linda Minaya is a very pleasant  43 year old right-handed woman w/ a pmhx of thyroid nodules, hypertension, L3 neuroforaminal stenosis, chronic low back pain, migraine, left-sided action tremors, transient left hemiparesis/hemisensory loss, and left-sided carpal tunnel syndrome evaluated for return of her left-sided  tremors (now worse w/ pill rolling), neck pain, muscle pain, chronic low back pain, migraines, and chronic insomnia.  Her grandmother  and mother have Parkinson disease.     Patient reports that her handwriting is now shakier, her right hand.  She did not specifically mention any right-sided tremors, and none were appreciated on exam.  On exam she had a postural myoclonic tremor left hand.  No rigidity. She pérez not have sx of PD. Remainder of exam is nonfocal.    Etiology of her tremor is unclear.  one possibility she may have a myoclonic or dystonic tremor.  Treatment for dystonic tremor is the same as essential tremor.  Would avoid propranolol given her use of inhaler and ?possible asthma.  Second line medication is gabapentin, which he said has not helped her back pain in the past.  However she is never been on the prescribed dose (300 mg 3 times daily).  Hopefully this can help her tremor and improve her chronic low back pain.      Her migraines remain disabling.  She is having at least 12 headache days a month.  Sumatriptan did not provide relief, and she has been using Excedrin Migraine  as rescue.will switch her to Nurtec for rescue.  She is not on a prophylactic agent.  Consider using Topamax, but it may not be as effective for neuropathic pain.      Her low back pain is  MORE bothersome than her migraine pain, therefore we will start her on gabapentin.  Stressed importance of lifestyle changes to decrease her migraine frequency including maintaining hydration (specifically advised her to drink 88 ounces of water a day and to keep track of how much water she drinks), regular sleep schedule, and not skipping meals.  We will continue amitriptyline for insomnia.  Screen for RAYRAY, her [place] Sleepiness Scale score was 0.  In the future will refer to sleep psychology.  She is aware of the adverse effects of amitriptyline.      Will try Topamax for migraine (first line), tremors (third line) and neuropathic pain  (third line).  Add nurtec for rescue. Stop gabapentin (for tremors and neuropathic pain) and switch to lyrica (for tremors and neuropathic pain). Cont elavil. Offered doxazosin, which she declined.            1. Action tremor  - pregabalin 75 MG Oral Cap; 75mg BID x 3 days 75 mg AM & 150 mg PM x 3 days 150 mg  AM & 150 mg  PM x  3 days 150 mg  AM & 300 mg PM x 12 days; then will need a refill.  Dispense: 105 capsule; Refill: 0  - topiramate 25 MG Oral Tab; Take 1 tablet (25 mg total) by mouth 2 (two) times daily for 7 days, THEN 2 tablets (50 mg total) 2 (two) times daily.  Dispense: 346 tablet; Refill: 0    2. Migraine without aura and without status migrainosus, not intractable  - Amitriptyline HCl 150 MG Oral Tab; Take 1 tablet (150 mg total) by mouth nightly.  Dispense: 90 tablet; Refill: 3  - topiramate 25 MG Oral Tab; Take 1 tablet (25 mg total) by mouth 2 (two) times daily for 7 days, THEN 2 tablets (50 mg total) 2 (two) times daily.  Dispense: 346 tablet; Refill: 0  - Rimegepant Sulfate (NURTEC) 75 MG Oral Tablet Dispersible; Take 75 mg by mouth as needed. Take one tablet at onset of migraine.  Maximum dose in 24 hours is 1 tablet (75mg).  Dispense: 8 tablet; Refill: 11    3. Insomnia, unspecified type  - Amitriptyline HCl 150 MG Oral Tab; Take 1 tablet (150 mg total) by mouth nightly.  Dispense: 90 tablet; Refill: 3  -  NAVIGATOR  - SPECIALTY (OTHER) - EXTERNAL  - Home Sleep Apnea Test (Adult pt only) - Sleep consult required for Medicare pts  - General sleep study; Future    4. Paresthesias  - pregabalin 75 MG Oral Cap; 75mg BID x 3 days 75 mg AM & 150 mg PM x 3 days 150 mg  AM & 150 mg  PM x  3 days 150 mg  AM & 300 mg PM x 12 days; then will need a refill.  Dispense: 105 capsule; Refill: 0  - Physiatry Referral - In Network    5. Neuropathic pain  - pregabalin 75 MG Oral Cap; 75mg BID x 3 days 75 mg AM & 150 mg PM x 3 days 150 mg  AM & 150 mg  PM x  3 days 150 mg  AM & 300 mg PM x 12 days; then will  need a refill.  Dispense: 105 capsule; Refill: 0  - Amitriptyline HCl 150 MG Oral Tab; Take 1 tablet (150 mg total) by mouth nightly.  Dispense: 90 tablet; Refill: 3  - topiramate 25 MG Oral Tab; Take 1 tablet (25 mg total) by mouth 2 (two) times daily for 7 days, THEN 2 tablets (50 mg total) 2 (two) times daily.  Dispense: 346 tablet; Refill: 0  - Physiatry Referral - In Network    6. Myofascial pain  - Physiatry Referral - In Network    7. Snoring  - Home Sleep Apnea Test (Adult pt only) - Sleep consult required for Medicare pts                                 Education/Instructions given to: patient   Barriers to Learning: none  Content: Refer to note above.  Evaluation/Outcome: Verbalized understanding    This document is not intended to support charting by exception.  Sections left blank in a completed note should be presumed not to have been done.        Education and counseling provided to patient. Instructed patient to call my office or seek medical attention immediately if symptoms worsen.  Patient verbalized understanding of information given. All questions were answered. All side effects of drugs were discussed.              Return to clinic in: 1 month.  Patient needs to and in person visit.  Video visit will unlikely to provide adequate video quality to appropriately evaluate her tremors.    Dougie Lam DO  Vascular and General Neurology   04/02/24

## 2024-04-02 NOTE — PATIENT INSTRUCTIONS
Stop gabapentin. Take lyrica instead  Take topamax for tremors, headaches, and pain.  Take nurtec when a headache is coming on,  Take Topamax to prevent headaches  Get a sleep study

## 2024-04-05 ENCOUNTER — TELEPHONE (OUTPATIENT)
Age: 44
End: 2024-04-05

## 2024-04-12 ENCOUNTER — OFFICE VISIT (OUTPATIENT)
Dept: OBGYN | Age: 44
End: 2024-04-12

## 2024-04-12 ENCOUNTER — PREP FOR CASE (OUTPATIENT)
Dept: OBGYN | Age: 44
End: 2024-04-12

## 2024-04-12 ENCOUNTER — APPOINTMENT (OUTPATIENT)
Dept: OBGYN | Age: 44
End: 2024-04-12

## 2024-04-12 ENCOUNTER — TELEPHONE (OUTPATIENT)
Age: 44
End: 2024-04-12

## 2024-04-12 VITALS
BODY MASS INDEX: 38.83 KG/M2 | DIASTOLIC BLOOD PRESSURE: 94 MMHG | TEMPERATURE: 97.8 F | OXYGEN SATURATION: 96 % | SYSTOLIC BLOOD PRESSURE: 145 MMHG | HEART RATE: 113 BPM | WEIGHT: 210.98 LBS | HEIGHT: 62 IN

## 2024-04-12 DIAGNOSIS — Z12.31 SCREENING MAMMOGRAM FOR BREAST CANCER: ICD-10-CM

## 2024-04-12 DIAGNOSIS — Z12.4 ENCOUNTER FOR SCREENING FOR MALIGNANT NEOPLASM OF CERVIX: ICD-10-CM

## 2024-04-12 DIAGNOSIS — D50.0 IRON DEFICIENCY ANEMIA DUE TO CHRONIC BLOOD LOSS: ICD-10-CM

## 2024-04-12 DIAGNOSIS — Z01.419 ENCOUNTER FOR GYNECOLOGICAL EXAMINATION (GENERAL) (ROUTINE) WITHOUT ABNORMAL FINDINGS: Primary | ICD-10-CM

## 2024-04-12 DIAGNOSIS — N92.1 MENORRHAGIA WITH IRREGULAR CYCLE: Primary | ICD-10-CM

## 2024-04-12 DIAGNOSIS — R58 EXCESSIVE BLEEDING: ICD-10-CM

## 2024-04-12 DIAGNOSIS — N92.1 MENORRHAGIA WITH IRREGULAR CYCLE: ICD-10-CM

## 2024-04-12 DIAGNOSIS — Z98.890 HISTORY OF FEMALE STERILIZATION: ICD-10-CM

## 2024-04-12 DIAGNOSIS — R35.0 URINARY FREQUENCY: ICD-10-CM

## 2024-04-12 PROBLEM — E04.2 MULTINODULAR GOITER (NONTOXIC): Status: ACTIVE | Noted: 2024-04-12

## 2024-04-12 PROBLEM — M06.9 ARTHRITIS, RHEUMATOID  (CMD): Status: ACTIVE | Noted: 2024-03-01

## 2024-04-12 PROCEDURE — 81001 URINALYSIS AUTO W/SCOPE: CPT | Performed by: CLINICAL MEDICAL LABORATORY

## 2024-04-12 RX ORDER — PREDNISONE 10 MG/1
TABLET ORAL
COMMUNITY

## 2024-04-12 RX ORDER — FOLIC ACID 1 MG/1
1 TABLET ORAL
COMMUNITY
Start: 2024-03-11

## 2024-04-12 RX ORDER — POLYETHYLENE GLYCOL 3350, SODIUM CHLORIDE, SODIUM BICARBONATE, POTASSIUM CHLORIDE 420; 11.2; 5.72; 1.48 G/4L; G/4L; G/4L; G/4L
POWDER, FOR SOLUTION ORAL
COMMUNITY
Start: 2024-02-13

## 2024-04-12 RX ORDER — METHOTREXATE 2.5 MG/1
TABLET ORAL
COMMUNITY

## 2024-04-12 RX ORDER — TOPIRAMATE 25 MG/1
TABLET ORAL
COMMUNITY
Start: 2024-04-02 | End: 2024-07-01

## 2024-04-12 RX ORDER — RIMEGEPANT SULFATE 75 MG/75MG
75 TABLET, ORALLY DISINTEGRATING ORAL
COMMUNITY
Start: 2024-04-02 | End: 2024-05-02

## 2024-04-12 RX ORDER — MEDROXYPROGESTERONE ACETATE 150 MG/ML
150 INJECTION, SUSPENSION INTRAMUSCULAR ONCE
Status: COMPLETED | OUTPATIENT
Start: 2024-04-12 | End: 2024-04-12

## 2024-04-12 RX ORDER — AMLODIPINE BESYLATE 5 MG/1
5 TABLET ORAL DAILY
COMMUNITY

## 2024-04-12 RX ORDER — PREGABALIN 75 MG/1
CAPSULE ORAL
COMMUNITY
Start: 2024-04-02

## 2024-04-12 RX ADMIN — MEDROXYPROGESTERONE ACETATE 150 MG: 150 INJECTION, SUSPENSION INTRAMUSCULAR at 12:19

## 2024-04-13 LAB
APPEARANCE UR: CLEAR
BACTERIA #/AREA URNS HPF: ABNORMAL /HPF
BILIRUB UR QL STRIP: NEGATIVE
COLOR UR: YELLOW
GLUCOSE UR STRIP-MCNC: ABNORMAL MG/DL
HGB UR QL STRIP: ABNORMAL
HYALINE CASTS #/AREA URNS LPF: ABNORMAL /LPF
KETONES UR STRIP-MCNC: NEGATIVE MG/DL
LEUKOCYTE ESTERASE UR QL STRIP: NEGATIVE
MUCOUS THREADS URNS QL MICRO: PRESENT
NITRITE UR QL STRIP: NEGATIVE
PH UR STRIP: 7 [PH] (ref 5–7)
PROT UR STRIP-MCNC: 100 MG/DL
RBC #/AREA URNS HPF: ABNORMAL /HPF
SP GR UR STRIP: 1.02 (ref 1–1.03)
SQUAMOUS #/AREA URNS HPF: ABNORMAL /HPF
UROBILINOGEN UR STRIP-MCNC: 2 MG/DL
WBC #/AREA URNS HPF: ABNORMAL /HPF

## 2024-04-17 ENCOUNTER — HOSPITAL ENCOUNTER (OUTPATIENT)
Age: 44
End: 2024-04-17
Attending: OBSTETRICS & GYNECOLOGY | Admitting: OBSTETRICS & GYNECOLOGY

## 2024-04-18 ENCOUNTER — TELEPHONE (OUTPATIENT)
Dept: OBGYN | Age: 44
End: 2024-04-18

## 2024-04-20 DIAGNOSIS — R80.9 PROTEINURIA, UNSPECIFIED TYPE: Primary | ICD-10-CM

## 2024-04-24 ENCOUNTER — OFFICE VISIT (OUTPATIENT)
Dept: INTERNAL MEDICINE CLINIC | Facility: CLINIC | Age: 44
End: 2024-04-24
Payer: COMMERCIAL

## 2024-04-24 ENCOUNTER — TELEPHONE (OUTPATIENT)
Dept: INTERNAL MEDICINE CLINIC | Facility: CLINIC | Age: 44
End: 2024-04-24

## 2024-04-24 ENCOUNTER — NURSE ONLY (OUTPATIENT)
Dept: OBGYN | Age: 44
End: 2024-04-24

## 2024-04-24 ENCOUNTER — OFFICE VISIT (OUTPATIENT)
Dept: RHEUMATOLOGY | Facility: CLINIC | Age: 44
End: 2024-04-24
Payer: COMMERCIAL

## 2024-04-24 ENCOUNTER — LAB ENCOUNTER (OUTPATIENT)
Dept: LAB | Facility: HOSPITAL | Age: 44
End: 2024-04-24
Attending: INTERNAL MEDICINE
Payer: COMMERCIAL

## 2024-04-24 VITALS
WEIGHT: 212 LBS | BODY MASS INDEX: 40.02 KG/M2 | HEIGHT: 61 IN | DIASTOLIC BLOOD PRESSURE: 80 MMHG | HEART RATE: 130 BPM | SYSTOLIC BLOOD PRESSURE: 126 MMHG

## 2024-04-24 VITALS
BODY MASS INDEX: 40.02 KG/M2 | OXYGEN SATURATION: 98 % | WEIGHT: 212 LBS | HEIGHT: 61 IN | HEART RATE: 124 BPM | DIASTOLIC BLOOD PRESSURE: 80 MMHG | SYSTOLIC BLOOD PRESSURE: 128 MMHG

## 2024-04-24 DIAGNOSIS — M19.90 INFLAMMATORY ARTHRITIS: ICD-10-CM

## 2024-04-24 DIAGNOSIS — E66.01 CLASS 2 SEVERE OBESITY DUE TO EXCESS CALORIES WITH SERIOUS COMORBIDITY AND BODY MASS INDEX (BMI) OF 37.0 TO 37.9 IN ADULT (HCC): ICD-10-CM

## 2024-04-24 DIAGNOSIS — J45.20 MILD INTERMITTENT ASTHMA WITHOUT COMPLICATION (HCC): ICD-10-CM

## 2024-04-24 DIAGNOSIS — R00.0 TACHYCARDIA: ICD-10-CM

## 2024-04-24 DIAGNOSIS — Z01.818 PREOP EXAMINATION: Primary | ICD-10-CM

## 2024-04-24 DIAGNOSIS — F19.20 STEROID DEPENDENT (HCC): ICD-10-CM

## 2024-04-24 DIAGNOSIS — D25.9 UTERINE LEIOMYOMA, UNSPECIFIED LOCATION: ICD-10-CM

## 2024-04-24 DIAGNOSIS — M13.0 POLYARTHROPATHY: ICD-10-CM

## 2024-04-24 DIAGNOSIS — E87.6 HYPOKALEMIA: ICD-10-CM

## 2024-04-24 DIAGNOSIS — Z01.818 PREOP EXAMINATION: ICD-10-CM

## 2024-04-24 DIAGNOSIS — I10 ESSENTIAL HYPERTENSION: ICD-10-CM

## 2024-04-24 DIAGNOSIS — R70.0 ELEVATED SEDIMENTATION RATE: Primary | ICD-10-CM

## 2024-04-24 DIAGNOSIS — M25.50 POLYARTHRALGIA: ICD-10-CM

## 2024-04-24 DIAGNOSIS — M05.79 RHEUMATOID ARTHRITIS INVOLVING MULTIPLE SITES WITH POSITIVE RHEUMATOID FACTOR (HCC): Primary | ICD-10-CM

## 2024-04-24 DIAGNOSIS — M51.16 LUMBAR DISC HERNIATION WITH RADICULOPATHY: ICD-10-CM

## 2024-04-24 DIAGNOSIS — Z51.81 MEDICATION MONITORING ENCOUNTER: ICD-10-CM

## 2024-04-24 DIAGNOSIS — R80.9 PROTEINURIA, UNSPECIFIED TYPE: ICD-10-CM

## 2024-04-24 LAB
ALBUMIN SERPL-MCNC: 4.4 G/DL (ref 3.2–4.8)
ALBUMIN/GLOB SERPL: 1.4 {RATIO} (ref 1–2)
ALP LIVER SERPL-CCNC: 98 U/L
ALT SERPL-CCNC: 20 U/L
ANION GAP SERPL CALC-SCNC: 8 MMOL/L (ref 0–18)
AST SERPL-CCNC: 15 U/L (ref ?–34)
BASOPHILS # BLD AUTO: 0.09 X10(3) UL (ref 0–0.2)
BASOPHILS NFR BLD AUTO: 0.9 %
BILIRUB SERPL-MCNC: 0.2 MG/DL (ref 0.3–1.2)
BUN BLD-MCNC: 7 MG/DL (ref 9–23)
BUN/CREAT SERPL: 8.1 (ref 10–20)
CALCIUM BLD-MCNC: 9.4 MG/DL (ref 8.7–10.4)
CHLORIDE SERPL-SCNC: 107 MMOL/L (ref 98–112)
CO2 SERPL-SCNC: 26 MMOL/L (ref 21–32)
CREAT BLD-MCNC: 0.86 MG/DL
CRP SERPL-MCNC: 1.8 MG/DL (ref ?–1)
DEPRECATED RDW RBC AUTO: 41.1 FL (ref 35.1–46.3)
EGFRCR SERPLBLD CKD-EPI 2021: 85 ML/MIN/1.73M2 (ref 60–?)
EOSINOPHIL # BLD AUTO: 0.15 X10(3) UL (ref 0–0.7)
EOSINOPHIL NFR BLD AUTO: 1.4 %
ERYTHROCYTE [DISTWIDTH] IN BLOOD BY AUTOMATED COUNT: 16.8 % (ref 11–15)
ERYTHROCYTE [SEDIMENTATION RATE] IN BLOOD: 53 MM/HR
FASTING STATUS PATIENT QL REPORTED: YES
GLOBULIN PLAS-MCNC: 3.2 G/DL (ref 2.8–4.4)
GLUCOSE BLD-MCNC: 162 MG/DL (ref 70–99)
HCT VFR BLD AUTO: 35.2 %
HGB BLD-MCNC: 11.4 G/DL
IMM GRANULOCYTES # BLD AUTO: 0.03 X10(3) UL (ref 0–1)
IMM GRANULOCYTES NFR BLD: 0.3 %
LYMPHOCYTES # BLD AUTO: 2.34 X10(3) UL (ref 1–4)
LYMPHOCYTES NFR BLD AUTO: 22.5 %
MCH RBC QN AUTO: 22.6 PG (ref 26–34)
MCHC RBC AUTO-ENTMCNC: 32.4 G/DL (ref 31–37)
MCV RBC AUTO: 69.7 FL
MONOCYTES # BLD AUTO: 0.9 X10(3) UL (ref 0.1–1)
MONOCYTES NFR BLD AUTO: 8.7 %
NEUTROPHILS # BLD AUTO: 6.89 X10 (3) UL (ref 1.5–7.7)
NEUTROPHILS # BLD AUTO: 6.89 X10(3) UL (ref 1.5–7.7)
NEUTROPHILS NFR BLD AUTO: 66.2 %
OSMOLALITY SERPL CALC.SUM OF ELEC: 294 MOSM/KG (ref 275–295)
PLATELET # BLD AUTO: 499 10(3)UL (ref 150–450)
POTASSIUM SERPL-SCNC: 3.4 MMOL/L (ref 3.5–5.1)
PROT SERPL-MCNC: 7.6 G/DL (ref 5.7–8.2)
RBC # BLD AUTO: 5.05 X10(6)UL
SODIUM SERPL-SCNC: 141 MMOL/L (ref 136–145)
WBC # BLD AUTO: 10.4 X10(3) UL (ref 4–11)

## 2024-04-24 PROCEDURE — 3079F DIAST BP 80-89 MM HG: CPT | Performed by: INTERNAL MEDICINE

## 2024-04-24 PROCEDURE — 3008F BODY MASS INDEX DOCD: CPT | Performed by: INTERNAL MEDICINE

## 2024-04-24 PROCEDURE — 86225 DNA ANTIBODY NATIVE: CPT | Performed by: INTERNAL MEDICINE

## 2024-04-24 PROCEDURE — 85652 RBC SED RATE AUTOMATED: CPT | Performed by: INTERNAL MEDICINE

## 2024-04-24 PROCEDURE — 3074F SYST BP LT 130 MM HG: CPT | Performed by: INTERNAL MEDICINE

## 2024-04-24 PROCEDURE — 99214 OFFICE O/P EST MOD 30 MIN: CPT | Performed by: INTERNAL MEDICINE

## 2024-04-24 PROCEDURE — 86038 ANTINUCLEAR ANTIBODIES: CPT | Performed by: INTERNAL MEDICINE

## 2024-04-24 PROCEDURE — 80053 COMPREHEN METABOLIC PANEL: CPT | Performed by: INTERNAL MEDICINE

## 2024-04-24 PROCEDURE — 85025 COMPLETE CBC W/AUTO DIFF WBC: CPT | Performed by: INTERNAL MEDICINE

## 2024-04-24 PROCEDURE — 86140 C-REACTIVE PROTEIN: CPT | Performed by: INTERNAL MEDICINE

## 2024-04-24 RX ORDER — FOLIC ACID 1 MG/1
1 TABLET ORAL DAILY
Qty: 90 TABLET | Refills: 1 | Status: SHIPPED | OUTPATIENT
Start: 2024-04-24

## 2024-04-24 RX ORDER — METHOTREXATE 2.5 MG/1
15 TABLET ORAL WEEKLY
Qty: 78 TABLET | Refills: 1 | Status: SHIPPED | OUTPATIENT
Start: 2024-04-24

## 2024-04-24 RX ORDER — PREDNISONE 2.5 MG/1
2.5 TABLET ORAL 2 TIMES DAILY
Qty: 30 TABLET | Refills: 0 | Status: SHIPPED | OUTPATIENT
Start: 2024-04-24

## 2024-04-24 NOTE — TELEPHONE ENCOUNTER
----- Message from Og Davison MD sent at 4/24/2024 12:27 PM CDT -----  Have  take  2 metoprolol  - ( will go from 25 -to  50 mg )

## 2024-04-24 NOTE — PROGRESS NOTES
Preoperative Exam       Linda Minaya  43 year old female here for Preop clearance.    Surgery planned ;  total hysterectomy -     Patient Active Problem List   Diagnosis    Lumbar disc herniation with radiculopathy    Left thyroid nodule    Left flank tenderness    Tachycardia    Patellofemoral pain syndrome of left knee    Patellar tendinitis of left knee    Tenderness of left patella    Spinal stenosis of lumbar region with neurogenic claudication    Asthma (HCC)    Complicated migraine    Uterine leiomyoma    Low blood sugar    Isolated proteinuria    Encounter for female sterilization procedure    Lumbar trigger point syndrome    Myalgia    Lumbar spondylosis    Lumbar foraminal stenosis    Bulge of lumbar disc without myelopathy    DDD (degenerative disc disease), lumbosacral    Class 2 severe obesity due to excess calories with serious comorbidity and body mass index (BMI) of 37.0 to 37.9 in adult (HCC)    Migraine without aura and without status migrainosus, not intractable    Pharyngoesophageal dysphagia    Vitamin D deficiency    Other specified anemias    Metrorrhagia    Menorrhagia    UTI symptoms    Carpal tunnel syndrome of left wrist        Denies chest pain or dyspnea with activity ( is able to do greater than 4 METS )  history of  post  anesthesia tachycardia   Has  history of tachycardia  -  sinus -  w/u negative  recent  echo in feb 24 - normal - had holter in past  - average rate  114    Denies heart failure symptoms or history- no hx of CAD   No history of CVA    Denies any severe neck arthritis  Has no history of seizures, no bleeding disordes , no liver disease  Does not smoke,  no  sleep apnea known -  has  asthma - well controlled  mild intermittant   Denies significant alcohol   Recent labs  - mild  anemia  microcytic     Currently on prednisone for inflammatory arthritis has recently started methotrexate.    Blood pressure has been well-controlled on current medications.    She has  chronic low back issues.      Current Outpatient Medications:     pregabalin 75 MG Oral Cap, 75mg BID x 3 days 75 mg AM & 150 mg PM x 3 days 150 mg  AM & 150 mg  PM x  3 days 150 mg  AM & 300 mg PM x 12 days; then will need a refill., Disp: 105 capsule, Rfl: 0    Amitriptyline HCl 150 MG Oral Tab, Take 1 tablet (150 mg total) by mouth nightly., Disp: 90 tablet, Rfl: 3    topiramate 25 MG Oral Tab, Take 1 tablet (25 mg total) by mouth 2 (two) times daily for 7 days, THEN 2 tablets (50 mg total) 2 (two) times daily., Disp: 346 tablet, Rfl: 0    Rimegepant Sulfate (NURTEC) 75 MG Oral Tablet Dispersible, Take 75 mg by mouth as needed. Take one tablet at onset of migraine.  Maximum dose in 24 hours is 1 tablet (75mg)., Disp: 8 tablet, Rfl: 11    methotrexate 2.5 MG Oral Tab, Start 4 pills weekly for 2 weeks then increase to 6 pills weekly, Disp: 78 tablet, Rfl: 0    folic acid 1 MG Oral Tab, Take 1 tablet (1 mg total) by mouth daily., Disp: 90 tablet, Rfl: 0    predniSONE 10 MG Oral Tab, 30 mg daily x3 days then 20 mg daily x3 days then 10 mg daily x3 days then stay on 5 mg daily, Disp: 60 tablet, Rfl: 0    PEG 3350-KCl-Na Bicarb-NaCl (TRILYTE) 420 g Oral Recon Soln, Take prep as directed by gastro office. May substitute with Trilyte/generic equivalent if needed., Disp: 4000 mL, Rfl: 0    amLODIPine 5 MG Oral Tab, Take 1 tablet (5 mg total) by mouth daily., Disp: 90 tablet, Rfl: 3    fluticasone propionate 50 MCG/ACT Nasal Suspension, 2 sprays by Nasal route daily., Disp: 16 g, Rfl: 3    Omeprazole 40 MG Oral Capsule Delayed Release, Take 1 capsule (40 mg total) by mouth daily., Disp: , Rfl:     tranexamic acid 650 MG Oral Tab, Take 2 tablets (1,300 mg total) by mouth 3 (three) times daily., Disp: , Rfl:     albuterol 108 (90 Base) MCG/ACT Inhalation Aero Soln, Inhale 2 puffs into the lungs every 4 (four) hours as needed for Wheezing., Disp: 1 each, Rfl: 0    metoprolol succinate ER 25 MG Oral Tablet 24 Hr, Take 1 tablet  (25 mg total) by mouth daily., Disp: 90 tablet, Rfl: 0    Cholecalciferol (VITAMIN D) 1000 units Oral Tab, Vitamin D, Disp: , Rfl:        No Known Allergies       Past Surgical History:   Procedure Laterality Date    Arthroscopy of joint unlisted Left     knee x 2          5 yrs ago      Family History   Problem Relation Age of Onset    Hypertension Father     Diabetes Mother     Other (hepatitis) Mother         hepatitis C    No Known Problems Son     Colon Polyps Maternal Grandmother     Diabetes Maternal Grandmother     Heart Disorder Maternal Grandmother 40        atrial fibrillation, heart problems in cousins, some who smoke    Other (hepatitis C) Maternal Grandmother     Breast Cancer Maternal Aunt 40    Cancer Other 40        breast cancer    Other (breast cancer) Other         40's    Other (colon cancer) Other         40's      Social History     Socioeconomic History    Marital status:    Tobacco Use    Smoking status: Never    Smokeless tobacco: Never   Vaping Use    Vaping status: Never Used   Substance and Sexual Activity    Alcohol use: No     Alcohol/week: 0.0 standard drinks of alcohol     Comment: rare    Drug use: No   Other Topics Concern    Caffeine Concern Yes     Comment: Coffee: 1 cup daily    Exercise No   Social History Narrative    The patient does not use an assistive device..      The patient does not live in a home with stairs.           EXAM:     Vitals:    24 1106   BP: 128/80   Pulse: (!) 124   VITALSBody mass index is 40.06 kg/m².       She has chronic sinus tachycardia.  She looks cushingoid.    Heart regular - no murmur     Lungs clear     Ext no edema     Abdomen is obese    Derm ; no infection noted     Labs and EKG : Chemistries significant for glucose of 162 and potassium of 3.4, hemoglobin 11.4 platelet count 499        Linda was seen today for pre-op exam.    Diagnoses and all orders for this visit:    Preop examination    Essential  hypertension    Tachycardia    Inflammatory arthritis    Uterine leiomyoma, unspecified location    Class 2 severe obesity due to excess calories with serious comorbidity and body mass index (BMI) of 37.0 to 37.9 in adult (HCC)    Mild intermittent asthma without complication (HCC)    Lumbar disc herniation with radiculopathy    Hypokalemia             There is no history or symptoms of ischemic heart disease, heart failure, CVA.    She has chronic tachycardia workup has been negative with thyroid echo and Holter monitor.  Etiology is unclear.-    Does not have diabetes requiring insulin, does not have significant chronic kidney disease    On steroids for  RA  -  will need stress dose  steroid dosing  periop  - discussed with rheum       ASA Physical Status is 2     Is able to do(functional capacity)more than 4 METS        Is able to proceed with surgery if labs (hypokalemia ) and EKG are normal.    She has chronic sinus tachycardia.  I do not think this is a reason not to proceed with surgery.  Will increase her metoprolol from 25-50      Add;  k is up to  3.4   sugar  162  not fasting -  h/hstable     This note was prepared using Dragon Medical voice recognition dictation software and as a result, errors may occur. When identified, these errors have been corrected. While every attempt is made to correct errors during dictation, discrepancies may still exist       Og Davison MD

## 2024-04-24 NOTE — PROGRESS NOTES
Linda Minaya is a 43 year old female.    HPI:     Chief Complaint   Patient presents with    Rheumatoid Arthritis    Follow - Up     Methotrexate medication F/u        I had the pleasure of seeing Linda Minaya on 4/24/2024 for follow up Seronegative RA    Current Medications:  Methotrexate 6 pills weekly- started 3/11/2024  Prednisone 5 mg daily- started 3/11/2024  Gabapentin, Lyrica- for migraine's  Elavil- for sleep  Blood work:  Neg CTD screen, dsDNA, CCP, UA 5.1  RF 20, , CRP 2.4 mg/dL    Interval History:  This is a 42 yo F with hx of HTN, GERD, Asthma, Migraine's, Left knee arthroscopy referred for polyarthralgia's.  She reports having diffuse joint pain for many years starting in her 30s but worsened over the past 2 or 3 years.  She has pain and swelling in all her joints and her muscles.  Her hands will swell when she cannot close them.  She states that her body aches all the time.  She will take a hot shower in the morning which does help but then pain comes back right away.  She has to lay for 20 minutes in bed to loosen up.  She reports a rash on both legs in the calf region.  No history of psoriasis.  She also has dry eyes and dry mouth.  She follows with neurology Dr. Lam for migraines and tremors.  She was placed on gabapentin and muscle relaxers for some of her muscle pain but did not help.  Blood work showing elevated inflammation markers and RF of 20.  She states that her mom does have RA.  Was placed on prednisone 10 mg daily for a month by her PCP, joint pain and swelling did improve.    4/24/2024:  Presents for f/u of Seronegative RA  Now on methotrexate 6 pills weekly, FA daily and prednisone 5 mg daily  Having pain intermittently, hard to stand for more than 5 minutes mainly in the lower back and the knees  Hands are doing a little better  Over past 3 weeks more tingling in hands and feet  Still has muscle pain throughout the body  Feels like methotrexate and prednisone is helping  her pain  Going to have a hysterectomy May 6 at [facility]        HISTORY:  Past Medical History:    Anesthesia complication    increase heart rate after anesthesia    Arrhythmia    Asthma (HCC)    Back problem    Disorder of thyroid    thyroid nodules    Esophageal reflux    Essential hypertension    High blood pressure    Migraines    Osteoarthritis    Palpitations    Visual impairment    wears glasses      Social Hx Reviewed   Family Hx Reviewed     Medications (Active prior to today's visit):  Current Outpatient Medications   Medication Sig Dispense Refill    pregabalin 75 MG Oral Cap 75mg BID x 3 days 75 mg AM & 150 mg PM x 3 days 150 mg  AM & 150 mg  PM x  3 days 150 mg  AM & 300 mg PM x 12 days; then will need a refill. 105 capsule 0    Amitriptyline HCl 150 MG Oral Tab Take 1 tablet (150 mg total) by mouth nightly. 90 tablet 3    topiramate 25 MG Oral Tab Take 1 tablet (25 mg total) by mouth 2 (two) times daily for 7 days, THEN 2 tablets (50 mg total) 2 (two) times daily. 346 tablet 0    Rimegepant Sulfate (NURTEC) 75 MG Oral Tablet Dispersible Take 75 mg by mouth as needed. Take one tablet at onset of migraine.  Maximum dose in 24 hours is 1 tablet (75mg). 8 tablet 11    methotrexate 2.5 MG Oral Tab Start 4 pills weekly for 2 weeks then increase to 6 pills weekly 78 tablet 0    folic acid 1 MG Oral Tab Take 1 tablet (1 mg total) by mouth daily. 90 tablet 0    predniSONE 10 MG Oral Tab 30 mg daily x3 days then 20 mg daily x3 days then 10 mg daily x3 days then stay on 5 mg daily 60 tablet 0    PEG 3350-KCl-Na Bicarb-NaCl (TRILYTE) 420 g Oral Recon Soln Take prep as directed by gastro office. May substitute with Trilyte/generic equivalent if needed. 4000 mL 0    amLODIPine 5 MG Oral Tab Take 1 tablet (5 mg total) by mouth daily. 90 tablet 3    fluticasone propionate 50 MCG/ACT Nasal Suspension 2 sprays by Nasal route daily. 16 g 3    Omeprazole 40 MG Oral Capsule Delayed Release Take 1 capsule (40 mg total) by  mouth daily.      tranexamic acid 650 MG Oral Tab Take 2 tablets (1,300 mg total) by mouth 3 (three) times daily.      albuterol 108 (90 Base) MCG/ACT Inhalation Aero Soln Inhale 2 puffs into the lungs every 4 (four) hours as needed for Wheezing. 1 each 0    metoprolol succinate ER 25 MG Oral Tablet 24 Hr Take 1 tablet (25 mg total) by mouth daily. 90 tablet 0    Cholecalciferol (VITAMIN D) 1000 units Oral Tab Vitamin D       .cmed  Allergies:  No Known Allergies      ROS:   All other ROS are negative.     PHYSICAL EXAM:   GEN: AAOx3, NAD  HEENT: EOMI, PERRLA, no injection or icterus, oral mucosa moist, no oral lesions. No lymphadenopathy. No facial rash  CVS: RRR, no murmurs rubs or gallops. Equal 2+ distal pulses.   LUNGS: CTAB, no increased work of breathing  ABDOMEN:  soft NT/ND, +BS, no HSM  SKIN: No rashes or skin lesions. No nail findings  MSK:  TTP in paraspinal region and trapezius region, it has improved  No swelling no synovitis in her hands.  Now able to make a full fist  NEURO: Cranial nerves II-XII intact grossly. 5/5 strength throughout in both upper and lower extremities, sensation intact.  PSYCH: normal mood       LABS:     Component      Latest Ref Rng 11/3/2023 1/5/2024   Expanded COREY Antibody Screen, IGG      <0.7 ug/l 0.30      Anti-dsDNA antibody      <10 IU/mL 3.0      Connective Tissue Disease Screen Interpretation      Negative  Negative      RHEUMATOID FACTOR      <14 IU/mL 20 (H)      URIC ACID      3.1 - 7.8 mg/dL 5.1      C-Citrullinated Peptide IgG AB      0.0 - 6.9 U/mL 2.9      SED RATE      0 - 20 mm/Hr 48 (H)  101 (H)    C-REACTIVE PROTEIN      <1.00 mg/dL 1.90 (H)  2.40 (H)       Imaging:     XR Lumbar spine 2021:  Slight scoliosis with mild degenerative change lumbar spine.      MRI cervical spine 2019:  1. Minimal degenerative changes of the cervical spine with at most minimal foraminal narrowing, without further compromise of the neural structures.      2. No abnormal cord  signal intensity.      3. No intramedullary enhancement of the cervical cord is identified.      4. Left-sided perineural cysts are present at C6-C7 and C7-T1.      5. No acute osseous injury of the cervical spine.     ASSESSMENT/PLAN:     Seronegative RA  - She reports joint pain and swelling throughout her body, she has swelling in her hands where she cannot make a fist in the morning.  Blood work showing elevated ESR and CRP and RF of 20.  Mom has RA  - She is now methotrexate 6 pills weekly and prednisone 5 mg daily  - Joint pain in her hands have improved significantly.  Not able to make a fist  - Continues to have intermittent joint pain but better on methotrexate and prednisone  - She will continue methotrexate 6 pills weekly and folic acid daily  - She will slowly taper prednisone, she will take 2.5 mg daily for 3 weeks then 2.5 mg every other day for 2 weeks and then stop  - Blood work today     Myofascial pain  - She also has a lot of muscle pain throughout her body.  On Gabapentin and Lyrica for migraines, this fully will help her muscle pain    Migraines  - Following with neurology, she is on gabapentin and Lyrica for her migraines    Menorrhagia  - She is following with OB/GYN, she will be getting hysterectomy on May 6  - She has been on steroids since March 11, low-dose, she will be tapered off in 6 weeks but will need a stress dose steroids preop.  I will send my note to her OB/GYN and try contacting him  -Plan would be to give hydrocortisone 25 mg IV preoperatively     Pt will f/u in 3-4 mos     Shweta Monson MD  4/24/2024   12:37 PM

## 2024-04-24 NOTE — PATIENT INSTRUCTIONS
You were seen today for rheumatoid arthritis  Continue methotrexate 6 pills weekly and folic acid daily  For the prednisone take 2.5 mg daily for 3 weeks then 2.5 mg every other day for 3 weeks then stop  I will send a note to your OB/GYN as you will likely need stress dose steroids during your surgery  Blood work today  See me in August

## 2024-04-25 LAB
APPEARANCE UR: CLEAR
BILIRUB UR QL STRIP: NEGATIVE
COLOR UR: YELLOW
DSDNA IGG SERPL IA-ACNC: 1 IU/ML
ENA AB SER QL IA: 0.2 UG/L
ENA AB SER QL IA: NEGATIVE
GLUCOSE UR STRIP-MCNC: ABNORMAL MG/DL
HGB UR QL STRIP: NEGATIVE
KETONES UR STRIP-MCNC: NEGATIVE MG/DL
LEUKOCYTE ESTERASE UR QL STRIP: NEGATIVE
NITRITE UR QL STRIP: NEGATIVE
PH UR STRIP: 6 [PH] (ref 5–7)
PROT UR STRIP-MCNC: ABNORMAL MG/DL
SP GR UR STRIP: 1.01 (ref 1–1.03)
UROBILINOGEN UR STRIP-MCNC: 0.2 MG/DL

## 2024-04-29 ENCOUNTER — TELEPHONE (OUTPATIENT)
Dept: OBGYN | Age: 44
End: 2024-04-29

## 2024-04-30 ENCOUNTER — TELEPHONE (OUTPATIENT)
Dept: GASTROENTEROLOGY | Facility: CLINIC | Age: 44
End: 2024-04-30

## 2024-04-30 ENCOUNTER — E-ADVICE (OUTPATIENT)
Dept: OBGYN | Age: 44
End: 2024-04-30

## 2024-04-30 DIAGNOSIS — K82.4 GALLBLADDER POLYP: Primary | ICD-10-CM

## 2024-05-02 NOTE — TELEPHONE ENCOUNTER
Dr. Davison,    Patient is scheduled for colonoscopy/egd on 6/11/24. Please advise if ok to proceed from cardiac standpoint.     Thank you!

## 2024-05-06 NOTE — TELEPHONE ENCOUNTER
I contacted the pt and we discussed the plan. She is in agreement.   
Lesley, please see patients Mychart message.      12 month US recall (3/2025) entered.     
Nursing:  Please call recall for ultrasound 12 mos (3/2025).    Thanks,    Lesley  
Ultrasound gb 3/30/24    Impression  CONCLUSION: Gallbladder contains a 5 x 2 x 4 mm polyp, corresponding to a 4 x 3 x 3 mm polyp on prior ultrasound from 4/25/22.  Given its current size it is unclear whether there has been interval increase in size versus variation in measurement  associated with differences in patient positioning and imaging technique.  Recommend additional surveillance with follow-up gallbladder ultrasound in 12 months.       She denied a fhx gb ca.  LFTs normal 4/24/24    I contacted the pt to assess for abd pain.  No answer. Lmtcb.   Suspect size variation may be 2/2 imaging technique, however do recommend consult with gen surgery to consider ccy.    Reminder placed for surveillance imaging in 12 mos.    Pt my-charted results and recommendations.   
EENMT

## 2024-05-08 NOTE — H&P (VIEW-ONLY)
Chief complaint:   Chief Complaint   Patient presents with    Gallbladder     Referred by Dr. Og Davison for gallbladder polyp.       HPI: Thank you Dr. Davison, for the referral. Linda presents today for consult. She c/o upper postprandial abdominal pain, bloating, belching, gas which is worst after large fatty meals. She has a h/o a gallbladder polyp which has enlarged slightly on serial imaging. She takes prednisone, 30mg daily and she is on methotrexate.     She has no h/o melena, PUD, J, F,C.    3/20/204 US Gallbladder    Impression   CONCLUSION: Gallbladder contains a 5 x 2 x 4 mm polyp, corresponding to a 4 x 3 x 3 mm polyp on prior ultrasound from 22.      Past medical history:   Past Medical History:    Anesthesia complication    increase heart rate after anesthesia    Arrhythmia    Asthma (HCC)    Back problem    Disorder of thyroid    thyroid nodules    Esophageal reflux    Essential hypertension    High blood pressure    Migraines    Osteoarthritis    Palpitations    Visual impairment    wears glasses       Past surgical history:   Past Surgical History:   Procedure Laterality Date    Arthroscopy of joint unlisted Left     knee x 2          5 yrs ago       Allergies: No Known Allergies    Medications:   Current Outpatient Medications   Medication Sig Dispense Refill    methotrexate 2.5 MG Oral Tab Take 6 tablets (15 mg total) by mouth once a week. 78 tablet 1    folic acid 1 MG Oral Tab Take 1 tablet (1 mg total) by mouth daily. 90 tablet 1    predniSONE 2.5 MG Oral Tab Take 1 tablet (2.5 mg total) by mouth 2 (two) times daily. 30 tablet 0    pregabalin 75 MG Oral Cap 75mg BID x 3 days 75 mg AM & 150 mg PM x 3 days 150 mg  AM & 150 mg  PM x  3 days 150 mg  AM & 300 mg PM x 12 days; then will need a refill. 105 capsule 0    Amitriptyline HCl 150 MG Oral Tab Take 1 tablet (150 mg total) by mouth nightly. 90 tablet 3    topiramate 25 MG Oral Tab Take 1 tablet (25 mg total) by mouth 2 (two)  times daily for 7 days, THEN 2 tablets (50 mg total) 2 (two) times daily. 346 tablet 0    predniSONE 10 MG Oral Tab 30 mg daily x3 days then 20 mg daily x3 days then 10 mg daily x3 days then stay on 5 mg daily 60 tablet 0    PEG 3350-KCl-Na Bicarb-NaCl (TRILYTE) 420 g Oral Recon Soln Take prep as directed by gastro office. May substitute with Trilyte/generic equivalent if needed. 4000 mL 0    amLODIPine 5 MG Oral Tab Take 1 tablet (5 mg total) by mouth daily. 90 tablet 3    fluticasone propionate 50 MCG/ACT Nasal Suspension 2 sprays by Nasal route daily. 16 g 3    Omeprazole 40 MG Oral Capsule Delayed Release Take 1 capsule (40 mg total) by mouth daily.      tranexamic acid 650 MG Oral Tab Take 2 tablets (1,300 mg total) by mouth 3 (three) times daily.      albuterol 108 (90 Base) MCG/ACT Inhalation Aero Soln Inhale 2 puffs into the lungs every 4 (four) hours as needed for Wheezing. 1 each 0    metoprolol succinate ER 25 MG Oral Tablet 24 Hr Take 1 tablet (25 mg total) by mouth daily. 90 tablet 0    Cholecalciferol (VITAMIN D) 1000 units Oral Tab Vitamin D         Social history:   Social History     Socioeconomic History    Marital status:    Tobacco Use    Smoking status: Never    Smokeless tobacco: Never   Vaping Use    Vaping status: Never Used   Substance and Sexual Activity    Alcohol use: No     Alcohol/week: 0.0 standard drinks of alcohol     Comment: rare    Drug use: No        Family history:  Family History   Problem Relation Age of Onset    Hypertension Father     Diabetes Mother     Other (hepatitis) Mother         hepatitis C    No Known Problems Son     Colon Polyps Maternal Grandmother     Diabetes Maternal Grandmother     Heart Disorder Maternal Grandmother 40        atrial fibrillation, heart problems in cousins, some who smoke    Other (hepatitis C) Maternal Grandmother     Breast Cancer Maternal Aunt 40    Cancer Other 40        breast cancer    Other (breast cancer) Other         40's     Other (colon cancer) Other         40's        Review of Systems:   GENERAL: feels generally well  SKIN: no ulcerated or worrisome skin lesions  EYES:denies blurred vision or double vision  HEENT: denies new nasal congestion, sinus pain or ST  LUNGS: denies shortness of breath with exertion  CARDIOVASCULAR: denies chest pain on exertion  GI: no hematemesis, no BRBPR, no worsening heartburn  : no dysuria, no blood in urine, no difficulty urinating  MUSCULOSKELETAL: no new musculoskeletal complaints  NEURO: no persistent, recurrent  headaches  PSYCHE:no depression or anxiety  HEMATOLOGIC: no hx of blood dyscrasia  ENDOCRINE: no new endocrine problems  ALL/ASTHMA: no new hx of severe allergy or asthma  BACK: normal, no spinal deformity, no CVA tenderness    Physical examination:     Constitutional: appears well hydrated alert and responsive no acute distress noted  HEENT wnl, anicteric, PERRL, normocephalic, atraumatic  Neck supple, norm ROM, no JVD  L CTA B  H Reg rate  Abd soft, mild discomfort to RUQ palpation, ND, no masses, no hernias, no HSM.  Extr no c/c/e  Skin intact, no jaundice, no rashes, no lesions  Neuro grossly intact, no focal deficits, no tremors  Back no deformity, no CVA tnd.         Assessment and plan:  Diagnoses and all orders for this visit:    Gallbladder polyp    On prednisone therapy    On methotrexate therapy    Abdominal bloating    Right upper quadrant abdominal pain       We discussed the diagnosis, her medications and benefit of discontinuing them before surgery. She will discuss this with Dr. Davison and Dr. Monson.     Plan lap alessandra possible open, possible IOC  Low fat food, small meals, maintain hydration  S/S of concern discussed  We have discussed the surgical risks, benefits, alternatives, and expected recovery. All of the patient's questions have been answered to her satisfaction.    Thank you.    Roseann Vallejo MD  5/8/2024  1:16 PM

## 2024-05-09 ENCOUNTER — OFFICE VISIT (OUTPATIENT)
Dept: SURGERY | Facility: CLINIC | Age: 44
End: 2024-05-09
Payer: COMMERCIAL

## 2024-05-09 VITALS — WEIGHT: 212 LBS | BODY MASS INDEX: 40.02 KG/M2 | HEIGHT: 61 IN

## 2024-05-09 DIAGNOSIS — K82.4 GALLBLADDER POLYP: Primary | ICD-10-CM

## 2024-05-09 DIAGNOSIS — Z79.52 ON PREDNISONE THERAPY: ICD-10-CM

## 2024-05-09 DIAGNOSIS — R14.0 ABDOMINAL BLOATING: ICD-10-CM

## 2024-05-09 DIAGNOSIS — Z79.631 ON METHOTREXATE THERAPY: ICD-10-CM

## 2024-05-09 DIAGNOSIS — R10.11 RIGHT UPPER QUADRANT ABDOMINAL PAIN: ICD-10-CM

## 2024-05-09 PROCEDURE — 99244 OFF/OP CNSLTJ NEW/EST MOD 40: CPT | Performed by: SURGERY

## 2024-05-09 PROCEDURE — 3008F BODY MASS INDEX DOCD: CPT | Performed by: SURGERY

## 2024-05-17 NOTE — DISCHARGE INSTRUCTIONS
HOME INSTRUCTIONS  Keep steri strips on, change gauze if saturated as needed, may leave gauze and tegaderm dressing on as long as it is dry and comfortable.    Low fat small meals, avoid dairy for 1-2 weeks    Tylenol and Ibuprofen should be adequate for pain    No lifting > 10 lbs    No driving    See Rob Hernandez  in 1-2 weeks    May use ice pack on incision prn    Shower starting tomorrow, no submersion in bath/hot tub/pool       AMBSURG HOME CARE INSTRUCTIONS: POST-OP ANESTHESIA  The medication that you received for sedation or general anesthesia can last up to 24 hours. Your judgment and reflexes may be altered, even if you feel like your normal self.      We Recommend:   Do not drive any motor vehicle or bicycle   Avoid mowing the lawn, playing sports, or working with power tools/applicances (power saws, electric knives or mixers)   That you have someone stay with you on your first night home   Do not drink alcohol or take sleeping pills or tranquilizers   Do not sign legal documents within 24 hours of your procedure   If you had a nerve block for your surgery, take extra care not to put any pressure on your arm or hand for 24 hours    It is normal:  For you to have a sore throat if you had a breathing tube during surgery (while you were asleep!). The sore throat should get better within 48 hours. You can gargle with warm salt water (1/2 tsp in 4 oz warm water) or use a throat lozenge for comfort  To feel muscle aches or soreness especially in the abdomen, chest or neck. The achy feeling should go away in the next 24 hours  To feel weak, sleepy or \"wiped out\". Your should start feeling better in the next 24 hours.   To experience mild discomforts such as sore lip or tongue, headache, cramps, gas pains or a bloated feeling in your abdomen.   To experience mild back pain or soreness for a day or two if you had spinal or epidural anesthesia.   If you had laparoscopic surgery, to feel shoulder pain or discomfort  on the day of surgery.   For some patients to have nausea after surgery/anesthesia    If you feel nausea or experience vomiting:   Try to move around less.   Eat less than usual or drink only liquids until the next morning   Nausea should resolve in about 24 hours    If you have a problem when you are at home:    Call your surgeons office   Discharge Instructions: After Your Surgery  You’ve just had surgery. During surgery, you were given medicine called anesthesia to keep you relaxed and free of pain. After surgery, you may have some pain or nausea. This is common. Here are some tips for feeling better and getting well after surgery.   Going home  Your healthcare provider will show you how to take care of yourself when you go home. They'll also answer your questions. Have an adult family member or friend drive you home. For the first 24 hours after your surgery:   Don't drive or use heavy equipment.  Don't make important decisions or sign legal papers.  Take medicines as directed.  Don't drink alcohol.  Have someone stay with you, if needed. They can watch for problems and help keep you safe.  Be sure to go to all follow-up visits with your healthcare provider. And rest after your surgery for as long as your provider tells you to.   Coping with pain  If you have pain after surgery, pain medicine will help you feel better. Take it as directed, before pain becomes severe. Also, ask your healthcare provider or pharmacist about other ways to control pain. This might be with heat, ice, or relaxation. And follow any other instructions your surgeon or nurse gives you.      Stay on schedule with your medicine.     Tips for taking pain medicine  To get the best relief possible, remember these points:   Pain medicines can upset your stomach. Taking them with a little food may help.  Most pain relievers taken by mouth need at least 20 to 30 minutes to start to work.  Don't wait till your pain becomes severe before you take  your medicine. Try to time your medicine so that you can take it before starting an activity. This might be before you get dressed, go for a walk, or sit down for dinner.  Constipation is a common side effect of some pain medicines. Call your healthcare provider before taking any medicines such as laxatives or stool softeners to help ease constipation. Also ask if you should skip any foods. Drinking lots of fluids and eating foods such as fruits and vegetables that are high in fiber can also help. Remember, don't take laxatives unless your surgeon has prescribed them.  Drinking alcohol and taking pain medicine can cause dizziness and slow your breathing. It can even be deadly. Don't drink alcohol while taking pain medicine.  Pain medicine can make you react more slowly to things. Don't drive or run machinery while taking pain medicine.  Your healthcare provider may tell you to take acetaminophen to help ease your pain. Ask them how much you're supposed to take each day. Acetaminophen or other pain relievers may interact with your prescription medicines or other over-the-counter (OTC) medicines. Some prescription medicines have acetaminophen and other ingredients in them. Using both prescription and OTC acetaminophen for pain can cause you to accidentally overdose. Read the labels on your OTC medicines with care. This will help you to clearly know the list of ingredients, how much to take, and any warnings. It may also help you not take too much acetaminophen. If you have questions or don't understand the information, ask your pharmacist or healthcare provider to explain it to you before you take the OTC medicine.   Managing nausea  Some people have an upset stomach (nausea) after surgery. This is often because of anesthesia, pain, or pain medicine, less movement of food in the stomach, or the stress of surgery. These tips will help you handle nausea and eat healthy foods as you get better. If you were on a special  food plan before surgery, ask your healthcare provider if you should follow it while you get better. Check with your provider on how your eating should progress. It may depend on the surgery you had. These general tips may help:   Don't push yourself to eat. Your body will tell you when to eat and how much.  Start off with clear liquids and soup. They're easier to digest.  Next try semi-solid foods as you feel ready. These include mashed potatoes, applesauce, and gelatin.  Slowly move to solid foods. Don’t eat fatty, rich, or spicy foods at first.  Don't force yourself to have 3 large meals a day. Instead eat smaller amounts more often.  Take pain medicines with a small amount of solid food, such as crackers or toast. This helps prevent nausea.  When to call your healthcare provider  Call your healthcare provider right away if you have any of these:   You still have too much pain, or the pain gets worse, after taking the medicine. The medicine may not be strong enough. Or there may be a complication from the surgery.  You feel too sleepy, dizzy, or groggy. The medicine may be too strong.  Side effects such as nausea or vomiting. Your healthcare provider may advise taking other medicines to .  Skin changes such as rash, itching, or hives. This may mean you have an allergic reaction. Your provider may advise taking other medicines.  The incision looks different (for instance, part of it opens up).  Bleeding or fluid leaking from the incision site, and weren't told to expect that.  Fever of 100.4°F (38°C) or higher, or as directed by your provider.  Call 911  Call 911 right away if you have:   Trouble breathing  Facial swelling    If you have obstructive sleep apnea   You were given anesthesia medicine during surgery to keep you comfortable and free of pain. After surgery, you may have more apnea spells because of this medicine and other medicines you were given. The spells may last longer than normal.    At  home:  Keep using the continuous positive airway pressure (CPAP) device when you sleep. Unless your healthcare provider tells you not to, use it when you sleep, day or night. CPAP is a common device used to treat obstructive sleep apnea.  Talk with your provider before taking any pain medicine, muscle relaxants, or sedatives. Your provider will tell you about the possible dangers of taking these medicines.  Contact your provider if your sleeping changes a lot even when taking medicines as directed.  Stacey last reviewed this educational content on 10/1/2021  © 3452-3670 [employer]. All rights reserved. This information is not intended as a substitute for professional medical care. Always follow your healthcare professional's instructions.

## 2024-05-21 ENCOUNTER — HOSPITAL ENCOUNTER (OUTPATIENT)
Facility: HOSPITAL | Age: 44
Setting detail: HOSPITAL OUTPATIENT SURGERY
Discharge: HOME | End: 2024-05-21
Attending: SURGERY | Admitting: SURGERY

## 2024-05-21 ENCOUNTER — ANESTHESIA EVENT (OUTPATIENT)
Dept: SURGERY | Facility: HOSPITAL | Age: 44
End: 2024-05-21

## 2024-05-21 ENCOUNTER — ANESTHESIA (OUTPATIENT)
Dept: SURGERY | Facility: HOSPITAL | Age: 44
End: 2024-05-21

## 2024-05-21 VITALS
BODY MASS INDEX: 39.27 KG/M2 | WEIGHT: 208 LBS | HEART RATE: 115 BPM | RESPIRATION RATE: 16 BRPM | DIASTOLIC BLOOD PRESSURE: 67 MMHG | OXYGEN SATURATION: 95 % | HEIGHT: 61 IN | SYSTOLIC BLOOD PRESSURE: 138 MMHG | TEMPERATURE: 98 F

## 2024-05-21 DIAGNOSIS — K82.4 GALLBLADDER POLYP: ICD-10-CM

## 2024-05-21 DIAGNOSIS — R14.0 ABDOMINAL BLOATING: ICD-10-CM

## 2024-05-21 DIAGNOSIS — G89.18 POST-OP PAIN: Primary | ICD-10-CM

## 2024-05-21 DIAGNOSIS — Z79.631 ON METHOTREXATE THERAPY: ICD-10-CM

## 2024-05-21 DIAGNOSIS — R10.11 RIGHT UPPER QUADRANT ABDOMINAL PAIN: ICD-10-CM

## 2024-05-21 DIAGNOSIS — Z79.52 ON PREDNISONE THERAPY: ICD-10-CM

## 2024-05-21 LAB
ATRIAL RATE: 116 BPM
B-HCG UR QL: NEGATIVE
P AXIS: 71 DEGREES
P-R INTERVAL: 156 MS
Q-T INTERVAL: 328 MS
QRS DURATION: 76 MS
QTC CALCULATION (BEZET): 455 MS
R AXIS: 28 DEGREES
T AXIS: 82 DEGREES
VENTRICULAR RATE: 116 BPM

## 2024-05-21 PROCEDURE — 0FT44ZZ RESECTION OF GALLBLADDER, PERCUTANEOUS ENDOSCOPIC APPROACH: ICD-10-PCS | Performed by: SURGERY

## 2024-05-21 PROCEDURE — 47562 LAPAROSCOPIC CHOLECYSTECTOMY: CPT | Performed by: SURGERY

## 2024-05-21 RX ORDER — MORPHINE SULFATE 4 MG/ML
2 INJECTION, SOLUTION INTRAMUSCULAR; INTRAVENOUS EVERY 10 MIN PRN
Status: DISCONTINUED | OUTPATIENT
Start: 2024-05-21 | End: 2024-05-21

## 2024-05-21 RX ORDER — HYDROCODONE BITARTRATE AND ACETAMINOPHEN 5; 325 MG/1; MG/1
1 TABLET ORAL EVERY 6 HOURS PRN
Qty: 12 TABLET | Refills: 0 | Status: SHIPPED | OUTPATIENT
Start: 2024-05-21

## 2024-05-21 RX ORDER — DEXAMETHASONE SODIUM PHOSPHATE 4 MG/ML
VIAL (ML) INJECTION AS NEEDED
Status: DISCONTINUED | OUTPATIENT
Start: 2024-05-21 | End: 2024-05-21 | Stop reason: SURG

## 2024-05-21 RX ORDER — FAMOTIDINE 10 MG/ML
20 INJECTION, SOLUTION INTRAVENOUS ONCE
Status: COMPLETED | OUTPATIENT
Start: 2024-05-21 | End: 2024-05-21

## 2024-05-21 RX ORDER — HYDROMORPHONE HYDROCHLORIDE 1 MG/ML
0.6 INJECTION, SOLUTION INTRAMUSCULAR; INTRAVENOUS; SUBCUTANEOUS EVERY 5 MIN PRN
Status: DISCONTINUED | OUTPATIENT
Start: 2024-05-21 | End: 2024-05-21

## 2024-05-21 RX ORDER — LIDOCAINE HYDROCHLORIDE 10 MG/ML
INJECTION, SOLUTION EPIDURAL; INFILTRATION; INTRACAUDAL; PERINEURAL AS NEEDED
Status: DISCONTINUED | OUTPATIENT
Start: 2024-05-21 | End: 2024-05-21 | Stop reason: SURG

## 2024-05-21 RX ORDER — NALOXONE HYDROCHLORIDE 0.4 MG/ML
0.08 INJECTION, SOLUTION INTRAMUSCULAR; INTRAVENOUS; SUBCUTANEOUS AS NEEDED
Status: DISCONTINUED | OUTPATIENT
Start: 2024-05-21 | End: 2024-05-21

## 2024-05-21 RX ORDER — ACETAMINOPHEN 500 MG
1000 TABLET ORAL ONCE
Status: COMPLETED | OUTPATIENT
Start: 2024-05-21 | End: 2024-05-21

## 2024-05-21 RX ORDER — HYDROMORPHONE HYDROCHLORIDE 1 MG/ML
0.2 INJECTION, SOLUTION INTRAMUSCULAR; INTRAVENOUS; SUBCUTANEOUS EVERY 5 MIN PRN
Status: DISCONTINUED | OUTPATIENT
Start: 2024-05-21 | End: 2024-05-21

## 2024-05-21 RX ORDER — ONDANSETRON 2 MG/ML
4 INJECTION INTRAMUSCULAR; INTRAVENOUS ONCE AS NEEDED
Status: COMPLETED | OUTPATIENT
Start: 2024-05-21 | End: 2024-05-21

## 2024-05-21 RX ORDER — SODIUM CHLORIDE, SODIUM LACTATE, POTASSIUM CHLORIDE, CALCIUM CHLORIDE 600; 310; 30; 20 MG/100ML; MG/100ML; MG/100ML; MG/100ML
INJECTION, SOLUTION INTRAVENOUS CONTINUOUS
Status: DISCONTINUED | OUTPATIENT
Start: 2024-05-21 | End: 2024-05-21

## 2024-05-21 RX ORDER — PROCHLORPERAZINE EDISYLATE 5 MG/ML
5 INJECTION INTRAMUSCULAR; INTRAVENOUS ONCE AS NEEDED
Status: COMPLETED | OUTPATIENT
Start: 2024-05-21 | End: 2024-05-21

## 2024-05-21 RX ORDER — METOCLOPRAMIDE 10 MG/1
10 TABLET ORAL ONCE
Status: COMPLETED | OUTPATIENT
Start: 2024-05-21 | End: 2024-05-21

## 2024-05-21 RX ORDER — HYDROMORPHONE HYDROCHLORIDE 1 MG/ML
0.4 INJECTION, SOLUTION INTRAMUSCULAR; INTRAVENOUS; SUBCUTANEOUS EVERY 5 MIN PRN
Status: DISCONTINUED | OUTPATIENT
Start: 2024-05-21 | End: 2024-05-21

## 2024-05-21 RX ORDER — ONDANSETRON 2 MG/ML
INJECTION INTRAMUSCULAR; INTRAVENOUS AS NEEDED
Status: DISCONTINUED | OUTPATIENT
Start: 2024-05-21 | End: 2024-05-21 | Stop reason: SURG

## 2024-05-21 RX ORDER — FAMOTIDINE 20 MG/1
20 TABLET, FILM COATED ORAL ONCE
Status: COMPLETED | OUTPATIENT
Start: 2024-05-21 | End: 2024-05-21

## 2024-05-21 RX ORDER — METOPROLOL TARTRATE 1 MG/ML
INJECTION, SOLUTION INTRAVENOUS AS NEEDED
Status: DISCONTINUED | OUTPATIENT
Start: 2024-05-21 | End: 2024-05-21 | Stop reason: SURG

## 2024-05-21 RX ORDER — BUPIVACAINE HYDROCHLORIDE AND EPINEPHRINE 5; 5 MG/ML; UG/ML
INJECTION, SOLUTION PERINEURAL AS NEEDED
Status: DISCONTINUED | OUTPATIENT
Start: 2024-05-21 | End: 2024-05-21 | Stop reason: HOSPADM

## 2024-05-21 RX ORDER — MORPHINE SULFATE 4 MG/ML
4 INJECTION, SOLUTION INTRAMUSCULAR; INTRAVENOUS EVERY 10 MIN PRN
Status: DISCONTINUED | OUTPATIENT
Start: 2024-05-21 | End: 2024-05-21

## 2024-05-21 RX ORDER — MIDAZOLAM HYDROCHLORIDE 1 MG/ML
INJECTION INTRAMUSCULAR; INTRAVENOUS AS NEEDED
Status: DISCONTINUED | OUTPATIENT
Start: 2024-05-21 | End: 2024-05-21 | Stop reason: SURG

## 2024-05-21 RX ORDER — ROCURONIUM BROMIDE 10 MG/ML
INJECTION, SOLUTION INTRAVENOUS AS NEEDED
Status: DISCONTINUED | OUTPATIENT
Start: 2024-05-21 | End: 2024-05-21 | Stop reason: SURG

## 2024-05-21 RX ORDER — MORPHINE SULFATE 10 MG/ML
6 INJECTION, SOLUTION INTRAMUSCULAR; INTRAVENOUS EVERY 10 MIN PRN
Status: DISCONTINUED | OUTPATIENT
Start: 2024-05-21 | End: 2024-05-21

## 2024-05-21 RX ORDER — METOCLOPRAMIDE HYDROCHLORIDE 5 MG/ML
10 INJECTION INTRAMUSCULAR; INTRAVENOUS ONCE
Status: COMPLETED | OUTPATIENT
Start: 2024-05-21 | End: 2024-05-21

## 2024-05-21 RX ADMIN — ROCURONIUM BROMIDE 30 MG: 10 INJECTION, SOLUTION INTRAVENOUS at 14:15:00

## 2024-05-21 RX ADMIN — MIDAZOLAM HYDROCHLORIDE 2 MG: 1 INJECTION INTRAMUSCULAR; INTRAVENOUS at 14:09:00

## 2024-05-21 RX ADMIN — METOPROLOL TARTRATE 2.5 MG: 1 INJECTION, SOLUTION INTRAVENOUS at 14:16:00

## 2024-05-21 RX ADMIN — ONDANSETRON 4 MG: 2 INJECTION INTRAMUSCULAR; INTRAVENOUS at 14:10:00

## 2024-05-21 RX ADMIN — DEXAMETHASONE SODIUM PHOSPHATE 8 MG: 4 MG/ML VIAL (ML) INJECTION at 14:10:00

## 2024-05-21 RX ADMIN — LIDOCAINE HYDROCHLORIDE 50 MG: 10 INJECTION, SOLUTION EPIDURAL; INFILTRATION; INTRACAUDAL; PERINEURAL at 14:10:00

## 2024-05-21 RX ADMIN — Medication 2 MG: at 14:16:00

## 2024-05-21 RX ADMIN — SODIUM CHLORIDE, SODIUM LACTATE, POTASSIUM CHLORIDE, CALCIUM CHLORIDE: 600; 310; 30; 20 INJECTION, SOLUTION INTRAVENOUS at 14:08:00

## 2024-05-21 NOTE — ANESTHESIA PROCEDURE NOTES
Airway  Date/Time: 5/21/2024 2:11 PM  Urgency: elective    Airway not difficult    General Information and Staff    Patient location during procedure: OR  Anesthesiologist: Mickey Nash MD  Performed: anesthesiologist   Performed by: Mickey Nash MD  Authorized by: Mickey Nash MD      Indications and Patient Condition  Indications for airway management: anesthesia  Spontaneous Ventilation: absent  Sedation level: deep  Preoxygenated: yes  Patient position: sniffing  MILS not maintained throughout  Mask difficulty assessment: 0 - not attempted    Final Airway Details  Final airway type: endotracheal airway      Successful airway: ETT  Cuffed: yes   Successful intubation technique: direct laryngoscopy  Facilitating devices/methods: rapid sequence intubation  Endotracheal tube insertion site: oral  Blade: Tramaine  Blade size: #4  ETT size (mm): 7.0    Cormack-Lehane Classification: grade I - full view of glottis  Placement verified by: capnometry   Cuff volume (mL): 5  Measured from: lips  ETT to lips (cm): 22  Number of attempts at approach: 1  Number of other approaches attempted: 0

## 2024-05-21 NOTE — ANESTHESIA POSTPROCEDURE EVALUATION
Patient: Linda HURST Minaya    Procedure Summary       Date: 05/21/24 Room / Location: [facility] MAIN OR  / [facility] MAIN OR    Anesthesia Start: 1407 Anesthesia Stop: 1534    Procedure: Laparoscopic cholecystectomy Diagnosis:       Gallbladder polyp      On prednisone therapy      On methotrexate therapy      Abdominal bloating      Right upper quadrant abdominal pain      (Gallbladder polyp [K82.4]On prednisone therapy [Z79.52]On methotrexate therapy [Z79.631]Abdominal bloating [R14.0]Right upper quadrant abdominal pain [R10.11])    Surgeons: Roseann Vallejo MD Anesthesiologist: Mickey Nash MD    Anesthesia Type: general ASA Status: 3            Anesthesia Type: general    Vitals Value Taken Time   /97 05/21/24 1531   Temp 97.1 05/21/24 1534   Pulse 114 05/21/24 1534   Resp 24 05/21/24 1534   SpO2 93 % 05/21/24 1534   Vitals shown include unfiled device data.    [facility] AN Post Evaluation:   Patient Evaluated in PACU  Patient Participation: complete - patient participated  Level of Consciousness: awake and alert  Pain Score: 0  Pain Management: adequate  Airway Patency:patent  Yes    Nausea/Vomiting: none  Cardiovascular Status: acceptable  Respiratory Status: acceptable  Postoperative Hydration acceptable      Mickey Nash MD  5/21/2024 3:34 PM

## 2024-05-21 NOTE — ANESTHESIA PREPROCEDURE EVALUATION
Anesthesia PreOp Note    HPI:     Linda Minaya is a 44 year old female who presents for preoperative consultation requested by: Roseann Vallejo MD    Date of Surgery: 5/21/2024    Procedure(s):  Laparoscopic cholecystectomy, possible open cholecystectomy  Indication: Gallbladder polyp [K82.4]  On prednisone therapy [Z79.52]  On methotrexate therapy [Z79.631]  Abdominal bloating [R14.0]  Right upper quadrant abdominal pain [R10.11]    Relevant Problems   No relevant active problems       NPO:                         History Review:  Patient Active Problem List    Diagnosis Date Noted    Carpal tunnel syndrome of left wrist 06/19/2023    UTI symptoms 02/09/2023    Menorrhagia 09/21/2022    Vitamin D deficiency 05/11/2022    Other specified anemias 05/11/2022    Metrorrhagia 05/11/2022    Pharyngoesophageal dysphagia 04/11/2022    Migraine without aura and without status migrainosus, not intractable 02/11/2022    Lumbar trigger point syndrome 09/01/2021    Myalgia 09/01/2021    Lumbar spondylosis 09/01/2021    Lumbar foraminal stenosis 09/01/2021    Bulge of lumbar disc without myelopathy 09/01/2021    DDD (degenerative disc disease), lumbosacral 09/01/2021    Class 2 severe obesity due to excess calories with serious comorbidity and body mass index (BMI) of 37.0 to 37.9 in adult (HCC) 09/01/2021    Encounter for female sterilization procedure 10/16/2020    Isolated proteinuria 05/21/2020    Low blood sugar 05/09/2020    Asthma ([facility]) 09/12/2019    Complicated migraine 09/12/2019    Spinal stenosis of lumbar region with neurogenic claudication 08/24/2019    Patellofemoral pain syndrome of left knee 06/12/2019    Patellar tendinitis of left knee 06/12/2019    Tenderness of left patella 06/12/2019    Tachycardia 06/08/2019    Left flank tenderness 09/29/2018    Uterine leiomyoma 08/30/2018    Lumbar disc herniation with radiculopathy 08/19/2018    Left thyroid nodule 08/19/2018       Past Medical History:     Anesthesia complication    increase heart rate after anesthesia    Arrhythmia    TACHYCARDIA    Asthma (HCC)    Back problem    Disorder of thyroid    thyroid nodules    Esophageal reflux    Essential hypertension    High blood pressure    Migraines    Osteoarthritis    RA    Palpitations    Problems with swallowing    Visual impairment    GLASSES       Past Surgical History:   Procedure Laterality Date    Arthroscopy of joint unlisted Left     knee x 2          5 yrs ago    Tubal ligation             No medications prior to admission.     No current Epic-ordered facility-administered medications on file.     Current Outpatient Medications Ordered in Epic   Medication Sig Dispense Refill    folic acid 1 MG Oral Tab Take 1 tablet (1 mg total) by mouth daily. 90 tablet 1    pregabalin 75 MG Oral Cap 75mg BID x 3 days 75 mg AM & 150 mg PM x 3 days 150 mg  AM & 150 mg  PM x  3 days 150 mg  AM & 300 mg PM x 12 days; then will need a refill. 105 capsule 0    Amitriptyline HCl 150 MG Oral Tab Take 1 tablet (150 mg total) by mouth nightly. 90 tablet 3    amLODIPine 5 MG Oral Tab Take 1 tablet (5 mg total) by mouth daily. 90 tablet 3    tranexamic acid 650 MG Oral Tab Take 2 tablets (1,300 mg total) by mouth 3 (three) times daily.      albuterol 108 (90 Base) MCG/ACT Inhalation Aero Soln Inhale 2 puffs into the lungs every 4 (four) hours as needed for Wheezing. 1 each 0    metoprolol succinate ER 25 MG Oral Tablet 24 Hr Take 1 tablet (25 mg total) by mouth daily. 90 tablet 0    Cholecalciferol (VITAMIN D) 1000 units Oral Tab Vitamin D      methotrexate 2.5 MG Oral Tab Take 6 tablets (15 mg total) by mouth once a week. (Patient not taking: Reported on 2024) 78 tablet 1    predniSONE 2.5 MG Oral Tab Take 1 tablet (2.5 mg total) by mouth 2 (two) times daily. (Patient not taking: Reported on 2024) 30 tablet 0    topiramate 25 MG Oral Tab Take 1 tablet (25 mg total) by mouth 2 (two) times daily for 7 days,  THEN 2 tablets (50 mg total) 2 (two) times daily. (Patient not taking: Reported on 5/17/2024) 346 tablet 0    predniSONE 10 MG Oral Tab 30 mg daily x3 days then 20 mg daily x3 days then 10 mg daily x3 days then stay on 5 mg daily (Patient not taking: Reported on 5/17/2024) 60 tablet 0    PEG 3350-KCl-Na Bicarb-NaCl (TRILYTE) 420 g Oral Recon Soln Take prep as directed by gastro office. May substitute with Trilyte/generic equivalent if needed. 4000 mL 0    fluticasone propionate 50 MCG/ACT Nasal Suspension 2 sprays by Nasal route daily. (Patient not taking: Reported on 5/17/2024) 16 g 3    Omeprazole 40 MG Oral Capsule Delayed Release Take 1 capsule (40 mg total) by mouth daily. (Patient not taking: Reported on 5/17/2024)         No Known Allergies    Family History   Problem Relation Age of Onset    Hypertension Father     Diabetes Mother     Other (hepatitis) Mother         hepatitis C    No Known Problems Son     Colon Polyps Maternal Grandmother     Diabetes Maternal Grandmother     Heart Disorder Maternal Grandmother 40        atrial fibrillation, heart problems in cousins, some who smoke    Other (hepatitis C) Maternal Grandmother     Breast Cancer Maternal Aunt 40    Cancer Other 40        breast cancer    Other (breast cancer) Other         40's    Other (colon cancer) Other         40's     Social History     Socioeconomic History    Marital status:    Tobacco Use    Smoking status: Never    Smokeless tobacco: Never   Vaping Use    Vaping status: Never Used   Substance and Sexual Activity    Alcohol use: No     Comment: RARE    Drug use: No   Other Topics Concern    Caffeine Concern Yes     Comment: Coffee: 1 cup daily    Exercise No       Available pre-op labs reviewed.  Lab Results   Component Value Date    WBC 10.4 04/24/2024    RBC 5.05 04/24/2024    HGB 11.4 (L) 04/24/2024    HCT 35.2 04/24/2024    MCV 69.7 (L) 04/24/2024    MCH 22.6 (L) 04/24/2024    MCHC 32.4 04/24/2024    RDW 16.8 (H)  04/24/2024    .0 (H) 04/24/2024     Lab Results   Component Value Date     04/24/2024    K 3.4 (L) 04/24/2024     04/24/2024    CO2 26.0 04/24/2024    BUN 7 (L) 04/24/2024    CREATSERUM 0.86 04/24/2024     (H) 04/24/2024    CA 9.4 04/24/2024          Vital Signs:  Body mass index is 40.25 kg/m².   height is 1.549 m (5' 1\") and weight is 96.6 kg (213 lb).   Vitals:    05/17/24 0915   Weight: 96.6 kg (213 lb)   Height: 1.549 m (5' 1\")        Anesthesia Evaluation     Patient summary reviewed and Nursing notes reviewed    History of anesthetic complications   Airway   Mallampati: II  TM distance: >3 FB  Neck ROM: full  Dental      Pulmonary - normal exam   (+) asthma  Cardiovascular - normal exam  Exercise tolerance: good  (+) hypertension    Neuro/Psych    (+)  neuromuscular disease,        Comments: Lumbar radiculopathy    GI/Hepatic/Renal    (+) GERD    Endo/Other    (+) hypothyroidism  Abdominal  - normal exam                 Anesthesia Plan:   ASA:  3  Plan:   General  Airway:  ETT  Informed Consent Plan and Risks Discussed With:  Patient  Use of Blood Products Discussed With:  Patient  Blood Product Use Consented        I have informed Linda Minaya and/or legal guardian or family member of the nature of the anesthetic plan, benefits, risks including possible dental damage if relevant, major complications, and any alternative forms of anesthetic management.   All of the patient's questions were answered to the best of my ability. The patient desires the anesthetic management as planned.  Mickey Nash MD  5/21/2024 10:56 AM  Present on Admission:  **None**

## 2024-05-21 NOTE — INTERVAL H&P NOTE
Pre-op Diagnosis: Gallbladder polyp [K82.4]  On prednisone therapy [Z79.52]  On methotrexate therapy [Z79.631]  Abdominal bloating [R14.0]  Right upper quadrant abdominal pain [R10.11]    The above referenced H&P was reviewed by Roseann Vallejo MD on 5/21/2024, the patient was examined and no significant changes have occurred in the patient's condition since the H&P was performed.  I discussed with the patient and/or legal representative the potential benefits, risks and side effects of this procedure; the likelihood of the patient achieving goals; and potential problems that might occur during recuperation.  I discussed reasonable alternatives to the procedure, including risks, benefits and side effects related to the alternatives and risks related to not receiving this procedure.  We will proceed with procedure as planned.

## 2024-05-21 NOTE — OPERATIVE REPORT
Patient Name:  Linda Minaya  MR:  [id number]  :  1980  DOS:  23    Preop Dx:  Gallbladder polyp [K82.4]  On prednisone therapy [Z79.52]  On methotrexate therapy [Z79.631]  Abdominal bloating [R14.0]  Right upper quadrant abdominal pain [R10.11]  Postop Dx:  Gallbladder polyp [K82.4]On prednisone therapy [Z79.52]On methotrexate therapy [Z79.631]Abdominal bloating [R14.0]Right upper quadrant abdominal pain [R10.11]  Procedure:  Laparoscopic cholecystectomy  Surgeon:  Roseann Vallejo MD  Surgical Assistant.: Brad Chavez  PA: Rob Hernandez PA-C, CLARK  EBL: No data recorded  Complication:  None    INDICATION:  Pt is a 44 year old female who with Gallbladder polyp [K82.4]  On prednisone therapy [Z79.52]  On methotrexate therapy [Z79.631]  Abdominal bloating [R14.0]  Right upper quadrant abdominal pain [R10.11] who is scheduled for a Laparoscopic cholecystectomy.    CONSENT:  An informed consent discussion was held with the patient regarding the nature of Gallbladder polyp [K82.4]  On prednisone therapy [Z79.52]  On methotrexate therapy [Z79.631]  Abdominal bloating [R14.0]  Right upper quadrant abdominal pain [R10.11], the treatment options and the details of the procedure.  The risks including but not limited to bleeding, wound infection, intra-abdominal infection, injury to the liver, colon, small intestine, pancreas, stomach, common bile duct, incomplete resection, cystic duct stump leak, retained stone and incisional hernia were discussed.  The patient expressed understanding and want to proceed with the planned procedure.    TECHNIQUE:  The patient was taken to the OR and placed in supine position.  General anesthesia was established and the abdomen was prepped in standard fashion.  Pneumoperitoneum was obtained using open technique through a supra-umbilical incision.  A 12-mm trocar was inserted under direct visualization and no injury occurred.  Examination of the abdomen showed a  thickened and inflamed appearing gallbladder consistent with Gallbladder polyp [K82.4]  On prednisone therapy [Z79.52]  On methotrexate therapy [Z79.631]  Abdominal bloating [R14.0]  Right upper quadrant abdominal pain [R10.11].  Three 5-mm trocars were placed in the epigastric and right abdomen.  The patient was placed in reverse Trendelenburg position.  The gallbladder was grasped and retracted cephalad and to the right.  The peritoneum along the medial and lateral aspect of the gallbladder/liver edge were freed with the [place] dissector, small lymphatics were divided using the hook cautery.  The lower 1/3 of the gallbladder was dissected from the liver.  Two structures, identified as the cystic duct and artery, were visualized circumferentially with the 30 degree lens and noted to be entering the infundibulum, thus obtaining a critical view of safety.  The cystic duct and artery were clipped and divided.  The gallbladder was detached from the liver using hook cautery and delivered from the abdomen using an endocatch bag.  The abdominal cavity was irrigated with saline and found to be hemostatic.  The trocars were removed under direct visualization and no port site bleeding was seen.  The supra-umbilical fascia was closed using 0 vicryl.  The skin incisions were closed using 4-0 vicryl.  Sterile dressings were applied.  All instrument and sponge counts were correct.  I was present during the entire procedure and performed the operation.

## 2024-06-03 ENCOUNTER — TELEPHONE (OUTPATIENT)
Facility: CLINIC | Age: 44
End: 2024-06-03

## 2024-06-03 ENCOUNTER — OFFICE VISIT (OUTPATIENT)
Dept: SURGERY | Facility: CLINIC | Age: 44
End: 2024-06-03
Payer: COMMERCIAL

## 2024-06-03 VITALS — HEIGHT: 61 IN | BODY MASS INDEX: 39.27 KG/M2 | WEIGHT: 208 LBS

## 2024-06-03 DIAGNOSIS — Z98.890 POST-OPERATIVE STATE: Primary | ICD-10-CM

## 2024-06-03 PROCEDURE — 99024 POSTOP FOLLOW-UP VISIT: CPT | Performed by: SURGERY

## 2024-06-03 PROCEDURE — 3008F BODY MASS INDEX DOCD: CPT | Performed by: SURGERY

## 2024-06-03 NOTE — PROGRESS NOTES
S:  Linda Minaya is a 44 year old female sp Lap Nicolasa  Doing well, tolerating a general diet, generally normal bowel movements, no calf tenderness nor lower extremity edema, no shortness of breath, no chest pain.       O:  Ht 5' 1\" (1.549 m)   Wt 208 lb (94.3 kg)   LMP 05/06/2024 (Within Days)   BMI 39.30 kg/m²   GEN:  No acute distress  L: nonlabored respirations, CTA  H: reg rate  Abd:  Soft, NT,ND, incisions C/D/I, no erythema.  Extr: No edema, no calf tenderness, neg Milena's sign    Path:  Reviewed w pt    Assessment   1. Post-operative state        Doing well sp Lap Nicolasa.  Continue to avoid heavy lifting for another month.  Maintain a low fat diet.  Maintain good hydration.  F/u prn.         Roseann Vallejo MD

## 2024-06-03 NOTE — TELEPHONE ENCOUNTER
Romy Del Angel called from PAT/Karmen    Pt had a laparoscopic cholecystectomy on 05/21/2024    She needs to reschedule her colonoscopy scheduled for 06/11/2024 w/Dr Karthik Nunez removed from [facility]. PAT already cancelled the pt

## 2024-07-01 ENCOUNTER — OFFICE VISIT (OUTPATIENT)
Dept: ENDOCRINOLOGY CLINIC | Facility: CLINIC | Age: 44
End: 2024-07-01
Payer: COMMERCIAL

## 2024-07-01 VITALS
SYSTOLIC BLOOD PRESSURE: 130 MMHG | WEIGHT: 211.38 LBS | BODY MASS INDEX: 39.91 KG/M2 | HEART RATE: 110 BPM | HEIGHT: 61 IN | DIASTOLIC BLOOD PRESSURE: 80 MMHG

## 2024-07-01 DIAGNOSIS — E04.1 THYROID NODULE: Primary | ICD-10-CM

## 2024-07-01 PROCEDURE — 3079F DIAST BP 80-89 MM HG: CPT | Performed by: INTERNAL MEDICINE

## 2024-07-01 PROCEDURE — 3008F BODY MASS INDEX DOCD: CPT | Performed by: INTERNAL MEDICINE

## 2024-07-01 PROCEDURE — 3075F SYST BP GE 130 - 139MM HG: CPT | Performed by: INTERNAL MEDICINE

## 2024-07-01 PROCEDURE — 99213 OFFICE O/P EST LOW 20 MIN: CPT | Performed by: INTERNAL MEDICINE

## 2024-07-01 NOTE — PROGRESS NOTES
Return Office Visit     CHIEF COMPLAINT:    MNG      HISTORY OF PRESENT ILLNESS:  Linda Minaya is a 44 year old female who presents for follow up for thyroid nodules  These have been present for some time  FNA right thyroid nodule in 2018: benign  Personal or Family history of thyroid cancer: denies  Personal or family history of MEN: denies  Exposure to head and neck radiation before age 19: denies  Compressive symptoms (difficulty in breathing or swallowing): no breathing problems. However, she continues to report a sensation of food getting stuck in her throat at times and she needs to drink water to push it forwards.  She denies any episodes of choking    CURRENT MEDICATION:    Current Outpatient Medications   Medication Sig Dispense Refill    HYDROcodone-acetaminophen 5-325 MG Oral Tab Take 1 tablet by mouth every 6 (six) hours as needed. 12 tablet 0    methotrexate 2.5 MG Oral Tab Take 6 tablets (15 mg total) by mouth once a week. (Patient not taking: Reported on 5/17/2024) 78 tablet 1    folic acid 1 MG Oral Tab Take 1 tablet (1 mg total) by mouth daily. 90 tablet 1    predniSONE 2.5 MG Oral Tab Take 1 tablet (2.5 mg total) by mouth 2 (two) times daily. (Patient not taking: Reported on 5/17/2024) 30 tablet 0    pregabalin 75 MG Oral Cap 75mg BID x 3 days 75 mg AM & 150 mg PM x 3 days 150 mg  AM & 150 mg  PM x  3 days 150 mg  AM & 300 mg PM x 12 days; then will need a refill. 105 capsule 0    Amitriptyline HCl 150 MG Oral Tab Take 1 tablet (150 mg total) by mouth nightly. 90 tablet 3    topiramate 25 MG Oral Tab Take 1 tablet (25 mg total) by mouth 2 (two) times daily for 7 days, THEN 2 tablets (50 mg total) 2 (two) times daily. (Patient not taking: Reported on 5/17/2024) 346 tablet 0    predniSONE 10 MG Oral Tab 30 mg daily x3 days then 20 mg daily x3 days then 10 mg daily x3 days then stay on 5 mg daily (Patient not taking: Reported on 5/17/2024) 60 tablet 0    PEG 3350-KCl-Na Bicarb-NaCl (TRILYTE) 420 g  Oral Recon Soln Take prep as directed by gastro office. May substitute with Trilyte/generic equivalent if needed. 4000 mL 0    amLODIPine 5 MG Oral Tab Take 1 tablet (5 mg total) by mouth daily. 90 tablet 3    fluticasone propionate 50 MCG/ACT Nasal Suspension 2 sprays by Nasal route daily. (Patient not taking: Reported on 5/17/2024) 16 g 3    Omeprazole 40 MG Oral Capsule Delayed Release Take 1 capsule (40 mg total) by mouth daily.      tranexamic acid 650 MG Oral Tab Take 2 tablets (1,300 mg total) by mouth 3 (three) times daily.      albuterol 108 (90 Base) MCG/ACT Inhalation Aero Soln Inhale 2 puffs into the lungs every 4 (four) hours as needed for Wheezing. 1 each 0    metoprolol succinate ER 25 MG Oral Tablet 24 Hr Take 1 tablet (25 mg total) by mouth daily. 90 tablet 0    Cholecalciferol (VITAMIN D) 1000 units Oral Tab Vitamin D           ALLERGY:  No Known Allergies    PAST MEDICAL, SOCIAL AND FAMILY HISTORY:  See past medical history marked as reviewed.  See past surgical history marked as reviewed.  See past family history marked as reviewed.  See past social history marked as reviewed.    ASSESSMENTS:     REVIEW OF SYSTEMS:  Constitutional: Negative for: weight change, fever, fatigue, cold/heat intolerance  Eyes: Negative for:  Visual changes, proptosis, blurring  ENT: as above  Respiratory: Negative for:  dyspnea, cough  Cardiovascular: Negative for:  chest pain, palpitations, orthopnea  GI: Negative for:  abdominal pain, nausea, vomiting, diarrhea, constipation, bleeding  Neurology: Negative for: headache, numbness, weakness  Genito-Urinary: Negative for: dysuria, frequency  Psychiatric: Negative for:  depression, anxiety  Hematology/Lymphatics: Negative for: bruising, lower extremity edema  Endocrine: Negative for: polyuria, polydypsia  Skin: Negative for: rash, blister, cellulitis,       PHYSICAL EXAM:     Vitals reviewed    General Appearance:  alert, well developed, in no acute distress  Head:  Atraumatic  Eyes:  normal conjunctivae, sclera., normal sclera and normal pupils  Throat/Neck: normal sound to voice. Normal hearing, normal speech, + thyroid gland palpated  Respiratory:  Speaking in full sentences, non-labored. no increased work of breathing, no audible wheezing    Neuro: motor grossly intact, moving all extremities without difficulty  Psychiatric:  oriented to time, self, and place  Extremities: no obvious extremity swelling, no lesions    DATA:     Pertinent data reviewed    ASSESSMENT AND PLAN:    Patient is a 44 year old female with MNG  She has compressive symptoms: dysphagia, changes to voice    TSH is normal--> will recheck given ongoing tachycardia , she has also been following up with cardiology   We reviewed her US in detail: mng, stable, R thyroid nodule benign on FNA on 18th   She has some sensation of fullness, especially while eating certain foods. She has been evaluated by ENT and has had work up. For now, monitoring has been recommended  She will also be consulting with GI    PLAN:  TSH with reflex  Thyroid US  To call if she has any compressive symptoms or any worsening of current symptoms                Kim Crandall MD

## 2024-07-12 ENCOUNTER — TELEPHONE (OUTPATIENT)
Facility: CLINIC | Age: 44
End: 2024-07-12

## 2024-07-12 ENCOUNTER — APPOINTMENT (OUTPATIENT)
Dept: OBGYN | Age: 44
End: 2024-07-12

## 2024-07-12 VITALS
OXYGEN SATURATION: 97 % | HEART RATE: 117 BPM | SYSTOLIC BLOOD PRESSURE: 158 MMHG | DIASTOLIC BLOOD PRESSURE: 93 MMHG | TEMPERATURE: 98.9 F

## 2024-07-12 DIAGNOSIS — Z30.42 SURVEILLANCE FOR DEPO-PROVERA CONTRACEPTION: Primary | ICD-10-CM

## 2024-07-12 RX ORDER — MEDROXYPROGESTERONE ACETATE 150 MG/ML
150 INJECTION, SUSPENSION INTRAMUSCULAR ONCE
Status: COMPLETED | OUTPATIENT
Start: 2024-07-12 | End: 2024-07-12

## 2024-07-12 RX ADMIN — MEDROXYPROGESTERONE ACETATE 150 MG: 150 INJECTION, SUSPENSION INTRAMUSCULAR at 09:51

## 2024-07-12 ASSESSMENT — PATIENT HEALTH QUESTIONNAIRE - PHQ9
1. LITTLE INTEREST OR PLEASURE IN DOING THINGS: NOT AT ALL
2. FEELING DOWN, DEPRESSED OR HOPELESS: NOT AT ALL
SUM OF ALL RESPONSES TO PHQ9 QUESTIONS 1 AND 2: 0
CLINICAL INTERPRETATION OF PHQ2 SCORE: NO FURTHER SCREENING NEEDED
SUM OF ALL RESPONSES TO PHQ9 QUESTIONS 1 AND 2: 0

## 2024-07-12 NOTE — TELEPHONE ENCOUNTER
1st,overdue reminder letter mailed out to patient    Imaging order  US GALLBLADDER (CPT=[id number]) (Order #[id number]) on 4/30/24

## 2024-10-09 SDOH — ECONOMIC STABILITY: HOUSING INSECURITY: WHAT IS YOUR LIVING SITUATION TODAY?: I HAVE A STEADY PLACE TO LIVE

## 2024-10-09 SDOH — ECONOMIC STABILITY: FOOD INSECURITY: WITHIN THE PAST 12 MONTHS, THE FOOD YOU BOUGHT JUST DIDN'T LAST AND YOU DIDN'T HAVE MONEY TO GET MORE.: NEVER TRUE

## 2024-10-09 SDOH — ECONOMIC STABILITY: TRANSPORTATION INSECURITY
IN THE PAST 12 MONTHS, HAS LACK OF RELIABLE TRANSPORTATION KEPT YOU FROM MEDICAL APPOINTMENTS, MEETINGS, WORK OR FROM GETTING THINGS NEEDED FOR DAILY LIVING?: NO

## 2024-10-09 SDOH — ECONOMIC STABILITY: HOUSING INSECURITY: DO YOU HAVE PROBLEMS WITH ANY OF THE FOLLOWING?: NONE OF THE ABOVE

## 2024-10-09 SDOH — ECONOMIC STABILITY: GENERAL: WOULD YOU LIKE HELP WITH ANY OF THE FOLLOWING NEEDS?: I DON'T WANT HELP WITH ANY OF THESE

## 2024-10-09 ASSESSMENT — SOCIAL DETERMINANTS OF HEALTH (SDOH): IN THE PAST 12 MONTHS, HAS THE ELECTRIC, GAS, OIL, OR WATER COMPANY THREATENED TO SHUT OFF SERVICE IN YOUR HOME?: NO

## 2024-10-11 ENCOUNTER — APPOINTMENT (OUTPATIENT)
Dept: OBGYN | Age: 44
End: 2024-10-11

## 2024-10-11 DIAGNOSIS — Z30.42 SURVEILLANCE FOR DEPO-PROVERA CONTRACEPTION: Primary | ICD-10-CM

## 2024-10-11 RX ORDER — MEDROXYPROGESTERONE ACETATE 150 MG/ML
150 INJECTION, SUSPENSION INTRAMUSCULAR ONCE
Status: COMPLETED | OUTPATIENT
Start: 2024-10-11 | End: 2024-10-11

## 2024-10-11 RX ADMIN — MEDROXYPROGESTERONE ACETATE 150 MG: 150 INJECTION, SUSPENSION INTRAMUSCULAR at 09:52

## 2024-10-30 ENCOUNTER — OFFICE VISIT (OUTPATIENT)
Dept: RHEUMATOLOGY | Facility: CLINIC | Age: 44
End: 2024-10-30
Payer: COMMERCIAL

## 2024-10-30 VITALS
BODY MASS INDEX: 39.46 KG/M2 | SYSTOLIC BLOOD PRESSURE: 146 MMHG | HEART RATE: 116 BPM | WEIGHT: 209 LBS | HEIGHT: 61 IN | DIASTOLIC BLOOD PRESSURE: 83 MMHG

## 2024-10-30 DIAGNOSIS — Z51.81 MEDICATION MONITORING ENCOUNTER: ICD-10-CM

## 2024-10-30 DIAGNOSIS — M25.562 CHRONIC PAIN OF LEFT KNEE: ICD-10-CM

## 2024-10-30 DIAGNOSIS — M05.79 RHEUMATOID ARTHRITIS INVOLVING MULTIPLE SITES WITH POSITIVE RHEUMATOID FACTOR (HCC): Primary | ICD-10-CM

## 2024-10-30 DIAGNOSIS — G89.29 CHRONIC PAIN OF LEFT KNEE: ICD-10-CM

## 2024-10-30 DIAGNOSIS — M54.2 NECK PAIN: ICD-10-CM

## 2024-10-30 PROCEDURE — 3008F BODY MASS INDEX DOCD: CPT | Performed by: INTERNAL MEDICINE

## 2024-10-30 PROCEDURE — 3079F DIAST BP 80-89 MM HG: CPT | Performed by: INTERNAL MEDICINE

## 2024-10-30 PROCEDURE — 20610 DRAIN/INJ JOINT/BURSA W/O US: CPT | Performed by: INTERNAL MEDICINE

## 2024-10-30 PROCEDURE — 3077F SYST BP >= 140 MM HG: CPT | Performed by: INTERNAL MEDICINE

## 2024-10-30 PROCEDURE — 99214 OFFICE O/P EST MOD 30 MIN: CPT | Performed by: INTERNAL MEDICINE

## 2024-10-30 RX ORDER — TRIAMCINOLONE ACETONIDE 40 MG/ML
40 INJECTION, SUSPENSION INTRA-ARTICULAR; INTRAMUSCULAR ONCE
Status: COMPLETED | OUTPATIENT
Start: 2024-10-30 | End: 2024-10-30

## 2024-10-30 RX ORDER — NAPROXEN 500 MG/1
500 TABLET ORAL 2 TIMES DAILY WITH MEALS
Qty: 60 TABLET | Refills: 0 | Status: SHIPPED | OUTPATIENT
Start: 2024-10-30

## 2024-10-30 RX ORDER — METHOTREXATE 2.5 MG/1
20 TABLET ORAL WEEKLY
Qty: 104 TABLET | Refills: 1 | Status: SHIPPED | OUTPATIENT
Start: 2024-10-30

## 2024-10-30 NOTE — PROGRESS NOTES
Linda Minaya is a 44 year old female.    HPI:     Chief Complaint   Patient presents with    Rheumatoid Arthritis    Back Pain     Lower back        Knee Pain     Left    Neck Pain       I had the pleasure of seeing Linda Minaya on 10/30/2024 for follow up Seronegative RA    Current Medications:  Methotrexate 6 pills weekly- started 3/11/2024  Gabapentin, Lyrica- for migraine's  Elavil- for sleep  Previous medications:  Prednisone 5 mg daily- started 3/11/2024- May 2024  Blood work:  Neg CTD screen, dsDNA, CCP, UA 5.1  RF 20, , CRP 2.4 mg/dL    Interval History:  This is a 42 yo F with hx of HTN, GERD, Asthma, Migraine's, Left knee arthroscopy referred for polyarthralgia's.  She reports having diffuse joint pain for many years starting in her 30s but worsened over the past 2 or 3 years.  She has pain and swelling in all her joints and her muscles.  Her hands will swell when she cannot close them.  She states that her body aches all the time.  She will take a hot shower in the morning which does help but then pain comes back right away.  She has to lay for 20 minutes in bed to loosen up.  She reports a rash on both legs in the calf region.  No history of psoriasis.  She also has dry eyes and dry mouth.  She follows with neurology Dr. Lam for migraines and tremors.  She was placed on gabapentin and muscle relaxers for some of her muscle pain but did not help.  Blood work showing elevated inflammation markers and RF of 20.  She states that her mom does have RA.  Was placed on prednisone 10 mg daily for a month by her PCP, joint pain and swelling did improve.    4/24/2024:  Presents for f/u of Seronegative RA  Now on methotrexate 6 pills weekly, FA daily and prednisone 5 mg daily  Having pain intermittently, hard to stand for more than 5 minutes mainly in the lower back and the knees  Hands are doing a little better  Over past 3 weeks more tingling in hands and feet  Still has muscle pain throughout the  body  Feels like methotrexate and prednisone is helping her pain  Going to have a hysterectomy May 6 at [facility]    10/30/2024:  Presents for f/u of Seronegative RA  She is on methotrexate 6 pills weekly, FA daily  She is off prednisone now  Having worsening neck and lower back pain. Hard to turn neck due to the pain. Has been ongoing for about 4 weeks  She is also very stiff in the morning, takes about 20-30 minutes to loosen up  Had recent left foot swelling, has improved   Having worsening left knee pain, she was seen by orthopedic and xrays were done showing mild medial OA  Also has pain and swelling in the hands, comes and goes             HISTORY:  Past Medical History:    Anesthesia complication    increase heart rate after anesthesia    Arrhythmia    TACHYCARDIA    Asthma (HCC)    Back problem    Disorder of thyroid    thyroid nodules    Esophageal reflux    Essential hypertension    High blood pressure    Migraines    Osteoarthritis    RA    Palpitations    Problems with swallowing    Visual impairment    GLASSES      Social Hx Reviewed   Family Hx Reviewed     Medications (Active prior to today's visit):  Current Outpatient Medications   Medication Sig Dispense Refill    folic acid 1 MG Oral Tab Take 1 tablet (1 mg total) by mouth daily. 90 tablet 1    predniSONE 2.5 MG Oral Tab Take 1 tablet (2.5 mg total) by mouth 2 (two) times daily. 30 tablet 0    pregabalin 75 MG Oral Cap 75mg BID x 3 days 75 mg AM & 150 mg PM x 3 days 150 mg  AM & 150 mg  PM x  3 days 150 mg  AM & 300 mg PM x 12 days; then will need a refill. 105 capsule 0    Amitriptyline HCl 150 MG Oral Tab Take 1 tablet (150 mg total) by mouth nightly. 90 tablet 3    amLODIPine 5 MG Oral Tab Take 1 tablet (5 mg total) by mouth daily. 90 tablet 3    Omeprazole 40 MG Oral Capsule Delayed Release Take 1 capsule (40 mg total) by mouth daily.      albuterol 108 (90 Base) MCG/ACT Inhalation Aero Soln Inhale 2 puffs into the lungs every 4 (four) hours as  needed for Wheezing. 1 each 0    metoprolol succinate ER 25 MG Oral Tablet 24 Hr Take 1 tablet (25 mg total) by mouth daily. 90 tablet 0    Cholecalciferol (VITAMIN D) 1000 units Oral Tab Vitamin D      HYDROcodone-acetaminophen 5-325 MG Oral Tab Take 1 tablet by mouth every 6 (six) hours as needed. (Patient not taking: Reported on 10/30/2024) 12 tablet 0    methotrexate 2.5 MG Oral Tab Take 6 tablets (15 mg total) by mouth once a week. (Patient not taking: Reported on 10/30/2024) 78 tablet 1    predniSONE 10 MG Oral Tab 30 mg daily x3 days then 20 mg daily x3 days then 10 mg daily x3 days then stay on 5 mg daily (Patient not taking: Reported on 10/30/2024) 60 tablet 0    PEG 3350-KCl-Na Bicarb-NaCl (TRILYTE) 420 g Oral Recon Soln Take prep as directed by gastro office. May substitute with Trilyte/generic equivalent if needed. 4000 mL 0    fluticasone propionate 50 MCG/ACT Nasal Suspension 2 sprays by Nasal route daily. (Patient not taking: Reported on 10/30/2024) 16 g 3    tranexamic acid 650 MG Oral Tab Take 2 tablets (1,300 mg total) by mouth 3 (three) times daily. (Patient not taking: Reported on 10/30/2024)       .cmed  Allergies:  No Known Allergies      ROS:   All other ROS are negative.     PHYSICAL EXAM:   GEN: AAOx3, NAD  HEENT: EOMI, PERRLA, no injection or icterus, oral mucosa moist, no oral lesions. No lymphadenopathy. No facial rash  CVS: RRR, no murmurs rubs or gallops. Equal 2+ distal pulses.   LUNGS: CTAB, no increased work of breathing  ABDOMEN:  soft NT/ND, +BS, no HSM  SKIN: No rashes or skin lesions. No nail findings  MSK:  TTP in paraspinal region and trapezius region, it has improved  No swelling no synovitis in her hands.  Now able to make a full fist  Left knee TTP  NEURO: Cranial nerves II-XII intact grossly. 5/5 strength throughout in both upper and lower extremities, sensation intact.  PSYCH: normal mood       LABS:     Component      Latest Ref Rng 11/3/2023 1/5/2024   Expanded COREY  Antibody Screen, IGG      <0.7 ug/l 0.30      Anti-dsDNA antibody      <10 IU/mL 3.0      Connective Tissue Disease Screen Interpretation      Negative  Negative      RHEUMATOID FACTOR      <14 IU/mL 20 (H)      URIC ACID      3.1 - 7.8 mg/dL 5.1      C-Citrullinated Peptide IgG AB      0.0 - 6.9 U/mL 2.9      SED RATE      0 - 20 mm/Hr 48 (H)  101 (H)    C-REACTIVE PROTEIN      <1.00 mg/dL 1.90 (H)  2.40 (H)       Imaging:     XR Lumbar spine 2021:  Slight scoliosis with mild degenerative change lumbar spine.      MRI cervical spine 2019:  1. Minimal degenerative changes of the cervical spine with at most minimal foraminal narrowing, without further compromise of the neural structures.      2. No abnormal cord signal intensity.      3. No intramedullary enhancement of the cervical cord is identified.      4. Left-sided perineural cysts are present at C6-C7 and C7-T1.      5. No acute osseous injury of the cervical spine.     ASSESSMENT/PLAN:     Seronegative RA  - She reports joint pain and swelling throughout her body, she has swelling in her hands where she cannot make a fist in the morning.  Blood work showing elevated ESR and CRP and RF of 20.  Mom has RA  - Having worsening joint pain.  Had recent pain and swelling in her left foot.  Worsening left knee pain but this is from osteoarthritis  - Plan to increase methotrexate to 8 pills weekly, folic acid daily  - Also prescribed naproxen to take once or twice a day for her pain.  She will not take with Aleve or Motrin ibuprofen  - Blood work today    Left knee pain secondary to OA  - X-rays showed mild OA, this was reviewed on her phone done by orthopedic  - Left knee was injected with 1 cc of lidocaine and 40 mg of Kenalog in sterile fashion.  Patient tolerated procedure well    Neck pain  -Plan to obtain x-ray of the cervical spine    Myofascial pain  - She also has a lot of muscle pain throughout her body.  On Gabapentin and Lyrica for migraines, this fully  will help her muscle pain    Migraines  - Following with neurology, she is on gabapentin and Lyrica for her migraines    Hysterectomy   - She is following with OB/GYN, she had a hysterectomy on May 6    Pt will f/u in 3-4 mos     There is a longitudinal care relationship with me, the care plan reflects the ongoing nature of the continuous relationship of care, and the medical record indicates that there is ongoing treatment of a serious/complex medical condition which I am currently managing.  is Applicable.     Shweta Monson MD  10/30/2024   12:28 PM

## 2024-10-30 NOTE — PROCEDURES
With paitent's consent, I injected pt's Left knee with 1ml lidocaine 1 % and 1 ml kenalog 40. It was done under sterile technique using iodine and alcohol swabs and ethyl chloride was used as an anaesthetic spray. Pt.  tolerated it well.

## 2024-10-30 NOTE — PATIENT INSTRUCTIONS
You were seen for rheumatoid arthritis  Had a worsening joint pain  Lets increase methotrexate to 8 pills weekly  Continue folic acid daily  I also gave you naproxen to take once or twice a day for your pain  We injected your left knee with cortisone    Get blood work today  X-ray of the neck  Follow-up in 3 to 4-month

## 2024-11-02 ENCOUNTER — HOSPITAL ENCOUNTER (OUTPATIENT)
Dept: GENERAL RADIOLOGY | Age: 44
Discharge: HOME | End: 2024-11-02
Attending: INTERNAL MEDICINE
Payer: COMMERCIAL

## 2024-11-02 DIAGNOSIS — M54.2 NECK PAIN: ICD-10-CM

## 2024-11-02 DIAGNOSIS — M05.79 RHEUMATOID ARTHRITIS INVOLVING MULTIPLE SITES WITH POSITIVE RHEUMATOID FACTOR (HCC): ICD-10-CM

## 2024-11-02 PROCEDURE — 72050 X-RAY EXAM NECK SPINE 4/5VWS: CPT | Performed by: INTERNAL MEDICINE

## 2024-11-11 ENCOUNTER — OFFICE VISIT (OUTPATIENT)
Dept: INTERNAL MEDICINE CLINIC | Facility: CLINIC | Age: 44
End: 2024-11-11
Payer: COMMERCIAL

## 2024-11-11 VITALS
SYSTOLIC BLOOD PRESSURE: 136 MMHG | BODY MASS INDEX: 39.91 KG/M2 | WEIGHT: 211.38 LBS | RESPIRATION RATE: 18 BRPM | HEIGHT: 61 IN | HEART RATE: 127 BPM | DIASTOLIC BLOOD PRESSURE: 78 MMHG | OXYGEN SATURATION: 96 %

## 2024-11-11 DIAGNOSIS — R00.0 TACHYCARDIA: ICD-10-CM

## 2024-11-11 DIAGNOSIS — I10 ESSENTIAL HYPERTENSION: ICD-10-CM

## 2024-11-11 DIAGNOSIS — D50.9 MICROCYTIC ANEMIA: ICD-10-CM

## 2024-11-11 DIAGNOSIS — M47.812 CERVICAL SPINE ARTHRITIS: Primary | ICD-10-CM

## 2024-11-11 DIAGNOSIS — M19.90 INFLAMMATORY ARTHRITIS: ICD-10-CM

## 2024-11-11 DIAGNOSIS — R73.9 HYPERGLYCEMIA: ICD-10-CM

## 2024-11-11 PROCEDURE — 3075F SYST BP GE 130 - 139MM HG: CPT | Performed by: INTERNAL MEDICINE

## 2024-11-11 PROCEDURE — 3078F DIAST BP <80 MM HG: CPT | Performed by: INTERNAL MEDICINE

## 2024-11-11 PROCEDURE — 3008F BODY MASS INDEX DOCD: CPT | Performed by: INTERNAL MEDICINE

## 2024-11-11 PROCEDURE — 99214 OFFICE O/P EST MOD 30 MIN: CPT | Performed by: INTERNAL MEDICINE

## 2024-11-12 ENCOUNTER — HOSPITAL ENCOUNTER (OUTPATIENT)
Facility: HOSPITAL | Age: 44
Setting detail: HOSPITAL OUTPATIENT SURGERY
Discharge: HOME | End: 2024-11-12
Attending: INTERNAL MEDICINE | Admitting: INTERNAL MEDICINE
Payer: COMMERCIAL

## 2024-11-12 ENCOUNTER — ANESTHESIA (OUTPATIENT)
Dept: ENDOSCOPY | Facility: HOSPITAL | Age: 44
End: 2024-11-12
Payer: COMMERCIAL

## 2024-11-12 ENCOUNTER — ANESTHESIA EVENT (OUTPATIENT)
Dept: ENDOSCOPY | Facility: HOSPITAL | Age: 44
End: 2024-11-12
Payer: COMMERCIAL

## 2024-11-12 VITALS
TEMPERATURE: 98 F | DIASTOLIC BLOOD PRESSURE: 90 MMHG | WEIGHT: 210 LBS | OXYGEN SATURATION: 97 % | BODY MASS INDEX: 39.65 KG/M2 | RESPIRATION RATE: 17 BRPM | SYSTOLIC BLOOD PRESSURE: 138 MMHG | HEIGHT: 61 IN | HEART RATE: 96 BPM

## 2024-11-12 DIAGNOSIS — R13.19 ESOPHAGEAL DYSPHAGIA: ICD-10-CM

## 2024-11-12 DIAGNOSIS — K21.9 GASTROESOPHAGEAL REFLUX DISEASE, UNSPECIFIED WHETHER ESOPHAGITIS PRESENT: ICD-10-CM

## 2024-11-12 DIAGNOSIS — R19.4 CHANGE IN BOWEL HABITS: ICD-10-CM

## 2024-11-12 PROBLEM — Q43.8 TORTUOUS COLON: Status: ACTIVE | Noted: 2024-11-12

## 2024-11-12 PROBLEM — K44.9 HIATAL HERNIA: Status: ACTIVE | Noted: 2024-11-12

## 2024-11-12 LAB — B-HCG UR QL: NEGATIVE

## 2024-11-12 PROCEDURE — 0DB18ZX EXCISION OF UPPER ESOPHAGUS, VIA NATURAL OR ARTIFICIAL OPENING ENDOSCOPIC, DIAGNOSTIC: ICD-10-PCS | Performed by: INTERNAL MEDICINE

## 2024-11-12 PROCEDURE — 81025 URINE PREGNANCY TEST: CPT

## 2024-11-12 PROCEDURE — 88305 TISSUE EXAM BY PATHOLOGIST: CPT | Performed by: INTERNAL MEDICINE

## 2024-11-12 PROCEDURE — 88312 SPECIAL STAINS GROUP 1: CPT | Performed by: INTERNAL MEDICINE

## 2024-11-12 PROCEDURE — 0DJD8ZZ INSPECTION OF LOWER INTESTINAL TRACT, VIA NATURAL OR ARTIFICIAL OPENING ENDOSCOPIC: ICD-10-PCS | Performed by: INTERNAL MEDICINE

## 2024-11-12 RX ORDER — METHYLPREDNISOLONE 4 MG/1
TABLET ORAL
Qty: 21 TABLET | Refills: 0 | Status: SHIPPED | OUTPATIENT
Start: 2024-11-12

## 2024-11-12 RX ORDER — SODIUM CHLORIDE, SODIUM LACTATE, POTASSIUM CHLORIDE, CALCIUM CHLORIDE 600; 310; 30; 20 MG/100ML; MG/100ML; MG/100ML; MG/100ML
INJECTION, SOLUTION INTRAVENOUS CONTINUOUS
Status: DISCONTINUED | OUTPATIENT
Start: 2024-11-12 | End: 2024-11-12

## 2024-11-12 RX ORDER — NALOXONE HYDROCHLORIDE 0.4 MG/ML
0.08 INJECTION, SOLUTION INTRAMUSCULAR; INTRAVENOUS; SUBCUTANEOUS ONCE AS NEEDED
Status: DISCONTINUED | OUTPATIENT
Start: 2024-11-12 | End: 2024-11-12

## 2024-11-12 RX ORDER — LIDOCAINE HYDROCHLORIDE 10 MG/ML
INJECTION, SOLUTION EPIDURAL; INFILTRATION; INTRACAUDAL; PERINEURAL AS NEEDED
Status: DISCONTINUED | OUTPATIENT
Start: 2024-11-12 | End: 2024-11-12 | Stop reason: SURG

## 2024-11-12 RX ORDER — LABETALOL HYDROCHLORIDE 5 MG/ML
INJECTION, SOLUTION INTRAVENOUS AS NEEDED
Status: DISCONTINUED | OUTPATIENT
Start: 2024-11-12 | End: 2024-11-12 | Stop reason: SURG

## 2024-11-12 RX ADMIN — SODIUM CHLORIDE, SODIUM LACTATE, POTASSIUM CHLORIDE, CALCIUM CHLORIDE: 600; 310; 30; 20 INJECTION, SOLUTION INTRAVENOUS at 12:37:00

## 2024-11-12 RX ADMIN — LIDOCAINE HYDROCHLORIDE 100 MG: 10 INJECTION, SOLUTION EPIDURAL; INFILTRATION; INTRACAUDAL; PERINEURAL at 12:44:00

## 2024-11-12 RX ADMIN — LABETALOL HYDROCHLORIDE 10 MG: 5 INJECTION, SOLUTION INTRAVENOUS at 12:40:00

## 2024-11-12 NOTE — DISCHARGE INSTRUCTIONS
Home Care Instructions for Colonoscopy and/or Gastroscopy with Sedation    Diet:  - Resume your regular diet as tolerated unless otherwise instructed.  - Start with light meals to minimize bloating.  - Do not drink alcohol today.    Medication:  - If you have questions about resuming your normal medications, please contact your Primary Care Physician.    Activities:  - Take it easy today. Do not return to work today.  - Do not drive today.  - Do not operate any machinery today (including kitchen equipment).    Colonoscopy:  - You may notice some rectal \"spotting\" (a little blood on the toilet tissue) for a day or two after the exam. This is normal.  - If you experience any rectal bleeding (not spotting), persistent tenderness or sharp severe abdominal pains, oral temperature over 100 degrees Fahrenheit, light-headedness or dizziness, or any other problems, contact your doctor.    Gastroscopy:  - You may have a sore throat for 2-3 days following the exam. This is normal. Gargling with warm salt water (1/2 tsp salt to 1 glass warm water) or using throat lozenges will help.  - If you experience any sharp pain in your neck, abdomen or chest, vomiting of blood, oral temperature over 100 degrees Fahrenheit, light-headedness or dizziness, or any other problems, contact your doctor.    **If unable to reach your doctor, please go to the [facility] Emergency Room**    - Your referring physician will receive a full report of your examination.  - If you do not hear from your doctor's office within two weeks of your biopsy, please call them for your results.    You may be able to see your laboratory results in [employer] between 4 and 7 business days.  In some cases, your physician may not have viewed the results before they are released to [employer].  If you have questions regarding your results contact the physician who ordered the test/exam by phone or via [employer] by choosing \"Ask a Medical Question.\"

## 2024-11-12 NOTE — H&P
History & Physical Examination    Patient Name: Linda Minaya  MRN: [id number]  [facility]: [id number]  YOB: 1980    Diagnosis: Screening, GERD,  dysphagia    Prescriptions Prior to Admission[1]  Current Facility-Administered Medications   Medication Dose Route Frequency    lactated ringers infusion   Intravenous Continuous       Allergies: Allergies[2]    Past Medical History:    Anesthesia complication    increase heart rate after anesthesia    Arrhythmia    TACHYCARDIA    Asthma (HCC)    Back problem    Disorder of thyroid    thyroid nodules    Esophageal reflux    Essential hypertension    High blood pressure    Migraines    Osteoarthritis    RA    Palpitations    Problems with swallowing    Visual impairment    GLASSES     Past Surgical History:   Procedure Laterality Date    Appendectomy      may 2024    Arthroscopy of joint unlisted Left     knee x 2          5 yrs ago    Tubal ligation           Family History   Problem Relation Age of Onset    Hypertension Father     Diabetes Mother     Other (hepatitis) Mother         hepatitis C    No Known Problems Son     Colon Polyps Maternal Grandmother     Diabetes Maternal Grandmother     Heart Disorder Maternal Grandmother 40        atrial fibrillation, heart problems in cousins, some who smoke    Other (hepatitis C) Maternal Grandmother     Breast Cancer Maternal Aunt 40    Cancer Other 40        breast cancer    Other (breast cancer) Other         40's    Other (colon cancer) Other         40's     Social History     Tobacco Use    Smoking status: Never    Smokeless tobacco: Never   Substance Use Topics    Alcohol use: No     Comment: RARE         SYSTEM Check if Review is Normal Check if Physical Exam is Normal If not normal, please explain:   HEENT [X ] [ X]    NECK  [X ] [ X]    HEART [X ] [ X]    LUNGS [X ] [ X]    ABDOMEN [X ] [ X]    EXTREMITIES [X ] [ X]    OTHER        I have discussed the risks and benefits and alternatives of the  procedure with the patient/family.  They understand and agree to proceed with plan of care.   I have reviewed the History and Physical done within the last 30 days.  Any changes noted above.    SILVINO Angeles MD  [facility] - Gastroenterology  11/12/2024  12:42 PM               [1]   Facility-Administered Medications Prior to Admission   Medication Dose Route Frequency Provider Last Rate Last Admin    [COMPLETED] triamcinolone acetonide (Kenalog-40) 40 MG/ML injection 40 mg  40 mg Intra-articular Once Shweta Monson MD   40 mg at 10/30/24 1045     Medications Prior to Admission   Medication Sig Dispense Refill Last Dose/Taking    naproxen 500 MG Oral Tab Take 1 tablet (500 mg total) by mouth 2 (two) times daily with meals. 60 tablet 0 11/8/2024    methotrexate 2.5 MG Oral Tab Take 8 tablets (20 mg total) by mouth once a week. 104 tablet 1 11/8/2024    folic acid 1 MG Oral Tab Take 1 tablet (1 mg total) by mouth daily. 90 tablet 1 11/8/2024    pregabalin 75 MG Oral Cap 75mg BID x 3 days 75 mg AM & 150 mg PM x 3 days 150 mg  AM & 150 mg  PM x  3 days 150 mg  AM & 300 mg PM x 12 days; then will need a refill. 105 capsule 0 11/10/2024    Amitriptyline HCl 150 MG Oral Tab Take 1 tablet (150 mg total) by mouth nightly. 90 tablet 3 11/11/2024    amLODIPine 5 MG Oral Tab Take 1 tablet (5 mg total) by mouth daily. 90 tablet 3 11/12/2024    Omeprazole 40 MG Oral Capsule Delayed Release Take 1 capsule (40 mg total) by mouth daily.   10/23/2024    metoprolol succinate ER 25 MG Oral Tablet 24 Hr Take 1 tablet (25 mg total) by mouth daily. 90 tablet 0 11/12/2024    methylPREDNISolone (MEDROL) 4 MG Oral Tablet Therapy Pack As directed. 21 tablet 0     PEG 3350-KCl-Na Bicarb-NaCl (TRILYTE) 420 g Oral Recon Soln Take prep as directed by gastro office. May substitute with Trilyte/generic equivalent if needed. 4000 mL 0     albuterol 108 (90 Base) MCG/ACT Inhalation Aero Soln Inhale 2 puffs into the lungs every 4 (four) hours as  needed for Wheezing. 1 each 0     Cholecalciferol (VITAMIN D) 1000 units Oral Tab Vitamin D      [2] No Known Allergies

## 2024-11-12 NOTE — OPERATIVE REPORT
ESOPHAGOGASTRODUODENOSCOPY (EGD) & COLONOSCOPY REPORT    Linda Minaya     1980 Age 44 year old   PCP Og Davison MD Endoscopist Tip Angeles MD     Date of procedure: 24    Procedure: EGD w/cold biopsy & Colonoscopy w/cold biopsy    Pre-operative diagnosis: Screening, GERD, dysphagia    Post-operative diagnosis: Hiatal hernia, tortuous colon, internal hemorrhoids    Medications: MAC    Withdrawal time: 12 minutes    Complications: none    Procedure: Informed consent was obtained from the patient after the risks of the procedure were discussed, including but not limited to bleeding, perforation, aspiration, infection, or possibility of a missed lesion. We discussed the risks/benefits and alternatives to this procedure, as well as the planned sedation. EGD procedure: The patient was placed in the left lateral decubitus position and begun on continuous blood pressure pulse oximetry and EKG monitoring and this was maintained throughout the procedure. Once an adequate level of sedation was obtained a bite block was placed. Then the lubricated tip of the [id number] diagnostic video upper endoscope was inserted and advanced using direct visualization into the posterior pharynx and ultimately into the esophagus. Colonoscopy procedure: Once an adequate level of sedation was obtained a digital rectal exam was completed. Then the lubricated tip of the [id number] diagnostic video colonoscope was inserted and advanced without difficulty to the cecum using the CO2 insufflation technique. The cecum was identified by localizing the trifold, the appendix and the ileocecal valve. A routine second examination of the cecum/ascending colon was performed. Withdrawal was begun with thorough washing and careful examination of the colonic walls and folds. Photodocumentation was obtained. The bowel prep was fair to poor in some areas. Views of the colon were fair to good with washing. I then carefully  withdrew the instrument from the patient who tolerated the procedure well.     Complications: None    EGD findings:      1. Esophagus: The squamocolumnar junction was noted at 40 cm and appeared regular. The diaphragmatic pinch was noted noted at 40 cm from the incisors. The esophageal mucosa appeared normal. There was no evidence of esophagitis, stricture or endoscopic evidence of Mayes's esophagus. Biopsies obtained of proximal esophagus.  2. Stomach: The stomach distended normally. Normal rugal folds were seen. The pylorus was patent. The gastric mucosa appeared normal s/p random biopsies. Retroflexion revealed a normal fundus and a non-patulous cardia.   3. Duodenum: The duodenal mucosa appeared normal in the 1st and 2nd portion of the duodenum.     Colonoscopy findings:    1. No polyp(s) noted, prep was poor in some areas improved to fair with washing.  2. Diverticulosis: none noted.  3. Tortuous sigmoid colon.  4. The IC valve appears enlarged and erythematous, likely lipomatous change s/p biopsies. Otherwise the colonic mucosa throughout the colon showed normal vascular pattern, without evidence of angioectasias or inflammation.   5. A retroflexed view of the rectum revealed small internal hemorrhoids.  6. RODRIGUEZ: normal rectal tone, no masses palpated.     Impression:  EGD shows no mass or ulcer, small hiatal hernia. Suspect reflux induced swallowing issues.  CLN shows tortuous colon, internal hemorrhoids. IC valve erythematous change s/p biopsies. Fair prep in some areas.    Recommend:  Await pathology. Repeat CLN In 3 years. If new signs or symptoms develop, colonoscopy may need to be repeated sooner.   Recommend enhanced bowel prep for next colonoscopy (3 days of dulcolax + bowel prep). Patient should be reminded NOT to consume seeds, nuts, corn, popcorn or raw fruits/vegetables for 5 days prior to next colonoscopy.   High fiber diet.  Monitor for blood in the stool. If having more than just tinge of  blood, call office or go to the ER.  F/u with PCP and your cardiologist re: BP and heart rate.  Avoid caffeine, chocolate, peppermint, and alcohol. Also avoid lying down flat or in a recumbent position for 3 hours after a meal.   PPI daily.    >>>If tissue was obtained and you have not received your pathology results either by phone or letter within 2 weeks, please call our office at [phone].    Specimens: colon, gastric, esophageal  Blood loss: <1 ml

## 2024-11-12 NOTE — ANESTHESIA POSTPROCEDURE EVALUATION
Patient: Linda Minaya    Procedure Summary       Date: 11/12/24 Room / Location: [facility] 03 / [facility]    Anesthesia Start: 1237 Anesthesia Stop:     Procedures:       COLONOSCOPY      ESOPHAGOGASTRODUODENOSCOPY (EGD) Diagnosis:       Esophageal dysphagia      Gastroesophageal reflux disease, unspecified whether esophagitis present      Change in bowel habits      (Tortuous colon, hemorrhoids, normal EGD)    Surgeons: NATALI Angeles MD Anesthesiologist: Eugenia Tony CRNA    Anesthesia Type: MAC ASA Status: 2            Anesthesia Type: MAC    Vitals Value Taken Time   /91 11/12/24 1321   Temp 98 °F (36.7 °C) 11/12/24 1321   Pulse 100 11/12/24 1322   Resp 24 11/12/24 1322   SpO2 98 % 11/12/24 1322   Vitals shown include unfiled device data.    [facility] AN Post Evaluation:   Patient Evaluated in PACU  Patient Participation: complete - patient participated  Level of Consciousness: sleepy but conscious  Pain Score: 0  Pain Management: adequate  Airway Patency:patent  Dental exam unchanged from preop  Yes    Cardiovascular Status: stable and acceptable  Respiratory Status: acceptable and room air  Postoperative Hydration acceptable      EUGENIA TONY CRNA  11/12/2024 1:22 PM

## 2024-11-14 ENCOUNTER — PATIENT MESSAGE (OUTPATIENT)
Facility: CLINIC | Age: 44
End: 2024-11-14

## 2024-11-14 ENCOUNTER — TELEPHONE (OUTPATIENT)
Facility: CLINIC | Age: 44
End: 2024-11-14

## 2024-11-14 RX ORDER — LEVOFLOXACIN 500 MG/1
500 TABLET, FILM COATED ORAL EVERY 24 HOURS
Qty: 14 TABLET | Refills: 0 | Status: SHIPPED | OUTPATIENT
Start: 2024-11-14

## 2024-11-14 RX ORDER — AMOXICILLIN 500 MG/1
1000 TABLET, FILM COATED ORAL 2 TIMES DAILY
Qty: 56 TABLET | Refills: 0 | Status: SHIPPED | OUTPATIENT
Start: 2024-11-14 | End: 2024-11-28

## 2024-11-14 NOTE — TELEPHONE ENCOUNTER
[facility] pharmacy states methotrexate and amoxicillin increases levels may be a drug interaction and wondering if this is okay please follow up

## 2024-11-14 NOTE — TELEPHONE ENCOUNTER
Thank you  I reviewed below complete message from Dr. Angeles with the pharmacist at [facility] and they will fill it for the patient.

## 2024-11-14 NOTE — TELEPHONE ENCOUNTER
Biopsies of colon of IC valve picked up adenomatous tissue, no obvious growth seen, so unclear if randomly sampled polyp. However will need repeat CLN in 3-6 months. D/w patient re: this.      NISHA Sutton- she will see you in Jan 2025, please check Hpylori breath test and re-order c-scope for her at that time. Enhanced prep. Thx

## 2024-11-14 NOTE — TELEPHONE ENCOUNTER
A. Gastric; biopsies:   Fragments of oxyntic mucosa with chronic inactive gastritis and microorganisms morphologically compatible with H. Pylori  No intestinal metaplasia identified  Diff Quik stain (with appropriate control reactivity) is positive for H pylori microorganisms     B. Proximal esophagus; biopsies:  Fragments of squamous epithelium with mild reactive changes  No eosinophils or neutrophils seen  No intestinal metaplasia seen     C. Ileocecal valve; biopsies:  Fragments of tubular adenoma      ----------      H.pylori gastric infection identified on a test. The natural history of H.pylori was reviewed with the patient, as well as possible complications from H.pylori including stomach cancer, gastritis, dyspepsia, anemia and ulcers. We discussed treatment options and rationale for treatment, as well as testing for eradication. The patient understands the risks/benefits/side-effects of treatment and wants to proceed. The patient understands only 80-95% of patients have eradication to initial course of abx, and therefore may need another course of abx if fails first treatment. There may be side-effects during treatment and patient should call if any problems.      Levofloxacin-based triple therapy (second line)  1. Levofloxacin 500mg PO daily  2. Amoxicillin 1000mg twice a day  3. Standard dose PPI BID

## 2024-11-14 NOTE — TELEPHONE ENCOUNTER
D/w patient re: Methotrexate, she will HOLD the dose during abx therapy. Okay to continue.    GI RN Staff:   Please let pharmacy know to dispense

## 2024-11-14 NOTE — TELEPHONE ENCOUNTER
Dr. Karthik Johnson called about below drug interaction.  Please advise if ok to take.    Thank you

## 2024-11-15 ENCOUNTER — TELEPHONE (OUTPATIENT)
Facility: CLINIC | Age: 44
End: 2024-11-15

## 2024-11-15 DIAGNOSIS — Z12.11 COLON CANCER SCREENING: Primary | ICD-10-CM

## 2024-11-15 NOTE — TELEPHONE ENCOUNTER
NATALI Angeles MD Physician Signed 12:11 PM           GI Scheduling Staff:      Please schedule: Colonoscopy with MAC in 3-6 months - schedule for 60 MIN     Please send SPLIT dose Golytely bowel prep.   Recommend enhanced bowel prep for next colonoscopy (3 days of dulcolax + bowel prep). Patient should be reminded NOT to consume seeds, nuts, corn, popcorn or raw fruits/vegetables for 5 days prior to next colonoscopy.      Diagnosis: Screening     Medication adjustments:  Day before procedure, hold: NONE  Day of procedure, hold: NONE     >>>Please inform patient if new medications are started after scheduling procedure they need to call clinic to notify us.

## 2024-11-15 NOTE — TELEPHONE ENCOUNTER
Scheduled for:  Colonoscopy 24756  Provider Name:  Dr. Angeles   Date:  3/4/2025  Location:    [facility]  Sedation:  mac  Time:  11:00 (pt is aware that ENDO will call the day before to confirm arrival time)  Prep:  3 days of dulcolax and Golytely   Meds/Allergies Reconciled?:  Physician reviewed   Diagnosis with codes:  Colon cancer screening Z12.11  Was patient informed to call insurance with codes (Y/N):  Yes, I confirmed [employer] insurance with the patient.   Referral sent?:  Referral was sent at the time of electronic surgical scheduling.  [facility] or [facility] notified?:  I sent an electronic request to Endo Scheduling and received a confirmation today.   Medication Orders:  This patient verbally confirmed that she is not taking:   Iron, blood thinners, BP meds, and is not diabetic   Not latex allergy, Not PCN allergy and does not have a pacemaker  Misc Orders:  I discussed the prep instructions with the patient which she verbally understood and is aware that I will mychart the instructions today.       Further instructions given by staff:

## 2024-11-15 NOTE — TELEPHONE ENCOUNTER
GI Scheduling Staff:     Please schedule: Colonoscopy with MAC in 3-6 months - schedule for 60 MIN    Please send SPLIT dose Golytely bowel prep.   Recommend enhanced bowel prep for next colonoscopy (3 days of dulcolax + bowel prep). Patient should be reminded NOT to consume seeds, nuts, corn, popcorn or raw fruits/vegetables for 5 days prior to next colonoscopy.     Diagnosis: Screening    Medication adjustments:  Day before procedure, hold: NONE  Day of procedure, hold: NONE    >>>Please inform patient if new medications are started after scheduling procedure they need to call clinic to notify us.

## 2025-01-02 NOTE — PROGRESS NOTES
[facility] - Gastroenterology                                                                                                               Reason for consult: eval    Requesting physician or provider: Og Davison MD    Chief Complaint   Patient presents with    Follow - Up       HPI:   Linda Minaya is a 44 year old year-old female with history of arrhythmia, asthma, gerd, htn, migraines, oa, palpitations:     she is here today for f/U    Cln/egd w/ Dr. Angeles 11/2024    A. Gastric; biopsies:   Fragments of oxyntic mucosa with chronic inactive gastritis and microorganisms morphologically compatible with H. Pylori  No intestinal metaplasia identified  Diff Quik stain (with appropriate control reactivity) is positive for H pylori microorganisms     B. Proximal esophagus; biopsies:  Fragments of squamous epithelium with mild reactive changes  No eosinophils or neutrophils seen  No intestinal metaplasia seen     C. Ileocecal valve; biopsies:  Fragments of tubular adenoma    Hpylori treated with levo+amox+ppi (pt held methotrexate)     IC valve biopsy w/ adenomatous tissue, however no obvious polyps - plan to repeat cln in 3-6 mos w/ enhanced prep    she moves her bowels daily to every 2-3 days.  she denies brbpr and/or melena.    Heartburn a few times a week. No particular dietary triggers.  she denies dysphagia, odynophagia and/or globus. she denies abdominal pain. she denies nausea and/or vomiting.  she denies recent change in appetite and/or unintentional weight loss.    Chronic anemia  Last cbc 1/2024 hgb 11.4 and stable from comparison    Gb polyp s/p ccy     NSAIDS: no  Tobacco: no  Alcohol: no  Marijuana: no  Illicit drugs: no    Mother had ovarian ca with mets to cln  Maternal gm had pancreatic ca  Maternal great aunt x 2 had colon ca     Tachycardic with sedation  No RAYRAY  No anticoagulants  No  pacemaker/defibrillator  No pain medications and/or sleep aides      Wt Readings from Last 6 Encounters:   25 211 lb 8 oz (95.9 kg)   24 210 lb (95.3 kg)   24 211 lb 6.4 oz (95.9 kg)   10/30/24 209 lb (94.8 kg)   24 211 lb 6.4 oz (95.9 kg)   24 208 lb (94.3 kg)        History, Medications, Allergies, ROS:      Past Medical History:    Anesthesia complication    increase heart rate after anesthesia    Arrhythmia    TACHYCARDIA    Asthma (HCC)    Back problem    Colon polyp    repeat CLN in     Constipation    Disorder of thyroid    thyroid nodules    Esophageal reflux    Essential hypertension    Helicobacter positive gastritis    on abx    High blood pressure    Migraines    Osteoarthritis    RA    Palpitations    Visual impairment    GLASSES      Past Surgical History:   Procedure Laterality Date    Appendectomy      may 2024    Arthroscopy of joint unlisted Left     knee x 2          5 yrs ago    Colonoscopy N/A 2024    Procedure: COLONOSCOPY;  Surgeon: NATALI Angeles MD;  Location: [facility] ENDOSCOPY    Tubal ligation            Family Hx:   Family History   Problem Relation Age of Onset    Hypertension Father     Diabetes Mother     Other (hepatitis) Mother         hepatitis C    No Known Problems Son     Colon Polyps Maternal Grandmother     Diabetes Maternal Grandmother     Heart Disorder Maternal Grandmother 40        atrial fibrillation, heart problems in cousins, some who smoke    Other (hepatitis C) Maternal Grandmother     Breast Cancer Maternal Aunt 40    Cancer Other 40        breast cancer    Other (breast cancer) Other         40's    Other (colon cancer) Other         40's      Social History:   Social History     Socioeconomic History    Marital status:    Tobacco Use    Smoking status: Never    Smokeless tobacco: Never   Vaping Use    Vaping status: Never Used   Substance and Sexual Activity    Alcohol use: No     Comment: RARE    Drug use:  No   Other Topics Concern    Caffeine Concern Yes     Comment: Coffee: 1 cup daily    Exercise No   Social History Narrative    The patient does not use an assistive device..      The patient does not live in a home with stairs.     Social Drivers of Health     Food Insecurity: Low Risk  (10/9/2024)    Received from [facility]    Food Insecurity     Within the past 12 months, you worried that your food would run out before you got money to buy more.  : Never true     Within the past 12 months, the food you bought just didn't last and you didn't have money to get more. : Never true   Transportation Needs: Not At Risk (10/9/2024)    Received from [facility]    Transportation Needs     In the past 12 months, has lack of reliable transportation kept you from medical appointments, meetings, work or from getting things needed for daily living? : No        Medications (Active prior to today's visit):  Current Outpatient Medications   Medication Sig Dispense Refill    polyethylene glycol, PEG 3350-KCl-NaBcb-NaCl-NaSulf, 236 g Oral Recon Soln Take 4,000 mL by mouth As Directed. May substitute prescription with Golytely/Nulytely/Gavilyte and or generic equivalent, if needed. Take bowel preparation as provided by Gastroenterology office, or visit our website at [email or web address] 4000 mL 0    methotrexate 2.5 MG Oral Tab Take 8 tablets (20 mg total) by mouth once a week. 104 tablet 1    folic acid 1 MG Oral Tab Take 1 tablet (1 mg total) by mouth daily. 90 tablet 1    pregabalin 75 MG Oral Cap 75mg BID x 3 days 75 mg AM & 150 mg PM x 3 days 150 mg  AM & 150 mg  PM x  3 days 150 mg  AM & 300 mg PM x 12 days; then will need a refill. 105 capsule 0    Amitriptyline HCl 150 MG Oral Tab Take 1 tablet (150 mg total) by mouth nightly. 90 tablet 3    amLODIPine 5 MG Oral Tab Take 1 tablet (5 mg total) by mouth daily. 90 tablet 3    albuterol 108 (90 Base)  MCG/ACT Inhalation Aero Soln Inhale 2 puffs into the lungs every 4 (four) hours as needed for Wheezing. 1 each 0    metoprolol succinate ER 25 MG Oral Tablet 24 Hr Take 1 tablet (25 mg total) by mouth daily. 90 tablet 0    Cholecalciferol (VITAMIN D) 1000 units Oral Tab Vitamin D      levoFLOXacin 500 MG Oral Tab Take 1 tablet (500 mg total) by mouth daily. 14 tablet 0    methylPREDNISolone (MEDROL) 4 MG Oral Tablet Therapy Pack As directed. 21 tablet 0    naproxen 500 MG Oral Tab Take 1 tablet (500 mg total) by mouth 2 (two) times daily with meals. 60 tablet 0       Allergies:  Allergies[1]    ROS:   CONSTITUTIONAL: negative for fevers, chills, sweats and weight loss  EYES Negative for red eyes, yellow eyes, changes in vision  HEENT: Negative for dysphagia and hoarseness  RESPIRATORY: Negative for cough and shortness of breath  CARDIOVASCULAR: Negative for chest pain, palpitations  GASTROINTESTINAL: See HPI  GENITOURINARY: Negative for dysuria and frequency  MUSCULOSKELETAL: Negative for arthralgias and myalgias  NEUROLOGICAL: Negative for dizziness and headaches  BEHAVIOR/PSYCH: Negative for anxiety and poor appetite    PHYSICAL EXAM:   Blood pressure 149/88, pulse (!) 121, height 5' 1\" (1.549 m), weight 211 lb 8 oz (95.9 kg), last menstrual period 05/06/2024, not currently breastfeeding.    GEN: WD/WN, NAD  HEENT: Supple symmetrical, trachea midline  CV: RRR, the extremities are warm and well perfused   LUNGS: No increased work of breathing  ABDOMEN: No scars, normal bowel sounds, soft, non-tender, non-distended no rebound or guarding, no masses, no hepatomegaly  MSK: No redness, no warmth, no swelling of joints  SKIN: No jaundice, no erythema, no rashes  HEMATOLOGIC: No bleeding, no bruising  NEURO: Alert and interactive, normal gait    Labs/Imaging/Procedures:     Patient's pertinent labs and imaging were reviewed and discussed with patient today.        .  ASSESSMENT/PLAN:   Linda Minaya is a 44 year old  year-old female with history of arrhythmia, asthma, gerd, htn, migraines, oa, palpitations:     #hpylori  Pos on path from egd.  S/p treatment w/ levo based triple therapy.  Plan for eradication testing.    #heartburn  Sx a few times/week and no change s/p treatment of hpylori. Plan as below.    #constipation  Chronic. Advise miralax +fiber supplement    #fhx colon ca  #abnormal cln (adenomatous tissue at IC valve)  Last c-scope w/ incidental finding of adenomatous tissue at ic valve.  Maternal great aunt x 2 had colon ca. Mother had ovarian ca with mets to cln. Plan for cln in 3-6 mos w/ enhanced prep in march.    -c-scope w/ enhanced prep  -hpylori testing  -reflux diet modification  -Pepcid as needed  -miralax + fibercon OR citrucel    1. Schedule colonoscopy with [facility] w/ Dr. Angeles [Diagnosis: fhx colon ca, adenomatous tissue]    2.  bowel prep from pharmacy (split trilyte -Buy over the counter dulcolax laxative, and take one tablet daily for 3 days prior to drinking the bowel prep.  )    3.   For cardiology patients and patients on blood thinners:  Please contact your cardiology clinic for clearance to proceed with the endoscopic procedure. If you are on blood thinners, please also confirm with your cardiologic clinic that you are able to hold the blood thinner per our recommendations.\"    BLOOD THINNER ORDERS:  -Hold for 48 hours (Xarelto, Eliquis, Pradaxa, Savaysa)  -Hold for 3 days (Pletal)  -Hold for 5 days (Coumadin, Plavix, Brilinta, Aggrenox)  -Hold for 7 days (Effient)     For endocrinology insulin patients:    Please contact your endocrinology clinic for insulin adjustment orders prior to your endoscopic procedure.    4. Read all bowel prep instructions carefully. Bowel prep instructions can also be found online at:  [email or web address]     5. AVOID seeds, nuts, popcorn, raw fruits and vegetables for 3 days before procedure    6.  If you start any NEW medication after your visit today, please  notify us. Certain medications (like iron or weight loss medications) will need to be held before the procedure, or the procedure cannot be performed safely.            Orders This Visit:  Orders Placed This Encounter   Procedures    Helicobacter Pylori Breath Test, Adult       Meds This Visit:  Requested Prescriptions      No prescriptions requested or ordered in this encounter       Imaging & Referrals:  None    ENDOSCOPIC RISK BENEFIT DISCUSSION: I described the procedure in great detail with the patient. I discussed the risks and benefits, including but not limited to: bleeding, perforation, infection, anesthesia complications, and even death. Patient will be NPO after midnight and will have a person physically present at time of pick-up to drive patient home. Patient verbalized understanding and agrees to proceed with procedure as planned.      Lesley Haney, APRN   1/2/2025        This note was partially prepared using Dragon Medical voice recognition dictation software. As a result, errors may occur. When identified, these errors have been corrected. While every attempt is made to correct errors during dictation, discrepancies may still exist.          [1] No Known Allergies

## 2025-01-07 ENCOUNTER — OFFICE VISIT (OUTPATIENT)
Facility: CLINIC | Age: 45
End: 2025-01-07
Payer: COMMERCIAL

## 2025-01-07 VITALS
DIASTOLIC BLOOD PRESSURE: 88 MMHG | WEIGHT: 211.5 LBS | HEART RATE: 121 BPM | HEIGHT: 61 IN | BODY MASS INDEX: 39.93 KG/M2 | SYSTOLIC BLOOD PRESSURE: 149 MMHG

## 2025-01-07 DIAGNOSIS — K59.00 CONSTIPATION, UNSPECIFIED CONSTIPATION TYPE: ICD-10-CM

## 2025-01-07 DIAGNOSIS — R12 HEARTBURN: ICD-10-CM

## 2025-01-07 DIAGNOSIS — A04.8 HELICOBACTER PYLORI (H. PYLORI) INFECTION: Primary | ICD-10-CM

## 2025-01-07 DIAGNOSIS — R93.3 ABNORMAL COLONOSCOPY: ICD-10-CM

## 2025-01-07 DIAGNOSIS — Z80.0 FAMILY HISTORY OF COLON CANCER: ICD-10-CM

## 2025-01-07 PROCEDURE — 3079F DIAST BP 80-89 MM HG: CPT | Performed by: NURSE PRACTITIONER

## 2025-01-07 PROCEDURE — 3077F SYST BP >= 140 MM HG: CPT | Performed by: NURSE PRACTITIONER

## 2025-01-07 PROCEDURE — 3008F BODY MASS INDEX DOCD: CPT | Performed by: NURSE PRACTITIONER

## 2025-01-07 PROCEDURE — 99215 OFFICE O/P EST HI 40 MIN: CPT | Performed by: NURSE PRACTITIONER

## 2025-01-07 NOTE — PATIENT INSTRUCTIONS
-c-scope w/ enhanced prep  -hpylori testing  -reflux diet modification  -Pepcid as needed  -miralax + fibercon OR citrucel    1. Schedule colonoscopy with [facility] w/ Dr. Angeles [Diagnosis: fhx colon ca, adenomatous tissue]    2.  bowel prep from pharmacy (split trilyte -Buy over the counter dulcolax laxative, and take one tablet daily for 3 days prior to drinking the bowel prep.  )    3.   For cardiology patients and patients on blood thinners:  Please contact your cardiology clinic for clearance to proceed with the endoscopic procedure. If you are on blood thinners, please also confirm with your cardiologic clinic that you are able to hold the blood thinner per our recommendations.\"    BLOOD THINNER ORDERS:  -Hold for 48 hours (Xarelto, Eliquis, Pradaxa, Savaysa)  -Hold for 3 days (Pletal)  -Hold for 5 days (Coumadin, Plavix, Brilinta, Aggrenox)  -Hold for 7 days (Effient)     For endocrinology insulin patients:    Please contact your endocrinology clinic for insulin adjustment orders prior to your endoscopic procedure.    4. Read all bowel prep instructions carefully. Bowel prep instructions can also be found online at:  [email or web address]     5. AVOID seeds, nuts, popcorn, raw fruits and vegetables for 3 days before procedure    6.  If you start any NEW medication after your visit today, please notify us. Certain medications (like iron or weight loss medications) will need to be held before the procedure, or the procedure cannot be performed safely.

## 2025-01-10 ENCOUNTER — APPOINTMENT (OUTPATIENT)
Dept: OBGYN | Age: 45
End: 2025-01-10

## 2025-01-10 DIAGNOSIS — N92.0 MENORRHAGIA WITH REGULAR CYCLE: Primary | ICD-10-CM

## 2025-01-10 RX ORDER — MEDROXYPROGESTERONE ACETATE 150 MG/ML
150 INJECTION, SUSPENSION INTRAMUSCULAR ONCE
Status: COMPLETED | OUTPATIENT
Start: 2025-01-10 | End: 2025-01-10

## 2025-01-10 RX ADMIN — MEDROXYPROGESTERONE ACETATE 150 MG: 150 INJECTION, SUSPENSION INTRAMUSCULAR at 09:15

## 2025-01-12 ENCOUNTER — NURSE ONLY (OUTPATIENT)
Dept: LAB | Facility: HOSPITAL | Age: 45
End: 2025-01-12
Attending: NURSE PRACTITIONER
Payer: COMMERCIAL

## 2025-01-12 DIAGNOSIS — A04.8 HELICOBACTER PYLORI (H. PYLORI) INFECTION: ICD-10-CM

## 2025-01-12 DIAGNOSIS — M19.90 INFLAMMATORY ARTHRITIS: ICD-10-CM

## 2025-01-12 DIAGNOSIS — I10 ESSENTIAL HYPERTENSION: ICD-10-CM

## 2025-01-12 DIAGNOSIS — M47.812 CERVICAL SPINE ARTHRITIS: ICD-10-CM

## 2025-01-12 DIAGNOSIS — D50.9 MICROCYTIC ANEMIA: ICD-10-CM

## 2025-01-12 DIAGNOSIS — R73.9 HYPERGLYCEMIA: ICD-10-CM

## 2025-01-12 LAB
ALBUMIN SERPL-MCNC: 4.8 G/DL (ref 3.2–4.8)
ALBUMIN/GLOB SERPL: 1.4 {RATIO} (ref 1–2)
ALP LIVER SERPL-CCNC: 111 U/L
ALT SERPL-CCNC: 15 U/L
ANION GAP SERPL CALC-SCNC: 8 MMOL/L (ref 0–18)
AST SERPL-CCNC: 13 U/L (ref ?–34)
BILIRUB SERPL-MCNC: 0.3 MG/DL (ref 0.3–1.2)
BUN BLD-MCNC: 9 MG/DL (ref 9–23)
BUN/CREAT SERPL: 11.3 (ref 10–20)
CALCIUM BLD-MCNC: 9.7 MG/DL (ref 8.7–10.4)
CHLORIDE SERPL-SCNC: 106 MMOL/L (ref 98–112)
CO2 SERPL-SCNC: 27 MMOL/L (ref 21–32)
CREAT BLD-MCNC: 0.8 MG/DL
CRP SERPL-MCNC: 2.5 MG/DL (ref ?–1)
EGFRCR SERPLBLD CKD-EPI 2021: 93 ML/MIN/1.73M2 (ref 60–?)
EST. AVERAGE GLUCOSE BLD GHB EST-MCNC: 134 MG/DL (ref 68–126)
FASTING STATUS PATIENT QL REPORTED: YES
GLOBULIN PLAS-MCNC: 3.5 G/DL (ref 2–3.5)
GLUCOSE BLD-MCNC: 95 MG/DL (ref 70–99)
HBA1C MFR BLD: 6.3 % (ref ?–5.7)
OSMOLALITY SERPL CALC.SUM OF ELEC: 290 MOSM/KG (ref 275–295)
POTASSIUM SERPL-SCNC: 3.4 MMOL/L (ref 3.5–5.1)
PROT SERPL-MCNC: 8.3 G/DL (ref 5.7–8.2)
SODIUM SERPL-SCNC: 141 MMOL/L (ref 136–145)

## 2025-01-12 PROCEDURE — 85060 BLOOD SMEAR INTERPRETATION: CPT | Performed by: INTERNAL MEDICINE

## 2025-01-12 PROCEDURE — 83036 HEMOGLOBIN GLYCOSYLATED A1C: CPT | Performed by: INTERNAL MEDICINE

## 2025-01-12 PROCEDURE — 86140 C-REACTIVE PROTEIN: CPT | Performed by: INTERNAL MEDICINE

## 2025-01-12 PROCEDURE — 85025 COMPLETE CBC W/AUTO DIFF WBC: CPT | Performed by: INTERNAL MEDICINE

## 2025-01-12 PROCEDURE — 80053 COMPREHEN METABOLIC PANEL: CPT | Performed by: INTERNAL MEDICINE

## 2025-01-13 LAB
BASOPHILS # BLD AUTO: 0.07 X10(3) UL (ref 0–0.2)
BASOPHILS NFR BLD AUTO: 0.7 %
DEPRECATED RDW RBC AUTO: 37.9 FL (ref 35.1–46.3)
EOSINOPHIL # BLD AUTO: 0.1 X10(3) UL (ref 0–0.7)
EOSINOPHIL NFR BLD AUTO: 0.9 %
ERYTHROCYTE [DISTWIDTH] IN BLOOD BY AUTOMATED COUNT: 15.6 % (ref 11–15)
HCT VFR BLD AUTO: 36.8 %
HGB BLD-MCNC: 11.7 G/DL
IMM GRANULOCYTES # BLD AUTO: 0.04 X10(3) UL (ref 0–1)
IMM GRANULOCYTES NFR BLD: 0.4 %
LYMPHOCYTES # BLD AUTO: 1.97 X10(3) UL (ref 1–4)
LYMPHOCYTES NFR BLD AUTO: 18.5 %
MCH RBC QN AUTO: 21.9 PG (ref 26–34)
MCHC RBC AUTO-ENTMCNC: 31.8 G/DL (ref 31–37)
MCV RBC AUTO: 68.8 FL
MONOCYTES # BLD AUTO: 0.58 X10(3) UL (ref 0.1–1)
MONOCYTES NFR BLD AUTO: 5.4 %
NEUTROPHILS # BLD AUTO: 7.89 X10 (3) UL (ref 1.5–7.7)
NEUTROPHILS # BLD AUTO: 7.89 X10(3) UL (ref 1.5–7.7)
NEUTROPHILS NFR BLD AUTO: 74.1 %
PLATELET # BLD AUTO: 542 10(3)UL (ref 150–450)
RBC # BLD AUTO: 5.35 X10(6)UL
WBC # BLD AUTO: 10.7 X10(3) UL (ref 4–11)

## 2025-01-14 ENCOUNTER — TELEPHONE (OUTPATIENT)
Dept: GASTROENTEROLOGY | Facility: CLINIC | Age: 45
End: 2025-01-14

## 2025-01-14 DIAGNOSIS — A04.8 HELICOBACTER PYLORI (H. PYLORI) INFECTION: Primary | ICD-10-CM

## 2025-01-14 LAB — H PYLORI BREATH TEST: POSITIVE

## 2025-01-14 RX ORDER — BISMUTH SUBSALICYLATE 262 MG/1
2 TABLET, CHEWABLE ORAL 4 TIMES DAILY
Qty: 112 TABLET | Refills: 0 | Status: SHIPPED | OUTPATIENT
Start: 2025-01-14 | End: 2025-01-28

## 2025-01-14 RX ORDER — TETRACYCLINE HYDROCHLORIDE 500 MG/1
500 CAPSULE ORAL 4 TIMES DAILY
Qty: 56 CAPSULE | Refills: 0 | Status: SHIPPED | OUTPATIENT
Start: 2025-01-14 | End: 2025-01-28

## 2025-01-14 RX ORDER — METRONIDAZOLE 250 MG/1
250 TABLET ORAL 4 TIMES DAILY
Qty: 56 TABLET | Refills: 0 | Status: SHIPPED | OUTPATIENT
Start: 2025-01-14 | End: 2025-01-28

## 2025-01-14 NOTE — TELEPHONE ENCOUNTER
Patient outreach updated for patient to complete repeat h.pylori test in 2 months (3/2025) per Lesley lubin

## 2025-01-14 NOTE — TELEPHONE ENCOUNTER
Pt pos for hpylori following levo based triple.  Plan to treat with quadruple therapy  Peptobismuth interacts with methotrexate and she will hold therapy while completing treatment for hpylori. She will restart methotrexate after finishing hpylori therapy.    Plan for eradication testing in 2 mos.

## 2025-01-16 ENCOUNTER — OFFICE VISIT (OUTPATIENT)
Dept: NEUROLOGY | Facility: CLINIC | Age: 45
End: 2025-01-16
Payer: COMMERCIAL

## 2025-01-16 VITALS — WEIGHT: 211 LBS | HEIGHT: 61 IN | BODY MASS INDEX: 39.84 KG/M2

## 2025-01-16 DIAGNOSIS — R29.898 WEAKNESS OF BOTH SHOULDERS: ICD-10-CM

## 2025-01-16 DIAGNOSIS — M54.2 NECK PAIN: ICD-10-CM

## 2025-01-16 DIAGNOSIS — G43.009 MIGRAINE WITHOUT AURA AND WITHOUT STATUS MIGRAINOSUS, NOT INTRACTABLE: ICD-10-CM

## 2025-01-16 DIAGNOSIS — M54.32 BILATERAL SCIATICA: Primary | ICD-10-CM

## 2025-01-16 DIAGNOSIS — M54.31 BILATERAL SCIATICA: Primary | ICD-10-CM

## 2025-01-16 DIAGNOSIS — G25.2 ACTION TREMOR: ICD-10-CM

## 2025-01-16 DIAGNOSIS — G47.00 INSOMNIA, UNSPECIFIED TYPE: ICD-10-CM

## 2025-01-16 DIAGNOSIS — M79.2 NEUROPATHIC PAIN: ICD-10-CM

## 2025-01-16 PROCEDURE — 99214 OFFICE O/P EST MOD 30 MIN: CPT | Performed by: OTHER

## 2025-01-16 PROCEDURE — 3008F BODY MASS INDEX DOCD: CPT | Performed by: OTHER

## 2025-01-16 RX ORDER — TOPIRAMATE 50 MG/1
50 TABLET, FILM COATED ORAL 2 TIMES DAILY
Qty: 180 TABLET | Refills: 1 | Status: SHIPPED | OUTPATIENT
Start: 2025-01-16 | End: 2025-07-15

## 2025-01-16 RX ORDER — RIMEGEPANT SULFATE 75 MG/75MG
75 TABLET, ORALLY DISINTEGRATING ORAL AS NEEDED
Qty: 8 TABLET | Refills: 11 | Status: SHIPPED | OUTPATIENT
Start: 2025-01-16 | End: 2026-01-16

## 2025-01-16 RX ORDER — PREGABALIN 150 MG/1
150 CAPSULE ORAL 2 TIMES DAILY
Qty: 180 CAPSULE | Refills: 1 | Status: SHIPPED | OUTPATIENT
Start: 2025-01-16 | End: 2025-07-15

## 2025-01-16 RX ORDER — AMITRIPTYLINE HYDROCHLORIDE 150 MG/1
150 TABLET ORAL NIGHTLY
Qty: 90 TABLET | Refills: 3 | Status: SHIPPED | OUTPATIENT
Start: 2025-04-02 | End: 2026-04-02

## 2025-01-16 NOTE — PROGRESS NOTES
[facility]  Neurology clinic follow-up note  Reason for visit:  tremors, pain, and migraines  PCP: Og Davison MD    Chief Complaint: Tremors (LOV 04/02/2024 Pt presents with action tremors and neck pain. Pt states her pain began about three months ago. Pt is taking medications for tremors and does need a refill on her pregabalin 75 MG Oral Cap,Amitriptyline HCl 150 MG Oral Tab and Medication, and Rimegepant Sulfate (NURTEC) 75 MG Oral Tablet.    //)    HPI:  Linda Minaya is a 44 year old right-handed woman w/ a pmhx of thyroid nodules, hypertension, Hx left-sided action tremor/lip tremor, left L3 neuroforaminal stenosis, chronic low back pain, migraine without aura, and left-sided carpal tunnel syndrome evaluated via video visit visit for left sided tremors, low back pain, chronic insomnia, and migraine headaches.    At her April 2024 visit started Topamax for migraine (first line), tremors (third line) and neuropathic pain (third line).  Add nurtec for rescue. Stop gabapentin (for tremors and neuropathic pain) and switch to lyrica (for tremors and neuropathic pain). Cont elavil. Offered doxazosin, which she declined.    01/16/25 Interval history/subjective  Here  in follow up for insomnia, neck pain, low back pain, headaches,  paraesthesias, and  tremors.    Her headaches, insomnia, and tremors are stable.  She has new neck pain,  Woke up one morning 3 mo ago and had a crick in her neck.  She has decreased rom  She has pain and weakness in her neck and shoulders.  She is not dropping  objects.  Her hands feels really weak.  She had a xray of her c spine by her rheumatologist  Dr. Monson   She is interested in PT.    She has new low back pain that radiates down the posterior aspect of both legs.  Cannot sleep on her back, must sleep on her side. Has difficulty and pain getting out of a car now.    Lyrica helps w/ the low  back pain.    She would like to increase the dose of her  lyrica.    04/02/24  Interval history/subjective:  Here  in follow up for insomnia, paraesthesias, and  tremors.  Tremors mainly left hand and left side of her mouth.  She saw her rheumatologist who also recommended shed f/u w/ neurology for her pain;  When he hand shakes she described it as \"pill rolling\"    When she holds up her hand her little finger separates and shakes.    She has sx of neuropathy  Her hands and feet are tingling.    Reports the left side of her body is weak.    Describes her muscle pain as burning.    Has seen Dr. Behar in the past, will to try trigger point injections for pain.        02/11/22 Interval history/subjective:  LCV was 8/25/2021; had a phone follow-up for amitriptyline for insomnia    Since then she saw Dr. Behar (physical medicine rehab) on 9/1/2021: She had left-sided radicular pain and weakness in her left EHL.  She was started on Flexeril and diclofenac, referred to PT, and referred to bariatrics for weight loss.    She then saw Dr. Camacho (orthopedic surgery) in January 2022 after she fell twice on the ice and developed left knee pain.  Recommended HEP.    Ms. Minaya conducted the video visit at her place of employment. She was in an open office area.  She was seated in her cubicle.  Her coworker was in the cubicle next to her. There was also at least one other person in the room with her.  I asked Ms. Minaya if she was comfortable discussing all her medical history with other people in the room who were not her family members.  She states she had no issues.  I advised her that her personal medical history would be known to everyone who is able to hear our conversation.  I asked her if she would like to conduct her visit in a more private location.  She said that she had no objections and was \"okay with doing the video visit right here.  I do not care if they can hear.\"    Reports having tremors in her left hand and lip x 1 yr; initially was just left thumb and her upper  lip. 6 mo ago involved her entire left hand.  \"sometimes,\" occurs simultaneously other times can be just the lip or just the left hand. Now involves her whole hand.  Handwriting is shakier not smaller  She stops what she is doing when it happens b/c of the tremor  \" Tremor is scary \"  No anosmia  No hypophonia; feels like she gets hoarse/raspy.    Her coworker states that Ms. Mejías tremor appears like \"someone who has the nervous shakes.\"  Stated that she can see it when she looks over from her cubicle at Ms. Minaya.    Reports she has difficulty swallowing  Reports globus sensation  Reports sometimes \"it comes back up.\" regurgitates food 3-4x a month.    Far vision is worse; it is blurrier; ongoing for 1 yr; last saw optometrist ([facility]) 1 yr ago notices it while driving,   Does not affect her ability to drive  Reports transient flashes in her vision.  Random patterns;     She reports that she fell 3 weeks ago; just went down. Fell forwards.  Unclear if he is referring to her recent fall in January (which was attributed to walking on the ice).    The Migraine Disability Assessment Test (MIDAS)    Note: Select zero if  they did not have the activity in the last 3 months.     On how many days in the last 3 months did you miss work or school because of your headaches?   0 days.    How many days in the last 3 months was your productivity at work or school reduced by half or more because of your headaches? (Do not include days you counted in question 1 where you missed work or school.)  5 days.     On how many days in the last 3 months did you not do household work (such as housework, home repairs and maintenance, shopping, caring for children and relatives) because of your headaches?  5 days.     How many days in the last 3 months was your productivity in household work reduced by half of more because of your headaches? (Do not include days you counted in question 3 where you did not do household work.)  2  days.     On how many days in the last 3 months did you miss family, social or leisure activities because of your headaches?  0 days.      Total (Questions 1-5): 12 d             A.On how many days in the last 3 months did you have a headache? (If a headache lasted more than 1  day, count each day.)  36 headache days in a 3 mo period     B.On a scale of 0 - 10, on average how painful were these headaches? (where 0=no pain at all, and 10= pain as bad as it can be.)  Reports 10/10 pain.     Scoring:  After you have filled out this questionnaire, add the total number of days from questions 1-5 (ignore A and B).      MIDAS Grade  Definition  MIDAS Score       I        Little or No Disability     0-5          II     Mild Disability     6-10          III     Moderate Disability     11-20          IV     Severe Disability     21+          © [employer], 1997 © 2007, [employer]. All Rights reserved.     Prior failed migraine medications:  1. Sumatriptan 50 mg    Reports she cannot hear as well in her left ear; happens spontaneously and resolves spontaneously.     Not taking excedrin migraine >15x in a 30 day period. Also takes Tylenol; takes it 1-2x a mo w/ her cycle.    Wakes up feeling well rested. Does not wake up with a headache. Does not wake up in the middle night w// a HA. Does not wake up w/ cognitive sx.    [place] Sleepiness Scale: 0    08/25/21 interval history:  We discussed amitriptyline can increase the risk of cardiac arrhythmias.  We discussed all the adverse effects of amitriptyline.  Explained that amitriptyline and nortriptyline can be used to treat neuropathic pain, and can cause sedation, but the main goal of these medications is not to use them to treat insomnia.  Ms. Minaya reports that her insomnia is secondary to her low back pain which is a follow-up visit with Dr. Behar.  She also has a follow-up visit scheduled with this author in September.  Discussed  doses of amitriptyline.    provided refill.  Answered all questions.        Review and summation of prior records  12/4/2018 neurology consult note from [facility]: She presented with low back pain that of been ongoing for a year, and radiated down both of her legs.  She had a trial of physical therapy at the time but did not have any significant improvement.  On exam she had distal lower extremity pain that limited her ability to participate as well as weakness.  Her MRI demonstrated a disc bulge at L3 with some foraminal encroachment.  The patient's nortriptyline was uptitrated and she was given a Medrol Dosepak for acute management.  She was referred to PMR.  6/12/2019 she saw Dr. Behar for her knee pain.  10/16/2019 neurology clinic follow-up: Patient reported headaches with migrainous features, left hemibody sensory symptoms and a a lip tremor.  On exam she had decreased sensation to pinprick in the left side of her face, flattening of the left nasolabial fold, mild dysarthria, 4+ / 5 strength in her deltoids, 4/5 strength in her left hip flexors pain when testing deltoids, and a mild action tremor bilaterally, worse on the left.  Her sensation was decreased to pinprick in the left arm and leg.  She was noted to be hyperreflexic at her biceps bilaterally.  She had 1-2 beats of ankle clonus bilaterally as well as a positive Yosvany sign bilaterally.  Her toes were downgoing.  There is concerned she may have a central process, specifically demyelinating disease, given no clear peripheral localization, an MRI of the brain was ordered.  As well as an MRI of the cervical spine.  Per Dr. Gutierrez's personal read of the MRI of the  L-spine she had lumbar spinal stenosis.  1/7/2020 office visit: Intermittent tremor still persisted, at times making it difficult for her screw off the lid.  Reported dropping things.  Also had difficulty with fine motor tasks.  Reported lateral bulge and at times while walking.  Reported having  photophobia secondary to her migraines.  An EMG was scheduled for her carpal tunnel syndrome.      ROS:  Pertinent positive and negatives per HPI.  All others were reviewed and negative.      Past Medical History:    Anesthesia complication    increase heart rate after anesthesia    Arrhythmia    TACHYCARDIA    Asthma (HCC)    Back problem    Colon polyp    repeat CLN in     Constipation    Disorder of thyroid    thyroid nodules    Esophageal reflux    Essential hypertension    Helicobacter positive gastritis    on abx    High blood pressure    Migraines    Osteoarthritis    RA    Palpitations    Visual impairment    GLASSES         Current Outpatient Medications:   Current Outpatient Medications   Medication Instructions    albuterol 108 (90 Base) MCG/ACT Inhalation Aero Soln 2 puffs, Inhalation, Every 4 hours PRN    Amitriptyline HCl (ELAVIL) 150 mg, Oral, Nightly    amLODIPine (NORVASC) 5 mg, Oral, Daily    Cholecalciferol (VITAMIN D) 1000 units Oral Tab Vitamin D    fluticasone propionate 50 MCG/ACT Nasal Suspension 2 sprays, Nasal, Daily    folic acid (FOLVITE) 1 mg, Oral, Daily    methotrexate 2.5 MG Oral Tab Start 4 pills weekly for 2 weeks then increase to 6 pills weekly    metoprolol succinate ER (TOPROL XL) 25 mg, Oral, Daily    Omeprazole 40 mg, Oral, Daily    PEG 3350-KCl-Na Bicarb-NaCl (TRILYTE) 420 g Oral Recon Soln Take prep as directed by gastro office. May substitute with Trilyte/generic equivalent if needed.    predniSONE 10 MG Oral Tab 30 mg daily x3 days then 20 mg daily x3 days then 10 mg daily x3 days then stay on 5 mg daily    tranexamic acid (LYSTEDA) 1,300 mg, Oral, 3 times daily   No Known Allergies    Past Surgical History:   Procedure Laterality Date    Appendectomy      may 2024    Arthroscopy of joint unlisted Left     knee x 2          5 yrs ago    Colonoscopy N/A 2024    Procedure: COLONOSCOPY;  Surgeon: NATALI Angeles MD;  Location: [facility] ENDOSCOPY    Tubal  ligation      2021       Family History   Problem Relation Age of Onset    Hypertension Father     Diabetes Mother     Other (hepatitis) Mother         hepatitis C    No Known Problems Son     Colon Polyps Maternal Grandmother     Diabetes Maternal Grandmother     Heart Disorder Maternal Grandmother 40        atrial fibrillation, heart problems in cousins, some who smoke    Other (hepatitis C) Maternal Grandmother     Breast Cancer Maternal Aunt 40    Cancer Other 40        breast cancer    Other (breast cancer) Other         40's    Other (colon cancer) Other         40's       Social history:  History   Smoking Status    Never   Smokeless Tobacco    Never     History   Alcohol Use No     Comment: RARE     History   Drug Use No       Family History   Problem Relation Age of Onset    Hypertension Father     Diabetes Mother     Other (hepatitis) Mother         hepatitis C    No Known Problems Son     Colon Polyps Maternal Grandmother     Diabetes Maternal Grandmother     Heart Disorder Maternal Grandmother 40        atrial fibrillation, heart problems in cousins, some who smoke    Other (hepatitis C) Maternal Grandmother     Breast Cancer Maternal Aunt 40    Cancer Other 40        breast cancer    Other (breast cancer) Other         40's    Other (colon cancer) Other         40's       Objective:    Last vitals and weight :  There were no vitals filed for this visit.      Exam:  - General: appears stated age and no distress  - Pulmonary: no signs of respiratory distress. Normal excursion of the chest.    Neurologic Exam  - Mental Status: Alert and attentive.  Pleasant and cooperative.  Fund of knowledge are excellent..  Speech is spontaneous, fluent, and prosodic. Comprehension and repetition intact. Phrase length and rate are normal.  - Cranial Nerves: No gaze preference. Visual fields: Able to see the examiner's entire face.  Pupils appear to be symmetric.  They appear to be equally round and reactive when the  patient opens and closes eyes.  EOMI. No nystagmus. No ptosis. With self palpation she reports all 3 branches of trigeminal are intact bilaterally to light touch.No pathological facial asymmetry. No flattening of the nasolabial fold. .  No tremor in her upper lip was appreciated.  She is able to elevate and depress her brow without any sign of weakness.  She can bury her eyelids when she closes them tightly.  She can puff her cheeks symmetrically.  Hearing grossly intact.  Tongue midline.Palate and uvula elevate symmetrically.  Shoulder shrug symmetric.  No head tilt.       - Motor:  normal tone/bulk. No interosseous wasting. No flattening of hypothenar eminences.      Right Left     Motor Strength   Deltoids 4 4  Triceps 4 4  Biceps 4+ 4+  Wrist Extensors 3 to 4- 5   5 5   Hip Flexors 5 5   Knee extensors 5 5     Reflexes are  1+ except in her L UE where they are 2+ throughout.       Her finger taps are equal and symmetric.  There is no decrementation or fatiguing.  Rapid movements are symmetric.    Pronator drift: No pronator drift    Asterixis: No asterixis noted.     - Sensory:    Light touch: normal  - Cerebellum: No truncal ataxia. No titubations. No dysmetria, no dysdiadochokinesis.          Data reviewed    Most recent lab results:       Test results/Imaging:     Lab Results   Component Value Date    TSH 0.673 03/11/2024     Lab Results   Component Value Date    HDL 48 10/28/2023     (H) 10/28/2023    TRIG 82 10/28/2023     Lab Results   Component Value Date    HGB 11.7 (L) 01/12/2025    HCT 36.8 01/12/2025    MCV 68.8 (L) 01/12/2025    WBC 10.7 01/12/2025    .0 (H) 01/12/2025      Lab Results   Component Value Date    BUN 9 01/12/2025    CA 9.7 01/12/2025    ALT 15 01/12/2025    AST 13 01/12/2025    ALB 4.8 01/12/2025     01/12/2025    K 3.4 (L) 01/12/2025     01/12/2025    CO2 27.0 01/12/2025      I have reviewed labs.    Performed an independent visualization of MRI of the  lumbar spine, MRI of the brain, MRI of the C-spine  Imaging revealed:   MRI of the lumbar spine demonstrates lateral neural foraminal stenosis/recess narrowing at L3.  There is straightening of the normal lumbar lordosis.  Otherwise there is no central canal stenosis.  No significant disc bulges.  MRI of the brain is unremarkable for any signs of demyelinating disease.  MRI of the C-spine is unremarkable.          Linda Minaya is a very pleasant  44 year old right-handed woman w/ a pmhx of thyroid nodules, hypertension, L3 neuroforaminal stenosis, chronic low back pain, migraine, left-sided action tremors, transient left hemiparesis/hemisensory loss, and left-sided carpal tunnel syndrome evaluated for return of her left-sided tremors (now worse w/ pill rolling), neck pain, muscle pain, chronic low back pain, migraines, and chronic insomnia.  Her grandmother  and mother have Parkinson disease.     Patient reports that her handwriting is now shakier, her right hand.  She did not specifically mention any right-sided tremors, and none were appreciated on exam.  On exam she had a postural myoclonic tremor left hand.  No rigidity. She pérez not have sx of PD. Remainder of exam is nonfocal.    Etiology of her tremor is unclear.  one possibility she may have a myoclonic or dystonic tremor.  Treatment for dystonic tremor is the same as essential tremor.  Would avoid propranolol given her use of inhaler and ?possible asthma.  Second line medication is gabapentin, which he said has not helped her back pain in the past.  However she is never been on the prescribed dose (300 mg 3 times daily).  Hopefully this can help her tremor and improve her chronic low back pain.      Her migraines remain disabling.  She is having at least 12 headache days a month.  Sumatriptan did not provide relief, and she has been using Excedrin Migraine  as rescue.will switch her to Nurtec for rescue.  She is not on a prophylactic agent.  Consider using  Topamax, but it may not be as effective for neuropathic pain.      Her low back pain is  MORE bothersome than her migraine pain, therefore we will start her on gabapentin.  Stressed importance of lifestyle changes to decrease her migraine frequency including maintaining hydration (specifically advised her to drink 88 ounces of water a day and to keep track of how much water she drinks), regular sleep schedule, and not skipping meals.  We will continue amitriptyline for insomnia.  Screen for RAYRAY, her [place] Sleepiness Scale score was 0.  In the future will refer to sleep psychology.  She is aware of the adverse effects of amitriptyline.              1. Bilateral sciatica  - pregabalin 150 MG Oral Cap; Take 1 capsule (150 mg total) by mouth 2 (two) times daily.  Dispense: 180 capsule; Refill: 1  - MRI SPINE LUMBAR (CPT=72148); Future  - Physical Therapy Referral - [facility]    2. Migraine without aura and without status migrainosus, not intractable  - Amitriptyline HCl 150 MG Oral Tab; Take 1 tablet (150 mg total) by mouth nightly.  Dispense: 90 tablet; Refill: 3  - Rimegepant Sulfate (NURTEC) 75 MG Oral Tablet Dispersible; Take 75 mg by mouth as needed. Take one tablet at onset of migraine.  Maximum dose in 24 hours is 1 tablet (75mg).  Dispense: 8 tablet; Refill: 11  - topiramate 50 MG Oral Tab; Take 1 tablet (50 mg total) by mouth 2 (two) times daily.  Dispense: 180 tablet; Refill: 1    3. Neuropathic pain  - Amitriptyline HCl 150 MG Oral Tab; Take 1 tablet (150 mg total) by mouth nightly.  Dispense: 90 tablet; Refill: 3  - pregabalin 150 MG Oral Cap; Take 1 capsule (150 mg total) by mouth 2 (two) times daily.  Dispense: 180 capsule; Refill: 1  - topiramate 50 MG Oral Tab; Take 1 tablet (50 mg total) by mouth 2 (two) times daily.  Dispense: 180 tablet; Refill: 1  - MRI SPINE CERVICAL (CPT=72141); Future  - MRI SPINE LUMBAR (CPT=72148); Future    4. Neck pain  - pregabalin 150 MG Oral Cap; Take 1 capsule  (150 mg total) by mouth 2 (two) times daily.  Dispense: 180 capsule; Refill: 1  - MRI SPINE CERVICAL (CPT=72141); Future  - Physical Therapy Referral - [facility] [place]    5. Insomnia, unspecified type  - Amitriptyline HCl 150 MG Oral Tab; Take 1 tablet (150 mg total) by mouth nightly.  Dispense: 90 tablet; Refill: 3    6. Action tremor  - topiramate 50 MG Oral Tab; Take 1 tablet (50 mg total) by mouth 2 (two) times daily.  Dispense: 180 tablet; Refill: 1    7. Weakness of both shoulders  - MRI SPINE CERVICAL (CPT=72141); Future                Education/Instructions given to: patient   Barriers to Learning: none  Content: Refer to note above.  Evaluation/Outcome: Verbalized understanding    This document is not intended to support charting by exception.  Sections left blank in a completed note should be presumed not to have been done.        Education and counseling provided to patient. Instructed patient to call my office or seek medical attention immediately if symptoms worsen.  Patient verbalized understanding of information given. All questions were answered. All side effects of drugs were discussed.                   Dougie Lam DO  Vascular and General Neurology   01/16/25

## 2025-01-20 ENCOUNTER — TELEPHONE (OUTPATIENT)
Dept: NEUROLOGY | Facility: CLINIC | Age: 45
End: 2025-01-20

## 2025-01-31 ENCOUNTER — HOSPITAL ENCOUNTER (OUTPATIENT)
Dept: MRI IMAGING | Facility: HOSPITAL | Age: 45
Discharge: HOME | End: 2025-01-31
Attending: Other
Payer: COMMERCIAL

## 2025-01-31 DIAGNOSIS — M54.32 BILATERAL SCIATICA: ICD-10-CM

## 2025-01-31 DIAGNOSIS — M79.2 NEUROPATHIC PAIN: ICD-10-CM

## 2025-01-31 DIAGNOSIS — M54.31 BILATERAL SCIATICA: ICD-10-CM

## 2025-01-31 PROCEDURE — 72148 MRI LUMBAR SPINE W/O DYE: CPT | Performed by: OTHER

## 2025-02-01 ENCOUNTER — HOSPITAL ENCOUNTER (OUTPATIENT)
Dept: MRI IMAGING | Facility: HOSPITAL | Age: 45
Discharge: HOME | End: 2025-02-01
Attending: Other
Payer: COMMERCIAL

## 2025-02-01 DIAGNOSIS — M79.2 NEUROPATHIC PAIN: ICD-10-CM

## 2025-02-01 DIAGNOSIS — M54.2 NECK PAIN: ICD-10-CM

## 2025-02-01 DIAGNOSIS — R29.898 WEAKNESS OF BOTH SHOULDERS: ICD-10-CM

## 2025-02-01 PROCEDURE — 72141 MRI NECK SPINE W/O DYE: CPT | Performed by: OTHER

## 2025-02-10 ENCOUNTER — TELEPHONE (OUTPATIENT)
Facility: CLINIC | Age: 45
End: 2025-02-10

## 2025-03-03 ENCOUNTER — APPOINTMENT (OUTPATIENT)
Dept: OBGYN | Age: 45
End: 2025-03-03

## 2025-03-08 ENCOUNTER — NURSE ONLY (OUTPATIENT)
Dept: LAB | Age: 45
End: 2025-03-08
Attending: NURSE PRACTITIONER
Payer: COMMERCIAL

## 2025-03-08 DIAGNOSIS — A04.8 HELICOBACTER PYLORI (H. PYLORI) INFECTION: ICD-10-CM

## 2025-03-08 PROCEDURE — 83013 H PYLORI (C-13) BREATH: CPT

## 2025-03-11 ENCOUNTER — TELEPHONE (OUTPATIENT)
Dept: GASTROENTEROLOGY | Facility: CLINIC | Age: 45
End: 2025-03-11

## 2025-03-11 LAB — H PYLORI BREATH TEST: NEGATIVE

## 2025-03-11 NOTE — TELEPHONE ENCOUNTER
I spoke to the patient    RN informed patient that she is due to complete gallbladder ultrasound    Patient states that she has central scheduling phone number and will call to schedule ultrasound    Patient verbalized understanding and has no further questions at this time

## 2025-04-10 ENCOUNTER — APPOINTMENT (OUTPATIENT)
Dept: OBGYN | Age: 45
End: 2025-04-10

## 2025-04-10 ENCOUNTER — E-ADVICE (OUTPATIENT)
Dept: OBGYN | Age: 45
End: 2025-04-10

## 2025-04-10 VITALS
OXYGEN SATURATION: 98 % | SYSTOLIC BLOOD PRESSURE: 158 MMHG | BODY MASS INDEX: 39.62 KG/M2 | HEIGHT: 62 IN | DIASTOLIC BLOOD PRESSURE: 98 MMHG | TEMPERATURE: 98.4 F | HEART RATE: 125 BPM | WEIGHT: 215.3 LBS

## 2025-04-10 DIAGNOSIS — Z01.419 GYNECOLOGIC EXAM NORMAL: Primary | ICD-10-CM

## 2025-04-10 DIAGNOSIS — Z30.42 SURVEILLANCE FOR DEPO-PROVERA CONTRACEPTION: ICD-10-CM

## 2025-04-10 DIAGNOSIS — Z12.31 SCREENING MAMMOGRAM FOR BREAST CANCER: ICD-10-CM

## 2025-04-10 DIAGNOSIS — N93.9 ABNORMAL UTERINE BLEEDING (AUB): ICD-10-CM

## 2025-04-10 DIAGNOSIS — R30.0 DYSURIA: ICD-10-CM

## 2025-04-10 DIAGNOSIS — D25.9 UTERINE LEIOMYOMA, UNSPECIFIED LOCATION: ICD-10-CM

## 2025-04-10 LAB
APPEARANCE, POC: CLEAR
BILIRUB UR QL STRIP: NEGATIVE
COLOR UR: YELLOW
GLUCOSE UR-MCNC: ABNORMAL MG/DL
HGB UR QL STRIP: ABNORMAL
KETONES UR STRIP-MCNC: NEGATIVE MG/DL
LEUKOCYTE ESTERASE UR QL STRIP: NEGATIVE
NITRITE UR QL STRIP: NEGATIVE
PH UR: 5.5 [PH] (ref 5–7)
PROT UR-MCNC: ABNORMAL MG/DL
SP GR UR: 1.03 (ref 1–1.03)
UROBILINOGEN UR-MCNC: 0.2 MG/DL (ref 0–1)

## 2025-04-10 PROCEDURE — 87086 URINE CULTURE/COLONY COUNT: CPT | Performed by: CLINICAL MEDICAL LABORATORY

## 2025-04-10 RX ORDER — MEDROXYPROGESTERONE ACETATE 150 MG/ML
150 INJECTION, SUSPENSION INTRAMUSCULAR
COMMUNITY
End: 2025-04-10 | Stop reason: SDUPTHER

## 2025-04-10 RX ORDER — RIMEGEPANT SULFATE 75 MG/75MG
75 TABLET, ORALLY DISINTEGRATING ORAL
COMMUNITY
Start: 2025-01-16 | End: 2026-01-16

## 2025-04-10 RX ORDER — MEDROXYPROGESTERONE ACETATE 150 MG/ML
150 INJECTION, SUSPENSION INTRAMUSCULAR
Qty: 1 ML | Refills: 4 | Status: SHIPPED | OUTPATIENT
Start: 2025-04-10

## 2025-04-10 RX ORDER — TOPIRAMATE 50 MG/1
50 TABLET, FILM COATED ORAL
COMMUNITY
Start: 2025-01-16 | End: 2025-07-15

## 2025-04-10 RX ADMIN — MEDROXYPROGESTERONE ACETATE 150 MG: 150 INJECTION, SUSPENSION INTRAMUSCULAR at 10:30

## 2025-04-10 ASSESSMENT — PATIENT HEALTH QUESTIONNAIRE - PHQ9
CLINICAL INTERPRETATION OF PHQ2 SCORE: NO FURTHER SCREENING NEEDED
1. LITTLE INTEREST OR PLEASURE IN DOING THINGS: NOT AT ALL
2. FEELING DOWN, DEPRESSED OR HOPELESS: NOT AT ALL
SUM OF ALL RESPONSES TO PHQ9 QUESTIONS 1 AND 2: 0
SUM OF ALL RESPONSES TO PHQ9 QUESTIONS 1 AND 2: 0

## 2025-04-11 LAB — BACTERIA UR CULT: NORMAL

## 2025-04-11 RX ORDER — MEDROXYPROGESTERONE ACETATE 150 MG/ML
150 INJECTION, SUSPENSION INTRAMUSCULAR ONCE
Status: COMPLETED | OUTPATIENT
Start: 2025-04-11 | End: 2025-04-10

## 2025-04-28 ENCOUNTER — TELEMEDICINE (OUTPATIENT)
Dept: NEUROLOGY | Facility: CLINIC | Age: 45
End: 2025-04-28
Payer: COMMERCIAL

## 2025-04-28 DIAGNOSIS — M54.32 BILATERAL SCIATICA: Primary | ICD-10-CM

## 2025-04-28 DIAGNOSIS — M54.2 NECK PAIN: ICD-10-CM

## 2025-04-28 DIAGNOSIS — M79.2 NEUROPATHIC PAIN: ICD-10-CM

## 2025-04-28 DIAGNOSIS — G47.00 INSOMNIA, UNSPECIFIED TYPE: ICD-10-CM

## 2025-04-28 DIAGNOSIS — M54.31 BILATERAL SCIATICA: Primary | ICD-10-CM

## 2025-04-28 DIAGNOSIS — G43.009 MIGRAINE WITHOUT AURA AND WITHOUT STATUS MIGRAINOSUS, NOT INTRACTABLE: ICD-10-CM

## 2025-04-28 RX ORDER — AMITRIPTYLINE HYDROCHLORIDE 150 MG/1
150 TABLET ORAL NIGHTLY
Qty: 90 TABLET | Refills: 3 | Status: SHIPPED | OUTPATIENT
Start: 2025-04-28 | End: 2026-04-28

## 2025-04-28 RX ORDER — PREDNISONE 20 MG/1
TABLET ORAL
Qty: 24 TABLET | Refills: 0 | Status: SHIPPED | OUTPATIENT
Start: 2025-04-28 | End: 2025-05-10

## 2025-04-28 RX ORDER — TRAMADOL HYDROCHLORIDE 50 MG/1
50 TABLET ORAL NIGHTLY PRN
Qty: 7 TABLET | Refills: 0 | Status: SHIPPED | OUTPATIENT
Start: 2025-04-28 | End: 2025-05-05

## 2025-04-28 NOTE — PROGRESS NOTES
[facility]  Neurology clinic follow-up video visit note  I conducted a telehealth visit with Linda Minaya today, 04/28/25, which was completed using two-way, real-time interactive audio and video communication. This has been done in good grace to provide continuity of care in the best interest of the provider-patient relationship, due to the COVID -19 public health crisis/national emergency where restrictions of face-to-face office visits are ongoing. Every conscious effort was taken to allow for sufficient and adequate time to complete the visit.  The patient was made aware of the limitations of the telehealth visit, including treatment limitations as no physical exam could be performed.  The patient was advised to call 911 or to go to the ER in case there was an emergency.  The patient was also advised of the potential privacy & security concerns related to the telehealth platform.   The patient was made aware of where to find [facility]'s notice of privacy practices, telehealth consent form and other related consent forms and documents.  which are located on the [facility] website. The patient verbally agreed to telehealth consent form, related consents and the risks discussed.    Lastly, the patient confirmed that they were in [place].   Included in this visit, time may have been spent reviewing labs, medications, radiology tests and decision making. Appropriate medical decision-making and tests are ordered as detailed in the plan of care above.  Coding/billing information is submitted for this visit based on complexity of care and/or time spent for the visit.      Reason for visit:  tremors, pain, and migraines  PCP: Og Davison MD    Chief Complaint: Back Pain (2 weeks of back pain. Working from home for 2 weeks. ) and Insomnia    HPI:  Linda Minaya is a 44 year old right-handed woman w/ a pmhx of thyroid nodules, hypertension, Hx left-sided action tremor/lip tremor, left L3 neuroforaminal  stenosis, chronic low back pain, migraine without aura, and left-sided carpal tunnel syndrome evaluated via video visit visit for left sided tremors, low back pain, chronic insomnia, and migraine headaches.    At her April 2024 visit started Topamax for migraine (first line), tremors (third line) and neuropathic pain (third line).  Add nurtec for rescue. Stop gabapentin (for tremors and neuropathic pain) and switch to lyrica (for tremors and neuropathic pain). Cont elavil. Offered doxazosin, which she declined.      04/28/25 Interval history/subjective  Here in follow up  Her back pain is worse.  She is going to see Dr. Behar on 5/6/25.  She has a Mild disc degeneration at the L3-L4 level with broad-based left foraminal/far lateral protrusion.   She is not falling.  Her legs are \"Somewhat weaker.\"  She has back pain radiating down her legs.  She is not driving.  She has to work from home b/c her pain is so bad.  Her pain is severe.        01/16/25 Interval history/subjective  Here  in follow up for insomnia, neck pain, low back pain, headaches,  paraesthesias, and  tremors.    Her headaches, insomnia, and tremors are stable.  She has new neck pain,  Woke up one morning 3 mo ago and had a crick in her neck.  She has decreased rom  She has pain and weakness in her neck and shoulders.  She is not dropping  objects.  Her hands feels really weak.  She had a xray of her c spine by her rheumatologist  Dr. Monson   She is interested in PT.    She has new low back pain that radiates down the posterior aspect of both legs.  Cannot sleep on her back, must sleep on her side. Has difficulty and pain getting out of a car now.    Lyrica helps w/ the low  back pain.    She would like to increase the dose of her lyrica.    04/02/24  Interval history/subjective:  Here  in follow up for insomnia, paraesthesias, and  tremors.  Tremors mainly left hand and left side of her mouth.  She saw her rheumatologist who also recommended shed f/u  w/ neurology for her pain;  When he hand shakes she described it as \"pill rolling\"    When she holds up her hand her little finger separates and shakes.    She has sx of neuropathy  Her hands and feet are tingling.    Reports the left side of her body is weak.    Describes her muscle pain as burning.    Has seen Dr. Behar in the past, will to try trigger point injections for pain.        02/11/22 Interval history/subjective:  LCV was 8/25/2021; had a phone follow-up for amitriptyline for insomnia    Since then she saw Dr. Behar (physical medicine rehab) on 9/1/2021: She had left-sided radicular pain and weakness in her left EHL.  She was started on Flexeril and diclofenac, referred to PT, and referred to bariatrics for weight loss.    She then saw Dr. Camacho (orthopedic surgery) in January 2022 after she fell twice on the ice and developed left knee pain.  Recommended HEP.    Ms. Minaya conducted the video visit at her place of employment. She was in an open office area.  She was seated in her cubicle.  Her coworker was in the cubicle next to her. There was also at least one other person in the room with her.  I asked Ms. Minaya if she was comfortable discussing all her medical history with other people in the room who were not her family members.  She states she had no issues.  I advised her that her personal medical history would be known to everyone who is able to hear our conversation.  I asked her if she would like to conduct her visit in a more private location.  She said that she had no objections and was \"okay with doing the video visit right here.  I do not care if they can hear.\"    Reports having tremors in her left hand and lip x 1 yr; initially was just left thumb and her upper lip. 6 mo ago involved her entire left hand.  \"sometimes,\" occurs simultaneously other times can be just the lip or just the left hand. Now involves her whole hand.  Handwriting is shakier not smaller  She stops what she is  doing when it happens b/c of the tremor  \" Tremor is scary \"  No anosmia  No hypophonia; feels like she gets hoarse/raspy.    Her coworker states that Ms. Minaya's tremor appears like \"someone who has the nervous shakes.\"  Stated that she can see it when she looks over from her cubicle at Ms. Minaya.    Reports she has difficulty swallowing  Reports globus sensation  Reports sometimes \"it comes back up.\" regurgitates food 3-4x a month.    Far vision is worse; it is blurrier; ongoing for 1 yr; last saw optometrist ([facility]) 1 yr ago notices it while driving,   Does not affect her ability to drive  Reports transient flashes in her vision.  Random patterns;     She reports that she fell 3 weeks ago; just went down. Fell forwards.  Unclear if he is referring to her recent fall in January (which was attributed to walking on the ice).    The Migraine Disability Assessment Test (MIDAS)    Note: Select zero if  they did not have the activity in the last 3 months.     On how many days in the last 3 months did you miss work or school because of your headaches?   0 days.    How many days in the last 3 months was your productivity at work or school reduced by half or more because of your headaches? (Do not include days you counted in question 1 where you missed work or school.)  5 days.     On how many days in the last 3 months did you not do household work (such as housework, home repairs and maintenance, shopping, caring for children and relatives) because of your headaches?  5 days.     How many days in the last 3 months was your productivity in household work reduced by half of more because of your headaches? (Do not include days you counted in question 3 where you did not do household work.)  2 days.     On how many days in the last 3 months did you miss family, social or leisure activities because of your headaches?  0 days.      Total (Questions 1-5): 12 d             A.On how many days in the last 3 months did  you have a headache? (If a headache lasted more than 1  day, count each day.)  36 headache days in a 3 mo period     B.On a scale of 0 - 10, on average how painful were these headaches? (where 0=no pain at all, and 10= pain as bad as it can be.)  Reports 10/10 pain.     Scoring:  After you have filled out this questionnaire, add the total number of days from questions 1-5 (ignore A and B).      MIDAS Grade  Definition  MIDAS Score       I        Little or No Disability     0-5          II     Mild Disability     6-10          III     Moderate Disability     11-20          IV     Severe Disability     21+          © Rep, 1997 © 2007, [employer]. All Rights reserved.     Prior failed migraine medications:  1. Sumatriptan 50 mg    Reports she cannot hear as well in her left ear; happens spontaneously and resolves spontaneously.     Not taking excedrin migraine >15x in a 30 day period. Also takes Tylenol; takes it 1-2x a mo w/ her cycle.    Wakes up feeling well rested. Does not wake up with a headache. Does not wake up in the middle night w// a HA. Does not wake up w/ cognitive sx.    [place] Sleepiness Scale: 0    08/25/21 interval history:  We discussed amitriptyline can increase the risk of cardiac arrhythmias.  We discussed all the adverse effects of amitriptyline.  Explained that amitriptyline and nortriptyline can be used to treat neuropathic pain, and can cause sedation, but the main goal of these medications is not to use them to treat insomnia.  Ms. Minaya reports that her insomnia is secondary to her low back pain which is a follow-up visit with Dr. Behar.  She also has a follow-up visit scheduled with this author in September.  Discussed doses of amitriptyline.    provided refill.  Answered all questions.        Review and summation of prior records  12/4/2018 neurology consult note from [facility]: She presented with low back pain that of been ongoing for a year, and  radiated down both of her legs.  She had a trial of physical therapy at the time but did not have any significant improvement.  On exam she had distal lower extremity pain that limited her ability to participate as well as weakness.  Her MRI demonstrated a disc bulge at L3 with some foraminal encroachment.  The patient's nortriptyline was uptitrated and she was given a Medrol Dosepak for acute management.  She was referred to PMR.  6/12/2019 she saw Dr. Behar for her knee pain.  10/16/2019 neurology clinic follow-up: Patient reported headaches with migrainous features, left hemibody sensory symptoms and a a lip tremor.  On exam she had decreased sensation to pinprick in the left side of her face, flattening of the left nasolabial fold, mild dysarthria, 4+ / 5 strength in her deltoids, 4/5 strength in her left hip flexors pain when testing deltoids, and a mild action tremor bilaterally, worse on the left.  Her sensation was decreased to pinprick in the left arm and leg.  She was noted to be hyperreflexic at her biceps bilaterally.  She had 1-2 beats of ankle clonus bilaterally as well as a positive Yosvany sign bilaterally.  Her toes were downgoing.  There is concerned she may have a central process, specifically demyelinating disease, given no clear peripheral localization, an MRI of the brain was ordered.  As well as an MRI of the cervical spine.  Per Dr. Gutierrez's personal read of the MRI of the  L-spine she had lumbar spinal stenosis.  1/7/2020 office visit: Intermittent tremor still persisted, at times making it difficult for her screw off the lid.  Reported dropping things.  Also had difficulty with fine motor tasks.  Reported lateral bulge and at times while walking.  Reported having photophobia secondary to her migraines.  An EMG was scheduled for her carpal tunnel syndrome.      ROS:  Pertinent positive and negatives per HPI.  All others were reviewed and negative.      Past Medical History:    Anesthesia  complication    increase heart rate after anesthesia    Arrhythmia    TACHYCARDIA    Asthma (HCC)    Back problem    Colon polyp    repeat CLN in     Constipation    Disorder of thyroid    thyroid nodules    Esophageal reflux    Essential hypertension    Helicobacter positive gastritis    on abx    High blood pressure    Migraines    Osteoarthritis    RA    Palpitations    Visual impairment    GLASSES         Current Outpatient Medications:   Current Outpatient Medications   Medication Instructions    albuterol 108 (90 Base) MCG/ACT Inhalation Aero Soln 2 puffs, Inhalation, Every 4 hours PRN    Amitriptyline HCl (ELAVIL) 150 mg, Oral, Nightly    Cholecalciferol (VITAMIN D) 1000 units Oral Tab Vitamin D    folic acid (FOLVITE) 1 mg, Oral, Daily    methotrexate (RHEUMATREX) 20 mg, Oral, Weekly    metoprolol succinate ER (TOPROL XL) 25 mg, Oral, Daily    Nurtec 75 mg, Oral, As needed, Take one tablet at onset of migraine.  Maximum dose in 24 hours is 1 tablet (75mg).    polyethylene glycol, PEG 3350-KCl-NaBcb-NaCl-NaSulf, 236 g Oral Recon Soln 4,000 mL, Oral, As Directed, May substitute prescription with Golytely/Nulytely/Gavilyte and or generic equivalent, if needed. Take bowel preparation as provided by Gastroenterology office, or visit our website at [email or web address]    pregabalin (LYRICA) 150 mg, Oral, 2 times daily    topiramate (TOPAMAX) 50 mg, Oral, 2 times daily       No Known Allergies    Past Surgical History:   Procedure Laterality Date    Appendectomy      may 2024    Arthroscopy of joint unlisted Left     knee x 2          5 yrs ago    Colonoscopy N/A 2024    Procedure: COLONOSCOPY;  Surgeon: NATALI Angeles MD;  Location: [facility] ENDOSCOPY    Tubal ligation             Family History   Problem Relation Age of Onset    Hypertension Father     Diabetes Mother     Other (hepatitis) Mother         hepatitis C    No Known Problems Son      Colon Polyps Maternal Grandmother     Diabetes Maternal Grandmother     Heart Disorder Maternal Grandmother 40        atrial fibrillation, heart problems in cousins, some who smoke    Other (hepatitis C) Maternal Grandmother     Breast Cancer Maternal Aunt 40    Cancer Other 40        breast cancer    Other (breast cancer) Other         40's    Other (colon cancer) Other         40's       Social history:  History   Smoking Status    Never   Smokeless Tobacco    Never     History   Alcohol Use No     Comment: RARE     History   Drug Use No       Family History   Problem Relation Age of Onset    Hypertension Father     Diabetes Mother     Other (hepatitis) Mother         hepatitis C    No Known Problems Son     Colon Polyps Maternal Grandmother     Diabetes Maternal Grandmother     Heart Disorder Maternal Grandmother 40        atrial fibrillation, heart problems in cousins, some who smoke    Other (hepatitis C) Maternal Grandmother     Breast Cancer Maternal Aunt 40    Cancer Other 40        breast cancer    Other (breast cancer) Other         40's    Other (colon cancer) Other         40's       Objective:    Last vitals and weight :  There were no vitals filed for this visit.      Exam:  - General: appears stated age and no distress  - Pulmonary: no signs of respiratory distress. Normal excursion of the chest.    Neurologic Exam  - Mental Status: Alert and attentive.  Pleasant and cooperative.  Fund of knowledge are excellent..  Speech is spontaneous, fluent, and prosodic. Comprehension and repetition intact. Phrase length and rate are normal.  - Cranial Nerves: No gaze preference. Visual fields: Able to see the examiner's entire face.  Pupils appear to be symmetric.  They appear to be equally round and reactive when the patient opens and closes eyes.  EOMI. No nystagmus. No ptosis. With self palpation she reports all 3 branches of trigeminal are intact bilaterally to light touch.No pathological facial  asymmetry. No flattening of the nasolabial fold. .  No tremor in her upper lip was appreciated.  She is able to elevate and depress her brow without any sign of weakness.  She can bury her eyelids when she closes them tightly.  She can puff her cheeks symmetrically.  Hearing grossly intact.  Tongue midline.Palate and uvula elevate symmetrically.  Shoulder shrug symmetric.  No head tilt.       - Motor:  normal tone/bulk.  Walking. Moving all 4 extremities symmetrically.          Her finger taps are equal and symmetric.  There is no decrementation or fatiguing.  Rapid movements are symmetric.    Pronator drift: No pronator drift    Asterixis: No asterixis noted.     - Sensory:    Light touch: normal  - Cerebellum: No truncal ataxia. No titubations. No dysmetria, no dysdiadochokinesis.          Data reviewed    Most recent lab results:       Test results/Imaging:     Lab Results   Component Value Date    TSH 0.673 03/11/2024     Lab Results   Component Value Date    HDL 48 10/28/2023     (H) 10/28/2023    TRIG 82 10/28/2023     Lab Results   Component Value Date    HGB 11.7 (L) 01/12/2025    HCT 36.8 01/12/2025    MCV 68.8 (L) 01/12/2025    WBC 10.7 01/12/2025    .0 (H) 01/12/2025      Lab Results   Component Value Date    BUN 9 01/12/2025    CA 9.7 01/12/2025    ALT 15 01/12/2025    AST 13 01/12/2025    ALB 4.8 01/12/2025     01/12/2025    K 3.4 (L) 01/12/2025     01/12/2025    CO2 27.0 01/12/2025      I have reviewed labs.    Performed an independent visualization of MRI of the lumbar spine, MRI of the brain, MRI of the C-spine  Imaging revealed:   MRI of the lumbar spine demonstrates lateral neural foraminal stenosis/recess narrowing at L3.  There is straightening of the normal lumbar lordosis.  Otherwise there is no central canal stenosis.  No significant disc bulges.  MRI of the brain is unremarkable for any signs of demyelinating disease.  MRI of the C-spine is unremarkable.           Linda Minaya is a very pleasant  44 year old right-handed woman w/ a pmhx of thyroid nodules, hypertension, L3 neuroforaminal stenosis, chronic low back pain, migraine, left-sided action tremors, transient left hemiparesis/hemisensory loss, and left-sided carpal tunnel syndrome evaluated for return of her left-sided tremors (now worse w/ pill rolling), neck pain, muscle pain, chronic low back pain, migraines, and chronic insomnia.  Her grandmother  and mother have Parkinson disease.     Patient reports that her handwriting is now shakier, her right hand.  She did not specifically mention any right-sided tremors, and none were appreciated on exam.  On exam she had a postural myoclonic tremor left hand.  No rigidity. She pérez not have sx of PD. Remainder of exam is nonfocal.    Etiology of her tremor is unclear.  one possibility she may have a myoclonic or dystonic tremor.  Treatment for dystonic tremor is the same as essential tremor.  Would avoid propranolol given her use of inhaler and ?possible asthma.  Second line medication is gabapentin, which he said has not helped her back pain in the past.  However she is never been on the prescribed dose (300 mg 3 times daily).  Hopefully this can help her tremor and improve her chronic low back pain.      Her migraines remain disabling.  She is having at least 12 headache days a month.  Sumatriptan did not provide relief, and she has been using Excedrin Migraine  as rescue.will switch her to Nurtec for rescue.  She is not on a prophylactic agent.  Consider using Topamax, but it may not be as effective for neuropathic pain.      Her low back pain is  MORE bothersome than her migraine pain, therefore we will start her on gabapentin.  Stressed importance of lifestyle changes to decrease her migraine frequency including maintaining hydration (specifically advised her to drink 88 ounces of water a day and to keep track of how much water she drinks), regular sleep schedule,  and not skipping meals.  We will continue amitriptyline for insomnia.  Screen for RAYRAY, her [place] Sleepiness Scale score was 0.  In the future will refer to sleep psychology.  She is aware of the adverse effects of amitriptyline.    Discussed the risk of  serotonin syndrome with amitriptyline and tramadol.   Will give her a short course of tramadol until she see Dr. Behar.  Will also write a rx for prednisone. She is aware of the red flags that would warrant her going to the ED.          1. Bilateral sciatica  - traMADol 50 MG Oral Tab; Take 1 tablet (50 mg total) by mouth nightly as needed for Pain.  Dispense: 7 tablet; Refill: 0  - predniSONE 20 MG Oral Tab; Take 3 tablets (60 mg total) by mouth daily for 4 days, THEN 2 tablets (40 mg total) daily for 4 days, THEN 1 tablet (20 mg total) daily for 4 days.  Dispense: 24 tablet; Refill: 0    2. Migraine without aura and without status migrainosus, not intractable  - Amitriptyline HCl 150 MG Oral Tab; Take 1 tablet (150 mg total) by mouth nightly.  Dispense: 90 tablet; Refill: 3    3. Neuropathic pain  - Amitriptyline HCl 150 MG Oral Tab; Take 1 tablet (150 mg total) by mouth nightly.  Dispense: 90 tablet; Refill: 3    4. Insomnia, unspecified type  - Amitriptyline HCl 150 MG Oral Tab; Take 1 tablet (150 mg total) by mouth nightly.  Dispense: 90 tablet; Refill: 3    5. Neck pain                  Education/Instructions given to: patient   Barriers to Learning: none  Content: Refer to note above.  Evaluation/Outcome: Verbalized understanding    This document is not intended to support charting by exception.  Sections left blank in a completed note should be presumed not to have been done.        Education and counseling provided to patient. Instructed patient to call my office or seek medical attention immediately if symptoms worsen.  Patient verbalized understanding of information given. All questions were answered. All side effects of drugs were discussed.                    Dougie Lam, DO  Vascular and General Neurology   04/28/25

## 2025-05-02 ENCOUNTER — APPOINTMENT (OUTPATIENT)
Dept: ULTRASOUND IMAGING | Age: 45
End: 2025-05-02
Attending: OBSTETRICS & GYNECOLOGY

## 2025-05-06 ENCOUNTER — TELEPHONE (OUTPATIENT)
Dept: PHYSICAL MEDICINE AND REHAB | Facility: CLINIC | Age: 45
End: 2025-05-06

## 2025-05-11 DIAGNOSIS — M05.79 RHEUMATOID ARTHRITIS INVOLVING MULTIPLE SITES WITH POSITIVE RHEUMATOID FACTOR (HCC): ICD-10-CM

## 2025-05-11 DIAGNOSIS — M54.2 NECK PAIN: ICD-10-CM

## 2025-05-12 NOTE — TELEPHONE ENCOUNTER
Requested Prescriptions     Pending Prescriptions Disp Refills    METHOTREXATE 2.5 MG Oral Tab [Pharmacy Med Name: Methotrexate Sodium 2.5 Mg Tab Teva] 104 tablet 0     Sig: Take 8 tablets (20 mg total) by mouth once a week.     LOV: 10/30/24  Future Appointments   Date Time Provider Department Center   5/14/2025  1:20 PM TONIA, RUPA [id number] None   5/20/2025  1:30 PM Dennis Escalera MD EMG NEURSURG [place] [facility]   5/23/2025 10:40 AM [place] Davison [place] [id number] [place]   6/7/2025 10:40 AM [facility] EM [facility]     Labs:   Component      Latest Ref Rng 1/12/2025   WBC      4.0 - 11.0 x10(3) uL 10.7    RBC      3.80 - 5.30 x10(6)uL 5.35 (H)    Hemoglobin      12.0 - 16.0 g/dL 11.7 (L)    Hematocrit      35.0 - 48.0 % 36.8    MCV      80.0 - 100.0 fL 68.8 (L)    MCH      26.0 - 34.0 pg 21.9 (L)    MCHC      31.0 - 37.0 g/dL 31.8    RDW-SD      35.1 - 46.3 fL 37.9    RDW      11.0 - 15.0 % 15.6 (H)    Platelet Count      150.0 - 450.0 10(3)uL 542.0 (H)    Prelim Neutrophil Abs      1.50 - 7.70 x10 (3) uL 7.89 (H)    Neutrophils Absolute      1.50 - 7.70 x10(3) uL 7.89 (H)    Lymphocytes Absolute      1.00 - 4.00 x10(3) uL 1.97    Monocytes Absolute      0.10 - 1.00 x10(3) uL 0.58    Eosinophils Absolute      0.00 - 0.70 x10(3) uL 0.10    Basophils Absolute      0.00 - 0.20 x10(3) uL 0.07    Immature Granulocyte Absolute      0.00 - 1.00 x10(3) uL 0.04    Neutrophils %      % 74.1    Lymphocytes %      % 18.5    Monocytes %      % 5.4    Eosinophils %      % 0.9    Basophils %      % 0.7    Immature Granulocyte %      % 0.4    Glucose      70 - 99 mg/dL 95    Sodium      136 - 145 mmol/L 141    Potassium      3.5 - 5.1 mmol/L 3.4 (L)    Chloride      98 - 112 mmol/L 106    Carbon Dioxide, Total      21.0 - 32.0 mmol/L 27.0    ANION GAP      0 - 18 mmol/L 8    BUN      9 - 23 mg/dL 9    CREATININE      0.55 - 1.02 mg/dL 0.80    BUN/CREATININE RATIO      10.0 - 20.0  11.3    CALCIUM      8.7 - 10.4 mg/dL 9.7     CALCULATED OSMOLALITY      275 - 295 mOsm/kg 290    EGFR      >=60 mL/min/1.73m2 93    ALT (SGPT)      10 - 49 U/L 15    AST (SGOT)      <34 U/L 13    ALKALINE PHOSPHATASE      37 - 98 U/L 111 (H)    Total Bilirubin      0.3 - 1.2 mg/dL 0.3    PROTEIN, TOTAL      5.7 - 8.2 g/dL 8.3 (H)    Albumin      3.2 - 4.8 g/dL 4.8    Globulin      2.0 - 3.5 g/dL 3.5    A/G Ratio      1.0 - 2.0  1.4    Patient Fasting for CMP? Yes    C-REACTIVE PROTEIN      <1.00 mg/dL 2.50 (H)       Legend:  (H) High  (L) Low    Instructions    You were seen for rheumatoid arthritis  Had a worsening joint pain  Lets increase methotrexate to 8 pills weekly  Continue folic acid daily  I also gave you naproxen to take once or twice a day for your pain  We injected your left knee with cortisone     Get blood work today  X-ray of the neck  Follow-up in 3 to 4-month

## 2025-05-13 RX ORDER — METHOTREXATE 2.5 MG/1
20 TABLET ORAL WEEKLY
Qty: 104 TABLET | Refills: 0 | Status: SHIPPED | OUTPATIENT
Start: 2025-05-13

## 2025-05-14 ENCOUNTER — ANESTHESIA (OUTPATIENT)
Dept: ENDOSCOPY | Facility: HOSPITAL | Age: 45
End: 2025-05-14
Payer: COMMERCIAL

## 2025-05-14 ENCOUNTER — HOSPITAL ENCOUNTER (OUTPATIENT)
Facility: HOSPITAL | Age: 45
Setting detail: HOSPITAL OUTPATIENT SURGERY
Discharge: HOME | End: 2025-05-14
Attending: INTERNAL MEDICINE | Admitting: INTERNAL MEDICINE
Payer: COMMERCIAL

## 2025-05-14 ENCOUNTER — ANESTHESIA EVENT (OUTPATIENT)
Dept: ENDOSCOPY | Facility: HOSPITAL | Age: 45
End: 2025-05-14
Payer: COMMERCIAL

## 2025-05-14 VITALS
WEIGHT: 215 LBS | RESPIRATION RATE: 23 BRPM | HEART RATE: 111 BPM | TEMPERATURE: 98 F | HEIGHT: 61 IN | OXYGEN SATURATION: 98 % | BODY MASS INDEX: 40.59 KG/M2 | SYSTOLIC BLOOD PRESSURE: 169 MMHG | DIASTOLIC BLOOD PRESSURE: 84 MMHG

## 2025-05-14 DIAGNOSIS — Z12.11 COLON CANCER SCREENING: ICD-10-CM

## 2025-05-14 PROBLEM — K63.5 POLYP OF COLON: Status: ACTIVE | Noted: 2025-05-14

## 2025-05-14 LAB — B-HCG UR QL: NEGATIVE

## 2025-05-14 PROCEDURE — 45385 COLONOSCOPY W/LESION REMOVAL: CPT | Performed by: INTERNAL MEDICINE

## 2025-05-14 RX ORDER — NALOXONE HYDROCHLORIDE 0.4 MG/ML
0.08 INJECTION, SOLUTION INTRAMUSCULAR; INTRAVENOUS; SUBCUTANEOUS ONCE AS NEEDED
Status: DISCONTINUED | OUTPATIENT
Start: 2025-05-14 | End: 2025-05-14

## 2025-05-14 RX ORDER — SODIUM CHLORIDE, SODIUM LACTATE, POTASSIUM CHLORIDE, CALCIUM CHLORIDE 600; 310; 30; 20 MG/100ML; MG/100ML; MG/100ML; MG/100ML
INJECTION, SOLUTION INTRAVENOUS CONTINUOUS
Status: DISCONTINUED | OUTPATIENT
Start: 2025-05-14 | End: 2025-05-14

## 2025-05-14 RX ORDER — LIDOCAINE HYDROCHLORIDE 10 MG/ML
INJECTION, SOLUTION EPIDURAL; INFILTRATION; INTRACAUDAL; PERINEURAL AS NEEDED
Status: DISCONTINUED | OUTPATIENT
Start: 2025-05-14 | End: 2025-05-14 | Stop reason: SURG

## 2025-05-14 RX ADMIN — LIDOCAINE HYDROCHLORIDE 25 MG: 10 INJECTION, SOLUTION EPIDURAL; INFILTRATION; INTRACAUDAL; PERINEURAL at 12:23:00

## 2025-05-14 RX ADMIN — SODIUM CHLORIDE, SODIUM LACTATE, POTASSIUM CHLORIDE, CALCIUM CHLORIDE: 600; 310; 30; 20 INJECTION, SOLUTION INTRAVENOUS at 12:46:00

## 2025-05-14 NOTE — DISCHARGE INSTRUCTIONS
Home Care Instructions for Colonoscopy with Sedation    Diet:  - Resume your regular diet as tolerated unless otherwise instructed.  - Start with light meals to minimize bloating.  - Do not drink alcohol today.    Medication:  - If you have questions about resuming your normal medications, please contact your Primary Care Physician.    Activities:  - Take it easy today. Do not return to work today.  - Do not drive today.  - Do not operate any machinery today (including kitchen equipment).    Colonoscopy:  - You may notice some rectal \"spotting\" (a little blood on the toilet tissue) for a day or two after the exam. This is normal.  - If you experience any rectal bleeding (not spotting), persistent tenderness or sharp severe abdominal pains, oral temperature over 100 degrees Fahrenheit, light-headedness or dizziness, or any other problems, contact your doctor.    **If unable to reach your doctor, please go to the [facility] Emergency Room**    - Your referring physician will receive a full report of your examination.  - If you do not hear from your doctor's office within two weeks of your biopsy, please call them for your results.    You may be able to see your laboratory results in [employer] between 4 and 7 business days.  In some cases, your physician may not have viewed the results before they are released to [employer].  If you have questions regarding your results contact the physician who ordered the test/exam by phone or via [employer] by choosing \"Ask a Medical Question.\"

## 2025-05-14 NOTE — ANESTHESIA PREPROCEDURE EVALUATION
Anesthesia PreOp Note    HPI:     Linda Minaya is a 45 year old female who presents for preoperative consultation requested by: NATALI Angeles MD    Date of Surgery: 5/14/2025    Procedure(s):  COLONOSCOPY  Indication: Colon cancer screening    Relevant Problems   No relevant active problems       NPO:  Last Liquid Consumption Date: 05/14/25  Last Liquid Consumption Time: 0900 (few sips at 0900, prep finished at 0600)  Last Solid Consumption Date: 05/13/25  Last Solid Consumption Time: 0900  Last Liquid Consumption Date: 05/14/25          History Review:  Patient Active Problem List    Diagnosis Date Noted    Bilateral sciatica 01/16/2025    Neck pain 01/16/2025    Gastroesophageal reflux disease without esophagitis 11/12/2024    Hiatal hernia 11/12/2024    Tortuous colon 11/12/2024    Gallbladder polyp 05/21/2024    Carpal tunnel syndrome of left wrist 06/19/2023    UTI symptoms 02/09/2023    Menorrhagia 09/21/2022    Vitamin D deficiency 05/11/2022    Other specified anemias 05/11/2022    Metrorrhagia 05/11/2022    Pharyngoesophageal dysphagia 04/11/2022    Migraine without aura and without status migrainosus, not intractable 02/11/2022    Lumbar trigger point syndrome 09/01/2021    Myalgia 09/01/2021    Lumbar spondylosis 09/01/2021    Lumbar foraminal stenosis 09/01/2021    Bulge of lumbar disc without myelopathy 09/01/2021    DDD (degenerative disc disease), lumbosacral 09/01/2021    Class 2 severe obesity due to excess calories with serious comorbidity and body mass index (BMI) of 37.0 to 37.9 in adult (HCC) 09/01/2021    Encounter for female sterilization procedure 10/16/2020    Isolated proteinuria 05/21/2020    Low blood sugar 05/09/2020    Asthma ([facility]) 09/12/2019    Complicated migraine 09/12/2019    Spinal stenosis of lumbar region with neurogenic claudication 08/24/2019    Patellofemoral pain syndrome of left knee 06/12/2019    Patellar tendinitis of left knee 06/12/2019    Tenderness of left patella  06/12/2019    Tachycardia 06/08/2019    Left flank tenderness 09/29/2018    Uterine leiomyoma 08/30/2018    Lumbar disc herniation with radiculopathy 08/19/2018    Left thyroid nodule 08/19/2018       Past Medical History[1]    Past Surgical History[2]    Prescriptions Prior to Admission[3]  Current Medications and Prescriptions Ordered in Epic[4]    Allergies[5]    Family History[6]  Social Hx on file[7]    Available pre-op labs reviewed.  Lab Results   Component Value Date    URINEPREG Negative 05/14/2025             Vital Signs:  Body mass index is 40.62 kg/m².   height is 1.549 m (5' 1\") and weight is 97.5 kg (215 lb). Her blood pressure is 154/94 (abnormal) and her pulse is 115. Her respiration is 20 and oxygen saturation is 98%.   Vitals:    05/07/25 1109 05/14/25 1112 05/14/25 1113 05/14/25 1118   BP:  (!) 160/99 (!) 163/92 (!) 154/94   Pulse:  115     Resp:  20     SpO2:  98%     Weight: 97.5 kg (215 lb)      Height: 1.549 m (5' 1\")           Anesthesia Evaluation     Patient summary reviewed and Nursing notes reviewed    Airway   Mallampati: III  TM distance: >3 FB  Neck ROM: full  Dental - Dentition appears grossly intact     Pulmonary - normal exam   (+) asthma  Cardiovascular - normal exam  (+) hypertension    Rhythm: regular  ROS comment: ST on EKG, normal Echo 1 year ago.    Neuro/Psych    (+)  neuromuscular disease,        GI/Hepatic/Renal    (+) GERD, bowel prep    Endo/Other    Abdominal   (+) obese                 Anesthesia Plan:   ASA:  3  Plan:   General  Informed Consent Plan and Risks Discussed With:  Patient  Discussed plan with:  CRNA and attending      I have informed Linda Minaya and/or legal guardian or family member of the nature of the anesthetic plan, benefits, risks including possible dental damage if relevant, major complications, and any alternative forms of anesthetic management.   All of the patient's questions were answered to the best of my ability. The patient desires the  anesthetic management as planned.  Nicky Louie, CRNA  2025 11:58 AM  Present on Admission:  **None**           [1]   Past Medical History:   Anesthesia complication    increase heart rate after anesthesia    Arrhythmia    TACHYCARDIA    Asthma (HCC)    Back problem    Colon polyp    repeat CLN in     Constipation    Disorder of thyroid    thyroid nodules    Esophageal reflux    Essential hypertension    Helicobacter positive gastritis    on abx    High blood pressure    Migraines    Osteoarthritis    RA    Palpitations    Visual impairment    GLASSES   [2]   Past Surgical History:  Procedure Laterality Date    Arthroscopy of joint unlisted Left     knee x 2          5 yrs ago    Cholecystectomy      Colonoscopy N/A 2024    Procedure: COLONOSCOPY;  Surgeon: NATALI Angeles MD;  [facility]: [facility] ENDOSCOPY    Tubal ligation         [3]   Medications Prior to Admission   Medication Sig Dispense Refill Last Dose/Taking    Amitriptyline HCl 150 MG Oral Tab Take 1 tablet (150 mg total) by mouth nightly. 90 tablet 3 2025    pregabalin 150 MG Oral Cap Take 1 capsule (150 mg total) by mouth 2 (two) times daily. 180 capsule 1 2025    Rimegepant Sulfate (NURTEC) 75 MG Oral Tablet Dispersible Take 75 mg by mouth as needed. Take one tablet at onset of migraine.  Maximum dose in 24 hours is 1 tablet (75mg). 8 tablet 11 2025    topiramate 50 MG Oral Tab Take 1 tablet (50 mg total) by mouth 2 (two) times daily. 180 tablet 1 2025    folic acid 1 MG Oral Tab Take 1 tablet (1 mg total) by mouth daily. 90 tablet 1 2025    albuterol 108 (90 Base) MCG/ACT Inhalation Aero Soln Inhale 2 puffs into the lungs every 4 (four) hours as needed for Wheezing. 1 each 0 Taking As Needed    metoprolol succinate ER 25 MG Oral Tablet 24 Hr Take 1 tablet (25 mg total) by mouth daily. 90 tablet 0 2025    Cholecalciferol (VITAMIN D) 1000 units Oral Tab    2025    METHOTREXATE 2.5 MG Oral Tab Take  8 tablets (20 mg total) by mouth once a week. 104 tablet 0 2025    [] traMADol 50 MG Oral Tab Take 1 tablet (50 mg total) by mouth nightly as needed for Pain. 7 tablet 0     [] predniSONE 20 MG Oral Tab Take 3 tablets (60 mg total) by mouth daily for 4 days, THEN 2 tablets (40 mg total) daily for 4 days, THEN 1 tablet (20 mg total) daily for 4 days. 24 tablet 0     polyethylene glycol, PEG 3350-KCl-NaBcb-NaCl-NaSulf, 236 g Oral Recon Soln Take 4,000 mL by mouth As Directed. May substitute prescription with Golytely/Nulytely/Gavilyte and or generic equivalent, if needed. Take bowel preparation as provided by Gastroenterology office, or visit our website at [email or web address] 4000 mL 0    [4]   Current Facility-Administered Medications Ordered in Epic   Medication Dose Route Frequency Provider Last Rate Last Admin    lactated ringers infusion   Intravenous Continuous NATALI Angeles MD 20 mL/hr at 25 1127 New Bag at 25 1127     No current [facility]-ordered outpatient medications on file.   [5] No Known Allergies  [6]   Family History  Problem Relation Age of Onset    Hypertension Father     Diabetes Mother     Other (hepatitis) Mother         hepatitis C    No Known Problems Son     Colon Polyps Maternal Grandmother     Diabetes Maternal Grandmother     Heart Disorder Maternal Grandmother 40        atrial fibrillation, heart problems in cousins, some who smoke    Other (hepatitis C) Maternal Grandmother     Breast Cancer Maternal Aunt 40    Cancer Other 40        breast cancer    Other (breast cancer) Other         40's    Other (colon cancer) Other         40's   [7]   Social History  Socioeconomic History    Marital status:    Tobacco Use    Smoking status: Never    Smokeless tobacco: Never   Vaping Use    Vaping status: Never Used   Substance and Sexual Activity    Alcohol use: No     Comment: RARE    Drug use: No   Other Topics  Concern    Caffeine Concern Yes     Comment: Coffee: 1 cup daily    Exercise No

## 2025-05-14 NOTE — H&P
History & Physical Examination    Patient Name: Linda Minaya  MRN: [id number]  [facility]: [id number]  YOB: 1980    Diagnosis: screening for colon cancer    Prescriptions Prior to Admission[1]  Current Hospital Medications[2]    Allergies: Allergies[3]    Past Medical History[4]  Past Surgical History[5]  Family History[6]  Social History     Tobacco Use    Smoking status: Never    Smokeless tobacco: Never   Substance Use Topics    Alcohol use: No     Comment: RARE       SYSTEM Check if Review is Normal Check if Physical Exam is Normal If not normal, please explain:   HEENT [X ] [ X]    NECK  [X ] [ X]    HEART [X ] [ X]    LUNGS [X ] [ X]    ABDOMEN [X ] [ X]    EXTREMITIES [X ] [ X]    OTHER        I have discussed the risks and benefits and alternatives of the procedure with the patient/family.  They understand and agree to proceed with plan of care.   I have reviewed the History and Physical done within the last 30 days.  Any changes noted above.    SILVINO Angeles MD  [facility] - Gastroenterology  5/14/2025  12:20 PM                 [1]   Medications Prior to Admission   Medication Sig Dispense Refill Last Dose/Taking    Amitriptyline HCl 150 MG Oral Tab Take 1 tablet (150 mg total) by mouth nightly. 90 tablet 3 5/13/2025    pregabalin 150 MG Oral Cap Take 1 capsule (150 mg total) by mouth 2 (two) times daily. 180 capsule 1 5/7/2025    Rimegepant Sulfate (NURTEC) 75 MG Oral Tablet Dispersible Take 75 mg by mouth as needed. Take one tablet at onset of migraine.  Maximum dose in 24 hours is 1 tablet (75mg). 8 tablet 11 5/7/2025    topiramate 50 MG Oral Tab Take 1 tablet (50 mg total) by mouth 2 (two) times daily. 180 tablet 1 5/7/2025    folic acid 1 MG Oral Tab Take 1 tablet (1 mg total) by mouth daily. 90 tablet 1 5/7/2025    albuterol 108 (90 Base) MCG/ACT Inhalation Aero Soln Inhale 2 puffs into the lungs every 4 (four) hours as needed for Wheezing. 1 each 0 Taking As Needed    metoprolol  succinate ER 25 MG Oral Tablet 24 Hr Take 1 tablet (25 mg total) by mouth daily. 90 tablet 0 2025    Cholecalciferol (VITAMIN D) 1000 units Oral Tab    2025    METHOTREXATE 2.5 MG Oral Tab Take 8 tablets (20 mg total) by mouth once a week. 104 tablet 0 2025    [] traMADol 50 MG Oral Tab Take 1 tablet (50 mg total) by mouth nightly as needed for Pain. 7 tablet 0     [] predniSONE 20 MG Oral Tab Take 3 tablets (60 mg total) by mouth daily for 4 days, THEN 2 tablets (40 mg total) daily for 4 days, THEN 1 tablet (20 mg total) daily for 4 days. 24 tablet 0     polyethylene glycol, PEG 3350-KCl-NaBcb-NaCl-NaSulf, 236 g Oral Recon Soln Take 4,000 mL by mouth As Directed. May substitute prescription with Golytely/Nulytely/Gavilyte and or generic equivalent, if needed. Take bowel preparation as provided by Gastroenterology office, or visit our website at [email or web address] 4000 mL 0    [2]   Current Facility-Administered Medications   Medication Dose Route Frequency    lactated ringers infusion   Intravenous Continuous   [3] No Known Allergies  [4]   Past Medical History:   Anesthesia complication    increase heart rate after anesthesia    Arrhythmia    TACHYCARDIA    Asthma (HCC)    Back problem    Colon polyp    repeat CLN in     Constipation    Disorder of thyroid    thyroid nodules    Esophageal reflux    Essential hypertension    Helicobacter positive gastritis    on abx    High blood pressure    Migraines    Osteoarthritis    RA    Palpitations    Visual impairment    GLASSES   [5]   Past Surgical History:  Procedure Laterality Date    Arthroscopy of joint unlisted Left     knee x 2          5 yrs ago    Cholecystectomy      Colonoscopy N/A 2024    Procedure: COLONOSCOPY;  Surgeon: NATALI Angeles MD;  Location: [facility] ENDOSCOPY    Tubal ligation         [6]   Family History  Problem Relation Age of Onset     Hypertension Father     Diabetes Mother     Other (hepatitis) Mother         hepatitis C    No Known Problems Son     Colon Polyps Maternal Grandmother     Diabetes Maternal Grandmother     Heart Disorder Maternal Grandmother 40        atrial fibrillation, heart problems in cousins, some who smoke    Other (hepatitis C) Maternal Grandmother     Breast Cancer Maternal Aunt 40    Cancer Other 40        breast cancer    Other (breast cancer) Other         40's    Other (colon cancer) Other         40's

## 2025-05-14 NOTE — OPERATIVE REPORT
COLONOSCOPY REPORT    Linda Minaya     1980 Age 45 year old   PCP Og Davison MD Endoscopist Tip Angeles MD     Date of procedure: 25    Procedure: Colonoscopy w/cold snare polypectomy    Pre-operative diagnosis: Screening, hx of polyps, family hx of CRC    Post-operative diagnosis: Colon polyp, internal hemorrhoids    Medications: MAC    Withdrawal time: 11 minutes    Procedure:  Informed consent was obtained from the patient after the risks of the procedure were discussed, including but not limited to bleeding, perforation, aspiration, infection, or possibility of a missed lesion. After discussions of the risks/benefits and alternatives to this procedure, as well as the planned sedation, the patient was placed in the left lateral decubitus position and begun on continuous blood pressure pulse oximetry and EKG monitoring and this was maintained throughout the procedure. Once an adequate level of sedation was obtained a digital rectal exam was completed. Then the lubricated tip of the [id number] diagnostic video colonoscope was inserted and advanced without difficulty to the cecum using the CO2 insufflation technique. The cecum was identified by localizing the trifold, the appendix and the ileocecal valve. Withdrawal was begun with thorough washing and careful examination of the colonic walls and folds. A routine second examination of the cecum/ascending colon was performed. Photodocumentation was obtained. The bowel prep was fair to good. Views of the colon were good with washing. I then carefully withdrew the instrument from the patient who tolerated the procedure well.     Complications: none.    Findings:   1. One polyp(s) noted as follows:      A. 10 mm polyp in the ascending colon; on the IC valve, flat morphology; cold snare polypectomy and retrieved.    2. Diverticulosis: none.    3. Terminal ileum: the visualized mucosa appeared normal.    4. The colonic mucosa throughout  the colon showed normal vascular pattern, without evidence of angioectasias or inflammation.     5. A retroflexed view of the rectum revealed small internal hemorrhoids.    6. RODRIGUEZ: normal rectal tone, no masses palpated.     Impression:   One colon polyp removed (10mm) on the IC valve. This area was biopsied in 2024 and showed adenomatous change, thus today c-scope was performed to remove this area.  Small internal hemorrhoids.  Of note patient is persistently tachycardic to 120s, asked her to f/u with her cardiologist.     Recommend:  Await pathology. Repeat CLN in 3 years, family hx of CRC noted. If new signs or symptoms develop, colonoscopy may need to be repeated sooner.   High fiber diet.  Monitor for blood in the stool. If having more than just tinge of blood, call office or go to the ER.  Recommend enhanced bowel prep for next colonoscopy (3 days of dulcolax + bowel prep). Patient should be reminded NOT to consume seeds, nuts, corn, popcorn or raw fruits/vegetables for 5 days prior to next colonoscopy.     >>>If tissue was obtained and you have not received your pathology results either by phone or letter within 2 weeks, please call our office at [phone].    Specimens: colon  Blood loss: <1 ml      ----------------------------------------------------------------------------------------------------------------------------------    INTERVAL FOR COLONOSCOPY:   - If there is no family history of colon cancer and no colon polyps identified in an adequately prepped colon - colonoscopy should be repeated in 10 years. Various factors should be considered regarding repeat colonoscopy - including co-morbid conditions, ability to tolerate procedure with advanced age, and desire for repeat testing.   - If no colon polyps were identified and a positive family history of colon cancer - the colonoscopy should be repeated in 5 years.   - If colon polyps were removed, the colonoscopy should be repeated sooner depending  on size/type/location of polyp.

## 2025-05-14 NOTE — ANESTHESIA POSTPROCEDURE EVALUATION
Patient: Linda Minaya    Procedure Summary       Date: 05/14/25 Room / Location: [facility] ENDOSCOPY 03 / [facility] ENDOSCOPY    Anesthesia Start: 1219 Anesthesia Stop: 1250    Procedure: COLONOSCOPY Diagnosis:       Colon cancer screening      (polyps, hemorrhoids)    Surgeons: NATALI Angeles MD Anesthesiologist: Nicky Louie CRNA    Anesthesia Type: general ASA Status: 3            Anesthesia Type: general    Vitals Value Taken Time   /84 05/14/25 12:55   Temp 97.6 °F (36.4 °C) 05/14/25 12:46   Pulse 112 05/14/25 13:04   Resp 20 05/14/25 13:04   SpO2 99 % 05/14/25 13:04   Vitals shown include unfiled device data.    [facility] AN Post Evaluation:   Patient Evaluated in PACU  Patient Participation: complete - patient participated  Level of Consciousness: awake and awake and alert  Pain Score: 0  Pain Management: adequate  Airway Patency:patent  Dental exam unchanged from preop  Yes    Cardiovascular Status: acceptable  Respiratory Status: acceptable  Postoperative Hydration acceptable      Nicky Louie CRNA  5/14/2025 1:05 PM

## 2025-05-23 ENCOUNTER — OFFICE VISIT (OUTPATIENT)
Age: 45
End: 2025-05-23
Payer: COMMERCIAL

## 2025-05-23 VITALS
HEART RATE: 120 BPM | DIASTOLIC BLOOD PRESSURE: 88 MMHG | SYSTOLIC BLOOD PRESSURE: 148 MMHG | OXYGEN SATURATION: 97 % | BODY MASS INDEX: 40.4 KG/M2 | HEIGHT: 61 IN | WEIGHT: 214 LBS

## 2025-05-23 DIAGNOSIS — R73.9 HYPERGLYCEMIA: ICD-10-CM

## 2025-05-23 DIAGNOSIS — E87.6 HYPOKALEMIA: ICD-10-CM

## 2025-05-23 DIAGNOSIS — M19.90 INFLAMMATORY ARTHRITIS: ICD-10-CM

## 2025-05-23 DIAGNOSIS — M47.812 CERVICAL SPINE ARTHRITIS: ICD-10-CM

## 2025-05-23 DIAGNOSIS — I10 ESSENTIAL HYPERTENSION: Primary | ICD-10-CM

## 2025-05-23 DIAGNOSIS — H57.9 VISUAL COMPLAINT: ICD-10-CM

## 2025-05-23 PROCEDURE — 3077F SYST BP >= 140 MM HG: CPT | Performed by: INTERNAL MEDICINE

## 2025-05-23 PROCEDURE — 99214 OFFICE O/P EST MOD 30 MIN: CPT | Performed by: INTERNAL MEDICINE

## 2025-05-23 PROCEDURE — 3079F DIAST BP 80-89 MM HG: CPT | Performed by: INTERNAL MEDICINE

## 2025-05-23 PROCEDURE — 3008F BODY MASS INDEX DOCD: CPT | Performed by: INTERNAL MEDICINE

## 2025-05-23 RX ORDER — AMLODIPINE BESYLATE 5 MG/1
5 TABLET ORAL DAILY
Qty: 90 TABLET | Refills: 3 | Status: SHIPPED | OUTPATIENT
Start: 2025-05-23 | End: 2026-05-18

## 2025-05-23 NOTE — PROGRESS NOTES
The following individual(s) verbally consented to be recorded using ambient AI listening technology and understand that they can each withdraw their consent to this listening technology at any point by asking the clinician to turn off or pause the recording: YES    Patient name: Linda Minaya  Additional names:

## 2025-05-27 ENCOUNTER — OFFICE VISIT (OUTPATIENT)
Dept: SURGERY | Facility: CLINIC | Age: 45
End: 2025-05-27
Payer: COMMERCIAL

## 2025-05-27 VITALS
DIASTOLIC BLOOD PRESSURE: 109 MMHG | SYSTOLIC BLOOD PRESSURE: 175 MMHG | BODY MASS INDEX: 40.4 KG/M2 | HEIGHT: 61 IN | WEIGHT: 214 LBS | HEART RATE: 125 BPM

## 2025-05-27 DIAGNOSIS — M54.40 LUMBAGO OF LUMBAR REGION WITH SCIATICA: Primary | ICD-10-CM

## 2025-05-27 PROCEDURE — 3077F SYST BP >= 140 MM HG: CPT | Performed by: NEUROLOGICAL SURGERY

## 2025-05-27 PROCEDURE — 99204 OFFICE O/P NEW MOD 45 MIN: CPT | Performed by: NEUROLOGICAL SURGERY

## 2025-05-27 PROCEDURE — 3008F BODY MASS INDEX DOCD: CPT | Performed by: NEUROLOGICAL SURGERY

## 2025-05-27 PROCEDURE — 3080F DIAST BP >= 90 MM HG: CPT | Performed by: NEUROLOGICAL SURGERY

## 2025-05-27 NOTE — PROGRESS NOTES
New patient presents for back pain that has been present for several years. Patient states this is low back pain and locks up on her. The pain is bilateral. She admits to radiating pain in her legs bilaterally, occasionally N/T and weakness. She follows with Dr. Lam for migraine and other neurological symptoms.     Prev surgeries: none  Injections: none  PT: none  Meds: none  Pain scale: 9/10 daily

## 2025-05-27 NOTE — H&P
MITZI SHAH M.D., F.A.A.N.S.     of Neurosurgery  [facility]  Board Certified Neurosurgeon                              [facility]        [place] [facility]      [address]  [address]  [place] [address]    PHONE  [phone]          FAX  [phone]    [email or web address]    [facility] MEDICAL [facility], [facility], [place]     OFFICE CONSULTATION          Linda Minaya  : 1980   MRN: [id number]    PCP: Og Davison MD  Referring Provider: No ref. provider found     Insurance: Payor: [employer] [place] PPO / Plan: BLUE CROSS [facility] PPO / Product Type: PPO /            Date of Consult:  2025    Reason for Consultation:  Our patient has been referred to our office for evaluation of: Lower back pain      History of Present Illness:  Linda Minaya is a a(n) right-handed, 45 year old, female.  This is a pleasant lady with a history of lower back pain that starts in the lower lumbosacral area and can radiate into her bilateral thighs in her groin.  She denies any focal weakness and she denies any acute bowel or bladder changes.  She is attempted to treat this with over-the-counter treatment modalities but has not pursued any alternatives.        History:  Past Medical History[1]   Past Surgical History[2]  Family History[3]   Social History     Socioeconomic History    Marital status:      Spouse name: Not on file    Number of children: Not on file    Years of education: Not on file    Highest education level: Not on file   Occupational History    Not on file   Tobacco Use    Smoking status: Never    Smokeless tobacco: Never   Vaping Use    Vaping status: Never Used   Substance and Sexual Activity    Alcohol use: No     Comment: RARE    Drug use: No    Sexual activity: Not on file   Other Topics Concern    Caffeine Concern Yes     Comment: Coffee: 1 cup daily    Exercise No    Seat Belt Not Asked     Special Diet Not Asked    Stress Concern Not Asked    Weight Concern Not Asked     Service Not Asked    Blood Transfusions Not Asked    Occupational Exposure Not Asked    Hobby Hazards Not Asked    Sleep Concern Not Asked    Back Care Not Asked    Bike Helmet Not Asked    Self-Exams Not Asked   Social History Narrative    The patient does not use an assistive device..      The patient does not live in a home with stairs.     Social Drivers of Health     Food Insecurity: Low Risk  (10/9/2024)    Received from [employer]    Food Insecurity     Within the past 12 months, you worried that your food would run out before you got money to buy more.  : Never true     Within the past 12 months, the food you bought just didn't last and you didn't have money to get more. : Never true   Transportation Needs: Not At Risk (10/9/2024)    Received from [facility]    Transportation Needs     In the past 12 months, has lack of reliable transportation kept you from medical appointments, meetings, work or from getting things needed for daily living? : No   Stress: Not on file   Housing Stability: Not on file        Allergies:  Allergies[4]    Medications:  Current Medications[5]     Review of Systems:  A 10-point system was reviewed.  Pertinent positives and negatives are noted in HPI.      Physical Exam:  BP (!) 175/109 (BP Location: Right arm, Patient Position: Sitting, Cuff Size: adult)   Pulse (!) 125   Ht 61\"   Wt 214 lb (97.1 kg)   LMP 04/06/2025 (Within Days)   BMI 40.43 kg/m²        Neurological Exam:    Motor Strength:  5/5 AMG and symmetric she can get up on her toes and her heels but cannot perform a squat because of pain.    Sensation to light touch:  Intact and symmetric in lower extremities    DTRs:  2+/4 in lower extremities    Long tract signs:  Negative piña  Negative babinski  Negative clonus      Abdomen:  Soft, non-distended, non-tender, with no rebound or guarding.  No  peritoneal signs.     Extremities:  Non-tender, no lower extremity edema noted.      Labs:  CBC:  Lab Results   Component Value Date    WBC 10.7 01/12/2025    HGB 11.7 (L) 01/12/2025    HCT 36.8 01/12/2025    MCV 68.8 (L) 01/12/2025    .0 (H) 01/12/2025      BMG:   Lab Results   Component Value Date     01/12/2025    K 3.4 (L) 01/12/2025    CO2 27.0 01/12/2025     01/12/2025    BUN 9 01/12/2025      INR:   No results found for: \"INR\", \"PROTIME\"     Diagnostics:  I reviewed an MRI of the lumbar spine, dated 31 January, 2025.  There is mild degenerative disc disease in the setting of proper alignment of the lumbar vertebral bodies and maintenance of regional lumbar lordosis.  There is no significant stenosis.     Diagnosis:  1. Lumbago of lumbar region with sciatica  - XR LUMBAR SPINE AP LAT FLEX EXT (CPT=72110); Future  - PHYSIATRY - INTERNAL  - Physical Therapy Referral - External        Assessment/Plan:  Linda Minaya is a a(n) 45 year old, female, presents with what appears to be mechanical lower back pain in the absence of any significant radiographic findings of surgical nature.  She has not attempted any alternative treatment modalities and will be referred for physical therapy as well as physiatry.  We will order flexion-extension x-rays to rule out the possibility of instability but at this point in time she is not a surgical candidate.    All questions and concerns were addressed. We appreciate the opportunity to participate in the care of this patient. Please do not hesitate to call our office ([phone]) with any issues.   This report will be submitted to the referring provider.          Dennis Escalera M.D., F.A.A.N.S.    5/27/2025  10:24 AM    This note has been dictated utilizing voice recognition software. Unfortunately, this may lead to occasional typographical errors. If there are any questions regarding this, please do not hesitate to contact our office.        [1]   Past Medical  History:   Anesthesia complication    increase heart rate after anesthesia    Arrhythmia    TACHYCARDIA    Asthma (HCC)    Back problem    Colon polyp    repeat CLN in     Constipation    Disorder of thyroid    thyroid nodules    Esophageal reflux    Essential hypertension    Helicobacter positive gastritis    on abx    High blood pressure    Migraines    Osteoarthritis    RA    Palpitations    Visual impairment    GLASSES   [2]   Past Surgical History:  Procedure Laterality Date    Arthroscopy of joint unlisted Left     knee x 2          5 yrs ago    Cholecystectomy      Colonoscopy N/A 2024    Procedure: COLONOSCOPY;  Surgeon: NATALI Angeles MD;  Location: [facility] ENDOSCOPY    Colonoscopy N/A 2025    Procedure: COLONOSCOPY;  Surgeon: NATALI Angeles MD;  Location: [facility] ENDOSCOPY    Tubal ligation         [3]   Family History  Problem Relation Age of Onset    Hypertension Father     Diabetes Mother     Other (hepatitis) Mother         hepatitis C    No Known Problems Son     Colon Polyps Maternal Grandmother     Diabetes Maternal Grandmother     Heart Disorder Maternal Grandmother 40        atrial fibrillation, heart problems in cousins, some who smoke    Other (hepatitis C) Maternal Grandmother     Breast Cancer Maternal Aunt 40    Cancer Other 40        breast cancer    Other (breast cancer) Other         40's    Other (colon cancer) Other         40's   [4] No Known Allergies  [5]   Current Outpatient Medications   Medication Sig Dispense Refill    amLODIPine 5 MG Oral Tab Take 1 tablet (5 mg total) by mouth daily. 90 tablet 3    METHOTREXATE 2.5 MG Oral Tab Take 8 tablets (20 mg total) by mouth once a week. 104 tablet 0    Amitriptyline HCl 150 MG Oral Tab Take 1 tablet (150 mg total) by mouth nightly. 90 tablet 3    pregabalin 150 MG Oral Cap Take 1 capsule (150 mg total) by mouth 2 (two) times daily. 180 capsule 1    Rimegepant Sulfate (NURTEC) 75 MG Oral Tablet Dispersible Take 75  mg by mouth as needed. Take one tablet at onset of migraine.  Maximum dose in 24 hours is 1 tablet (75mg). 8 tablet 11    topiramate 50 MG Oral Tab Take 1 tablet (50 mg total) by mouth 2 (two) times daily. 180 tablet 1    folic acid 1 MG Oral Tab Take 1 tablet (1 mg total) by mouth daily. 90 tablet 1    albuterol 108 (90 Base) MCG/ACT Inhalation Aero Soln Inhale 2 puffs into the lungs every 4 (four) hours as needed for Wheezing. 1 each 0    metoprolol succinate ER 25 MG Oral Tablet 24 Hr Take 1 tablet (25 mg total) by mouth daily. 90 tablet 0    Cholecalciferol (VITAMIN D) 1000 units Oral Tab

## 2025-05-27 NOTE — PROGRESS NOTES
History of Present Illness  Linda Minaya is a 45 year old female with hypertension and rheumatoid arthritis who presents for a checkup.    She has consistently high blood pressure readings both at home and during medical visits. She is currently taking metoprolol and inconsistently takes amlodipine. Her blood pressure was noted to be high during a recent colonoscopy, and she has observed a trend of elevated readings over the past six months.    She has a history of rheumatoid arthritis and was previously on methotrexate, which she discontinued three months ago due to concerns about side effects. She experiences lumbar spine pain, particularly with activities requiring bending and lifting, and reports that her back 'locks up' at night, necessitating time in the morning to 'unlock' before getting up. She has not seen her rheumatologist recently.    She recently had MRI of her cervical and lumbar spine.  Ordered by Dr. Lam results to look bad showing degenerative changes    She reports visual disturbances, including difficulty focusing and needing to pull away from tasks to rest her eyes. She wears glasses and is due for an eye exam in June.    She is concerned about her blood sugar levels, noting a recent A1c of 6.3, which is in the prediabetic range. She acknowledges the need for weight loss to manage her prediabetes.    She has a history of low potassium levels and high blood pressure, raising concerns about potential adrenal gland issues. She has previously been tested for Cushing's syndrome, which was negative.    She recently underwent a second colonoscopy where a polyp was removed, and the pathology showed polypoid fragments with no adenomas.             Patient Active Problem List    Diagnosis Date Noted    Polyp of colon 05/14/2025    Bilateral sciatica 01/16/2025    Neck pain 01/16/2025    Gastroesophageal reflux disease without esophagitis 11/12/2024    Hiatal hernia 11/12/2024    Tortuous colon  11/12/2024    Gallbladder polyp 05/21/2024    Carpal tunnel syndrome of left wrist 06/19/2023    UTI symptoms 02/09/2023    Menorrhagia 09/21/2022    Vitamin D deficiency 05/11/2022    Other specified anemias 05/11/2022    Metrorrhagia 05/11/2022    Pharyngoesophageal dysphagia 04/11/2022    Migraine without aura and without status migrainosus, not intractable 02/11/2022    Lumbar trigger point syndrome 09/01/2021    Myalgia 09/01/2021    Lumbar spondylosis 09/01/2021    Lumbar foraminal stenosis 09/01/2021    Bulge of lumbar disc without myelopathy 09/01/2021    DDD (degenerative disc disease), lumbosacral 09/01/2021    Class 2 severe obesity due to excess calories with serious comorbidity and body mass index (BMI) of 37.0 to 37.9 in adult ([facility]) 09/01/2021    Encounter for female sterilization procedure 10/16/2020    Isolated proteinuria 05/21/2020    Low blood sugar 05/09/2020    Asthma ([facility]) 09/12/2019    Complicated migraine 09/12/2019    Spinal stenosis of lumbar region with neurogenic claudication 08/24/2019    Patellofemoral pain syndrome of left knee 06/12/2019    Patellar tendinitis of left knee 06/12/2019    Tenderness of left patella 06/12/2019    Tachycardia 06/08/2019    Left flank tenderness 09/29/2018    Uterine leiomyoma 08/30/2018    Lumbar disc herniation with radiculopathy 08/19/2018    Left thyroid nodule 08/19/2018        Medications - Current[1]     Vitals:    05/23/25 1014   BP: 148/88   Pulse: 120   VITALSBody mass index is 40.43 kg/m².      General: No distress normal affect normal cognition.    Neurological exam looks normal no obvious ocular abnormalities noted    Lungs: normal     Heart: Regular sounding     Has some rheumatoid arthritis changes          Linda was seen today for physical.    Diagnoses and all orders for this visit:    Essential hypertension  -     Renin, Plasma [E]; Future  -     Aldosterone, Serum [E]; Future    Visual complaint  -     OPHTHALMOLOGY - INTERNAL;  Future    Hypokalemia  -     Renin, Plasma [E]; Future  -     Aldosterone, Serum [E]; Future    Hyperglycemia    Other orders  -     amLODIPine 5 MG Oral Tab; Take 1 tablet (5 mg total) by mouth daily.       Assessment & Plan  Essential hypertension  Chronic hypertension with uncontrolled blood pressure despite metoprolol and inconsistent amlodipine use. Potential adrenal dysfunction due to hypokalemia.  - Prescribe amlodipine with pill box for adherence.  - Follow-up in two weeks to reassess blood pressure.  - Order plasma renin and aldosterone levels.  - Bring home blood pressure readings to next appointment.    Hypokalemia  Chronic hypokalemia possibly linked to adrenal dysfunction, requiring evaluation for primary aldosteronism.  - Order plasma renin and aldosterone levels.    Cervical spine arthritis  MRI shows herniation without surgical indication. Symptoms managed non-surgically.  - Follow up with neurosurgeon.  - Encourage physical therapy and core strengthening.    Inflammatory arthritis  Discontinued methotrexate due to side effects. Needs alternative management to prevent joint damage.  - Consider restarting methotrexate or consult rheumatologist for alternatives.  - Schedule appointment with rheumatologist.    Visual disturbance  Progressive vision issues over six months, requires ophthalmological evaluation.  - Refer to ophthalmologist Dr. Rea.    Prediabetes  A1c of 6.3 indicates prediabetes. Lifestyle changes needed to prevent diabetes.  - Encourage weight loss and lifestyle modifications.             This note was prepared using Dragon Medical voice recognition dictation software and as a result, errors may occur. When identified, these errors have been corrected. While every attempt is made to correct errors during dictation, discrepancies may still exist          [1]   Current Outpatient Medications:     amLODIPine 5 MG Oral Tab, Take 1 tablet (5 mg total) by mouth daily., Disp: 90 tablet, Rfl:  3    METHOTREXATE 2.5 MG Oral Tab, Take 8 tablets (20 mg total) by mouth once a week., Disp: 104 tablet, Rfl: 0    Amitriptyline HCl 150 MG Oral Tab, Take 1 tablet (150 mg total) by mouth nightly., Disp: 90 tablet, Rfl: 3    pregabalin 150 MG Oral Cap, Take 1 capsule (150 mg total) by mouth 2 (two) times daily., Disp: 180 capsule, Rfl: 1    Rimegepant Sulfate (NURTEC) 75 MG Oral Tablet Dispersible, Take 75 mg by mouth as needed. Take one tablet at onset of migraine.  Maximum dose in 24 hours is 1 tablet (75mg)., Disp: 8 tablet, Rfl: 11    topiramate 50 MG Oral Tab, Take 1 tablet (50 mg total) by mouth 2 (two) times daily., Disp: 180 tablet, Rfl: 1    folic acid 1 MG Oral Tab, Take 1 tablet (1 mg total) by mouth daily., Disp: 90 tablet, Rfl: 1    albuterol 108 (90 Base) MCG/ACT Inhalation Aero Soln, Inhale 2 puffs into the lungs every 4 (four) hours as needed for Wheezing., Disp: 1 each, Rfl: 0    metoprolol succinate ER 25 MG Oral Tablet 24 Hr, Take 1 tablet (25 mg total) by mouth daily., Disp: 90 tablet, Rfl: 0    Cholecalciferol (VITAMIN D) 1000 units Oral Tab, , Disp: , Rfl:

## 2025-06-05 ENCOUNTER — TELEPHONE (OUTPATIENT)
Dept: GASTROENTEROLOGY | Facility: CLINIC | Age: 45
End: 2025-06-05

## 2025-06-05 NOTE — TELEPHONE ENCOUNTER
I mailed out colonoscopy results letter to patient.  Updated health maintenance  Entered into 3 yr CLN recall  Colonoscopy done 5/14/2025      NATALI Angeles MD  [facility] Clinical Staff  GI staff: please place recall for colonoscopy in 3 years

## 2025-06-07 ENCOUNTER — HOSPITAL ENCOUNTER (OUTPATIENT)
Dept: MAMMOGRAPHY | Facility: HOSPITAL | Age: 45
Discharge: HOME | End: 2025-06-07
Attending: OBSTETRICS & GYNECOLOGY
Payer: COMMERCIAL

## 2025-06-07 DIAGNOSIS — Z12.31 SCREENING MAMMOGRAM FOR BREAST CANCER: ICD-10-CM

## 2025-06-07 LAB — HM MAMMOGRAPHY BILATERAL: NORMAL

## 2025-06-07 PROCEDURE — 77067 SCR MAMMO BI INCL CAD: CPT | Performed by: OBSTETRICS & GYNECOLOGY

## 2025-06-07 PROCEDURE — 77063 BREAST TOMOSYNTHESIS BI: CPT | Performed by: OBSTETRICS & GYNECOLOGY

## 2025-06-10 ENCOUNTER — CLINICAL DOCUMENTATION (OUTPATIENT)
Dept: OBGYN | Age: 45
End: 2025-06-10

## 2025-06-26 ENCOUNTER — APPOINTMENT (OUTPATIENT)
Dept: OBGYN | Age: 45
End: 2025-06-26

## 2025-06-26 VITALS — SYSTOLIC BLOOD PRESSURE: 142 MMHG | HEART RATE: 74 BPM | DIASTOLIC BLOOD PRESSURE: 80 MMHG

## 2025-06-26 DIAGNOSIS — Z30.42 SURVEILLANCE FOR DEPO-PROVERA CONTRACEPTION: Primary | ICD-10-CM

## 2025-06-26 PROCEDURE — 96372 THER/PROPH/DIAG INJ SC/IM: CPT | Performed by: OBSTETRICS & GYNECOLOGY

## 2025-06-26 RX ORDER — MEDROXYPROGESTERONE ACETATE 150 MG/ML
150 INJECTION, SUSPENSION INTRAMUSCULAR ONCE
Status: COMPLETED | OUTPATIENT
Start: 2025-06-26 | End: 2025-06-26

## 2025-06-26 RX ADMIN — MEDROXYPROGESTERONE ACETATE 150 MG: 150 INJECTION, SUSPENSION INTRAMUSCULAR at 10:13

## 2025-07-06 ENCOUNTER — HOSPITAL ENCOUNTER (OUTPATIENT)
Age: 45
Discharge: HOME | End: 2025-07-06
Payer: COMMERCIAL

## 2025-07-06 ENCOUNTER — APPOINTMENT (OUTPATIENT)
Dept: GENERAL RADIOLOGY | Age: 45
End: 2025-07-06
Payer: COMMERCIAL

## 2025-07-06 ENCOUNTER — APPOINTMENT (OUTPATIENT)
Dept: ULTRASOUND IMAGING | Facility: HOSPITAL | Age: 45
End: 2025-07-06
Payer: COMMERCIAL

## 2025-07-06 VITALS
TEMPERATURE: 99 F | DIASTOLIC BLOOD PRESSURE: 98 MMHG | RESPIRATION RATE: 18 BRPM | SYSTOLIC BLOOD PRESSURE: 158 MMHG | HEART RATE: 116 BPM | OXYGEN SATURATION: 100 %

## 2025-07-06 DIAGNOSIS — M79.672 LEFT FOOT PAIN: Primary | ICD-10-CM

## 2025-07-06 DIAGNOSIS — M25.572 ACUTE LEFT ANKLE PAIN: ICD-10-CM

## 2025-07-06 DIAGNOSIS — M11.20 PSEUDOGOUT: ICD-10-CM

## 2025-07-06 DIAGNOSIS — M79.89 LEFT LEG SWELLING: ICD-10-CM

## 2025-07-06 PROBLEM — E04.2 MULTINODULAR GOITER (NONTOXIC): Status: ACTIVE | Noted: 2024-04-12

## 2025-07-06 PROBLEM — D50.9 IRON DEFICIENCY ANEMIA: Status: ACTIVE | Noted: 2022-11-20

## 2025-07-06 PROBLEM — I47.10 SUPRAVENTRICULAR TACHYCARDIA (HCC): Status: ACTIVE | Noted: 2024-05-14

## 2025-07-06 PROBLEM — M06.9 ARTHRITIS, RHEUMATOID (HCC): Status: ACTIVE | Noted: 2024-03-01

## 2025-07-06 PROBLEM — I10 HYPERTENSION: Status: ACTIVE | Noted: 2024-05-14

## 2025-07-06 LAB
#MXD IC: 0.7 X10ˆ3/UL (ref 0.1–1)
BUN BLD-MCNC: 8 MG/DL (ref 7–18)
CHLORIDE BLD-SCNC: 103 MMOL/L (ref 98–112)
CO2 BLD-SCNC: 24 MMOL/L (ref 21–32)
CREAT BLD-MCNC: 0.8 MG/DL (ref 0.55–1.02)
EGFRCR SERPLBLD CKD-EPI 2021: 92 ML/MIN/1.73M2 (ref 60–?)
GLUCOSE BLD-MCNC: 76 MG/DL (ref 70–99)
HCT VFR BLD AUTO: 37.6 % (ref 35–48)
HCT VFR BLD CALC: 42 % (ref 34–50)
HGB BLD-MCNC: 12 G/DL (ref 12–16)
ISTAT IONIZED CALCIUM FOR CHEM 8: 1.25 MMOL/L (ref 1.12–1.32)
LYMPHOCYTES # BLD AUTO: 2.6 X10ˆ3/UL (ref 1–4)
LYMPHOCYTES NFR BLD AUTO: 24.7 %
MCH RBC QN AUTO: 21.8 PG (ref 26–34)
MCHC RBC AUTO-ENTMCNC: 31.9 G/DL (ref 31–37)
MCV RBC AUTO: 68.4 FL (ref 80–100)
MIXED CELL %: 6.4 %
NEUTROPHILS # BLD AUTO: 7.3 X10ˆ3/UL (ref 1.5–7.7)
NEUTROPHILS NFR BLD AUTO: 68.9 %
PLATELET # BLD AUTO: 467 X10ˆ3/UL (ref 150–450)
POTASSIUM BLD-SCNC: 3.2 MMOL/L (ref 3.6–5.1)
RBC # BLD AUTO: 5.5 X10ˆ6/UL (ref 3.8–5.3)
SODIUM BLD-SCNC: 141 MMOL/L (ref 136–145)
WBC # BLD AUTO: 10.6 X10ˆ3/UL (ref 4–11)

## 2025-07-06 PROCEDURE — 73610 X-RAY EXAM OF ANKLE: CPT | Performed by: RADIOLOGY

## 2025-07-06 PROCEDURE — 99213 OFFICE O/P EST LOW 20 MIN: CPT

## 2025-07-06 PROCEDURE — 73630 X-RAY EXAM OF FOOT: CPT | Performed by: RADIOLOGY

## 2025-07-06 PROCEDURE — 93971 EXTREMITY STUDY: CPT | Performed by: RADIOLOGY

## 2025-07-06 PROCEDURE — 85025 COMPLETE CBC W/AUTO DIFF WBC: CPT

## 2025-07-06 PROCEDURE — 80047 BASIC METABLC PNL IONIZED CA: CPT

## 2025-07-06 RX ORDER — IBUPROFEN 600 MG/1
600 TABLET, FILM COATED ORAL ONCE
Status: COMPLETED | OUTPATIENT
Start: 2025-07-06 | End: 2025-07-06

## 2025-07-06 RX ORDER — METHYLPREDNISOLONE 4 MG/1
TABLET ORAL
Qty: 1 EACH | Refills: 0 | Status: SHIPPED | OUTPATIENT
Start: 2025-07-06

## 2025-07-06 RX ORDER — POTASSIUM CHLORIDE 1500 MG/1
40 TABLET, EXTENDED RELEASE ORAL ONCE
Status: COMPLETED | OUTPATIENT
Start: 2025-07-06 | End: 2025-07-06

## 2025-07-06 NOTE — ED INITIAL ASSESSMENT (HPI)
Pt with bilateral foot and ankle swelling for a ~2 weeks. Pt reports pain to L foot. Pt denied injury.

## 2025-07-06 NOTE — ED PROVIDER NOTES
History     Chief Complaint   Patient presents with    Foot Pain       Subjective:   BUSHRA Mckeon NATALI Minaya, 45 year old female with notable medical history of tachycardia, thyroid nodules, HTN, migraines, OA, RA who presents with bilateral foot and ankle swelling for 2 weeks.  Patient reports left foot is more swollen, painful, and has minor tingling.  Patient denies any injury.  Denies any calf pain.  Reports chronic back pain.  Denies any recent travel, prolonged immobilization, hospitalization.  History of DVT or count.  Denies any unusual palpitations.  She does report that she has tachycardia intermittent palpitations for which she takes metoprolol.  She denies any chest pain or shortness of breath.  She denies any history of any kidney or liver disease.  Does take methotrexate for her rheumatoid arthritis.  Has not taken her medications this morning.      Problem List[1]   Objective:   Past Medical History:    Anesthesia complication    increase heart rate after anesthesia    Arrhythmia    TACHYCARDIA    Asthma (HCC)    Back problem    Colon polyp    repeat CLN in     Constipation    Disorder of thyroid    thyroid nodules    Esophageal reflux    Essential hypertension    Helicobacter positive gastritis    on abx    High blood pressure    Migraines    Osteoarthritis    RA    Palpitations    Visual impairment    GLASSES              Past Surgical History:   Procedure Laterality Date    Arthroscopy of joint unlisted Left     knee x 2          5 yrs ago    Cholecystectomy      Colonoscopy N/A 2024    Procedure: COLONOSCOPY;  Surgeon: NATALI Angeles MD;  Location: [facility] ENDOSCOPY    Colonoscopy N/A 2025    Procedure: COLONOSCOPY;  Surgeon: NATALI Angeles MD;  Location: [facility] ENDOSCOPY    Tubal ligation                      Social History     Socioeconomic History    Marital status:    Tobacco Use    Smoking status: Never    Smokeless tobacco: Never   Vaping Use    Vaping  status: Never Used   Substance and Sexual Activity    Alcohol use: No     Comment: RARE    Drug use: No   Other Topics Concern    Caffeine Concern Yes     Comment: Coffee: 1 cup daily    Exercise No   Social History Narrative    The patient does not use an assistive device..      The patient does not live in a home with stairs.     Social Drivers of Health     Food Insecurity: Low Risk  (10/9/2024)    Received from [facility]    Food Insecurity     Within the past 12 months, you worried that your food would run out before you got money to buy more.  : Never true     Within the past 12 months, the food you bought just didn't last and you didn't have money to get more. : Never true   Transportation Needs: Not At Risk (10/9/2024)    Received from [facility]    Transportation Needs     In the past 12 months, has lack of reliable transportation kept you from medical appointments, meetings, work or from getting things needed for daily living? : No              Medications Ordered Prior to Encounter[2]      Constitutional and vital signs reviewed.      All other systems reviewed and negative except as noted above.    I have reviewed the family history, social history, allergies, and outpatient medications.     History reviewed from EMR: Encounters, problem list, allergies, medications      Physical Exam     ED Triage Vitals [07/06/25 0804]   BP (!) 158/98   Pulse 116   Resp 18   Temp 98.6 °F (37 °C)   Temp src Oral   SpO2 100 %   O2 Device None (Room air)       Current:BP (!) 158/98   Pulse 116   Temp 98.6 °F (37 °C) (Oral)   Resp 18   LMP  (LMP Unknown)   SpO2 100%       Physical Exam  Vitals and nursing note reviewed.   Constitutional:       General: She is not in acute distress.     Appearance: Normal appearance. She is not ill-appearing or toxic-appearing.   HENT:      Head: Normocephalic and atraumatic.      Right Ear: External ear normal.      Left Ear: External ear normal.      Nose:  Nose normal.   Eyes:      General:         Right eye: No discharge.         Left eye: No discharge.   Cardiovascular:      Rate and Rhythm: Regular rhythm. Tachycardia present.      Pulses: Normal pulses.      Heart sounds: Normal heart sounds.   Pulmonary:      Effort: Pulmonary effort is normal.      Breath sounds: Normal breath sounds.   Musculoskeletal:      Cervical back: Normal range of motion and neck supple.      Comments: Left ankle and foot with 2+ pitting edema, right foot with 1+ pitting edema. There is no leg or calf swelling.  There is no signs of erythema.  There are no rashes or lesions.  Tenderness to palpation to the left lateral ankle and foot.  Does no feel hot to touch   Skin:     General: Skin is warm and dry.      Capillary Refill: Capillary refill takes less than 2 seconds.      Coloration: Skin is not jaundiced or pale.   Neurological:      General: No focal deficit present.      Mental Status: She is alert and oriented to person, place, and time.   Psychiatric:         Mood and Affect: Mood normal.         Behavior: Behavior normal.            ED Course     Labs Reviewed   POCT CBC - Abnormal; Notable for the following components:       Result Value    RBC IC 5.50 (*)     MCV IC 68.4 (*)     MCH IC 21.8 (*)     PLT .0 (*)     All other components within normal limits   POCT ISTAT CHEM8 CARTRIDGE - Abnormal; Notable for the following components:    ISTAT Potassium 3.2 (*)     All other components within normal limits     US VENOUS DOPPLER LEG LEFT - DIAG IMG (CPT=93971)   Final Result   PROCEDURE: US VENOUS DOPPLER LEG LEFT - DIAG IMG (CPT=93971)      INDICATIONS: left lower extremity swelling, atruamtic, hx of tachycardia    and RA      COMPARISON: None      TECHNIQUE: Color duplex Doppler venous ultrasound of the left lower    extremity was performed in the usual manner.         FINDINGS: The femoral and popliteal veins are normal. Normal flow was    demonstrated with color and  pulsed Doppler. Visualized portions of deep    calf veins and saphenous veins are normal      LEFT LOWER EXTREMITY      THROMBI: None visible.   COMPRESSIBILITY: Normal.   OTHER:Negative.      =====   CONCLUSION: No left lower extremity DVT.       Electronically Verified and Signed by Attending Radiologist: Caio Chung MD 7/6/2025 12:42 PM   Workstation: [id number]      XR FOOT, COMPLETE (MIN 3 VIEWS), LEFT (CPT=73630)   Final Result   PROCEDURE: XR FOOT, COMPLETE (MIN 3 VIEWS), LEFT (CPT=73630)      INDICATIONS: Left foot pain and swelling ongoing for 2 weeks; history of    rheumatoid authorities. No precipitating antecedent injury.      COMPARISON: 07/06/2025 XR ANKLE (MIN 3 VIEWS), LEFT (CPT=73610)      TECHNIQUE: 3 radiographs of the left foot were obtained.      FINDINGS:   BONES: No acute fracture or dislocation is evident. No suspicious osseous    lesions are seen. The joint spaces are preserved. No osseous erosions are    demonstrated. No significant periarticular osteopenia is observed. No    hyperostotic bone formation is    evident. Calcaneal enthesopathy is seen at the insertion of the Achilles    tendon and at the origin of the plantar aponeurosis.      SOFT TISSUES: Circumferential soft tissue swelling is apparent. Negative    for discernible radiopaque foreign body.      EFFUSION: None visible.         =====   CONCLUSION:    1.  Soft tissue swelling of the left foot without radiographic evidence of    underlying osseous injury.   2.     3.  Negative for significant erosive arthropathy.      Electronically Verified and Signed by Attending Radiologist: Sergio Angeles MD 7/6/2025 9:32 AM   Workstation: [id number]      XR ANKLE (MIN 3 VIEWS), LEFT (CPT=73610)   Final Result   PROCEDURE: XR ANKLE (MIN 3 VIEWS), LEFT (CPT=73610)      INDICATIONS: Left foot ankle pain with swelling over the preceding, with    history of rheumatoid arthritis.      COMPARISON: 07/06/2025 XR FOOT, COMPLETE (MIN  3 VIEWS), LEFT (CPT=73630)      TECHNIQUE: 3 radiographs of the left ankle were obtained.      FINDINGS:   BONES: No acute fracture or dislocation is evident. No suspicious osseous    lesions are seen. The ankle mortise is maintained. The joint spaces are    preserved. Calcaneal enthesopathy is seen at the origin of the plantar    aponeurosis.      SOFT TISSUES: Circumferential soft tissue swelling is apparent. Negative    for discernible radiopaque foreign body.      EFFUSION: None visible.         =====   CONCLUSION:   1.  Soft tissue swelling of the left ankle without radiographic evidence    of underlying osseous injury.   2.     3.  Negative for radiographic evidence of advanced arthropathy or erosive    manifestations.      4.  Left calcaneal enthesopathy.      Electronically Verified and Signed by Attending Radiologist: Sergio Angeles MD 7/6/2025 9:20 AM   Workstation: [id number]          Vitals:    07/06/25 0804   BP: (!) 158/98   Pulse: 116   Resp: 18   Temp: 98.6 °F (37 °C)   TempSrc: Oral   SpO2: 100%            [facility]        Linda Diamondman, 45 year old female with medical history as noted above who presents with bilateral foot and ankle swelling left ankle and foot pain.    -NAD.  Hypertensive and mildly tachycardic but she did not take morning medications which include amlodipine and metoprolol.    - On exam there is pitting edema bilateral ankles and feet but more prominent on the left.  To moderate tenderness over the lateral malleolus and the dorsal foot.  Neurovascular exam is intact.  Capillary refill is 2 seconds.  No calf tenderness or Homans' sign.  No Open wounds or skin breakdown.  No Signs of cellulitis or abscess.   - Differential diagnosis considered but not limited to venous insufficiency, dependent edema, amlodipine related swelling, DVT, early cellulitis, heart failure, RA flare, renal disease, gout or pseudogout, other  - Given history of rheumatoid arthritis and use of methotrexate a  CBC was ordered. BMP with normal Cr. Low potassium. Repleted in clinic.   - Given duration of symptoms afebrile, negative DVT.  Treat with Medrol pack for inflammation and potential gout.  Noted to elevate extremities at night.  Discussed findings with patient.  Reports she has already scheduled an appointment with her primary care doctor and her rheumatologist for further evaluation of leg swelling.  -The patient is encouraged to return if any concerning symptoms arise. Additional verbal discharge instructions are given and discussed. Discharge medications are discussed. The patient is in good condition throughout the visit today and remains so upon discharge. I discuss the plan of care with the patient, who expresses understanding. All questions and concerns are addressed to the patient's satisfaction prior to discharge today. ED precautions discussed.  Follow-up with primary care doctor for further evaluation and assessment of symptoms.  -case discussed with Trini Gonsales           ** See ED course below for additional information on care provided / interventions / notable events throughout patient's encounter.           ** I have independently reviewed the radiology images, clinical lab results, and ECG tracings as described above (if applicable)    ** Concerning co-morbidities possibly affecting complaint / care: RA        Medical Decision Making  Amount and/or Complexity of Data Reviewed  Labs: ordered. Decision-making details documented in ED Course.  Radiology: ordered. Decision-making details documented in ED Course.    Risk  OTC drugs.  Prescription drug management.        Disposition and Plan     Disposition:  Discharge  7/6/2025  1:05 pm    Clinical Impression:  1. Left foot pain    2. Acute left ankle pain    3. Left leg swelling    4. Pseudogout           Follow-up:  Og Davison  [facility]RO [address] [address]  [phone]                Medications Prescribed:  Current  Discharge Medication List        START taking these medications    Details   methylPREDNISolone (MEDROL) 4 MG Oral Tablet Therapy Pack Dosepack: take as directed  Qty: 1 each, Refills: 0               ALLI Deluca, APRN, FNP-BC  Family Nurse Practitioner  [facility]      The above patient (and/or guardian) was made aware that an appropriate evaluation has been performed, and that no additional testing is required at this time. In my medical judgment, there is currently no evidence of an immediate life-threatening or surgical condition, therefore discharge is indicated at this time. The patient (and/or guardian) was advised that a small risk still exists that a serious condition could develop. The patient was instructed to arrange close follow-up with their primary care provider (or the referral provider given today). The patient received written and verbal instructions regarding their condition / concerns, demonstrated understanding, and is agreement with the outpatient treatment plan.            [1]   Patient Active Problem List  Diagnosis    Lumbar disc herniation with radiculopathy    Left thyroid nodule    Left flank tenderness    Tachycardia    Patellofemoral pain syndrome of left knee    Patellar tendinitis of left knee    Tenderness of left patella    Spinal stenosis of lumbar region with neurogenic claudication    Asthma (HCC)    Complicated migraine    Uterine leiomyoma    Low blood sugar    Isolated proteinuria    Encounter for female sterilization procedure    Lumbar trigger point syndrome    Myalgia    Lumbar spondylosis    Lumbar foraminal stenosis    Bulge of lumbar disc without myelopathy    DDD (degenerative disc disease), lumbosacral    Class 2 severe obesity due to excess calories with serious comorbidity and body mass index (BMI) of 37.0 to 37.9 in adult (HCC)    Migraine without aura and without status migrainosus, not intractable    Pharyngoesophageal dysphagia    Vitamin D  deficiency    Other specified anemias    Metrorrhagia    Menorrhagia    UTI symptoms    Carpal tunnel syndrome of left wrist    Gallbladder polyp    Gastroesophageal reflux disease without esophagitis    Hiatal hernia    Tortuous colon    Bilateral sciatica    Neck pain    Polyp of colon    Arthritis, rheumatoid (HCC)    Multinodular goiter (nontoxic)    Iron deficiency anemia    Hypertension    Supraventricular tachycardia (HCC)   [2]   No current facility-administered medications on file prior to encounter.     Current Outpatient Medications on File Prior to Encounter   Medication Sig Dispense Refill    amLODIPine 5 MG Oral Tab Take 1 tablet (5 mg total) by mouth daily. 90 tablet 3    METHOTREXATE 2.5 MG Oral Tab Take 8 tablets (20 mg total) by mouth once a week. 104 tablet 0    [] traMADol 50 MG Oral Tab Take 1 tablet (50 mg total) by mouth nightly as needed for Pain. 7 tablet 0    Amitriptyline HCl 150 MG Oral Tab Take 1 tablet (150 mg total) by mouth nightly. 90 tablet 3    [] predniSONE 20 MG Oral Tab Take 3 tablets (60 mg total) by mouth daily for 4 days, THEN 2 tablets (40 mg total) daily for 4 days, THEN 1 tablet (20 mg total) daily for 4 days. 24 tablet 0    pregabalin 150 MG Oral Cap Take 1 capsule (150 mg total) by mouth 2 (two) times daily. 180 capsule 1    Rimegepant Sulfate (NURTEC) 75 MG Oral Tablet Dispersible Take 75 mg by mouth as needed. Take one tablet at onset of migraine.  Maximum dose in 24 hours is 1 tablet (75mg). 8 tablet 11    topiramate 50 MG Oral Tab Take 1 tablet (50 mg total) by mouth 2 (two) times daily. 180 tablet 1    folic acid 1 MG Oral Tab Take 1 tablet (1 mg total) by mouth daily. 90 tablet 1    albuterol 108 (90 Base) MCG/ACT Inhalation Aero Soln Inhale 2 puffs into the lungs every 4 (four) hours as needed for Wheezing. 1 each 0    metoprolol succinate ER 25 MG Oral Tablet 24 Hr Take 1 tablet (25 mg total) by mouth daily. 90 tablet 0    Cholecalciferol (VITAMIN  D) 1000 units Oral Tab

## 2025-07-08 ENCOUNTER — OFFICE VISIT (OUTPATIENT)
Age: 45
End: 2025-07-08
Payer: COMMERCIAL

## 2025-07-08 VITALS
HEART RATE: 119 BPM | SYSTOLIC BLOOD PRESSURE: 150 MMHG | HEIGHT: 61 IN | OXYGEN SATURATION: 99 % | DIASTOLIC BLOOD PRESSURE: 82 MMHG | WEIGHT: 218 LBS | BODY MASS INDEX: 41.16 KG/M2

## 2025-07-08 DIAGNOSIS — R22.42 LOCALIZED SWELLING OF LEFT FOOT: ICD-10-CM

## 2025-07-08 DIAGNOSIS — E87.6 HYPOKALEMIA: ICD-10-CM

## 2025-07-08 DIAGNOSIS — I10 ESSENTIAL HYPERTENSION: Primary | ICD-10-CM

## 2025-07-08 DIAGNOSIS — M25.572 ACUTE LEFT ANKLE PAIN: ICD-10-CM

## 2025-07-08 PROCEDURE — 99214 OFFICE O/P EST MOD 30 MIN: CPT | Performed by: INTERNAL MEDICINE

## 2025-07-08 PROCEDURE — 3008F BODY MASS INDEX DOCD: CPT | Performed by: INTERNAL MEDICINE

## 2025-07-08 PROCEDURE — 3077F SYST BP >= 140 MM HG: CPT | Performed by: INTERNAL MEDICINE

## 2025-07-08 PROCEDURE — 3079F DIAST BP 80-89 MM HG: CPT | Performed by: INTERNAL MEDICINE

## 2025-07-08 RX ORDER — LOSARTAN POTASSIUM 50 MG/1
50 TABLET ORAL DAILY
Qty: 30 TABLET | Refills: 5 | Status: SHIPPED | OUTPATIENT
Start: 2025-07-08

## 2025-07-08 NOTE — PROGRESS NOTES
History of Present Illness  Linda Minaya is a 45 year old female with hypertension who presents with left foot swelling and pain.    Over the last few weeks, she has experienced swelling in both feet, with the left foot being more swollen than the right. She visited urgent care on Sunday due to this issue. Blood work was performed, and she was sent to the hospital for an ultrasound to check for blood clots, which returned normal results.    The swelling started gradually, with the left foot becoming notably swollen and sore on the top. An x-ray of both feet was conducted, which did not reveal any abnormalities to account for the soreness. No recent injuries or falls that could have contributed to the swelling.    She was prescribed a Medrol Dosepak, as there was a consideration of gout being the cause of her symptoms. However, there has been no improvement in her symptoms after two days of treatment. Her potassium levels were found to be low, and she has a history of low potassium levels.    She is currently taking amlodipine 5 mg and metoprolol for hypertension. There is a discussion about the possibility of amlodipine contributing to the swelling. She has been on amlodipine since May and is also taking metoprolol.    Her blood pressure readings at home have been stable since consistently taking her blood pressure medications, although her blood pressure was elevated during a recent visit to a neurosurgeon.             Patient Active Problem List    Diagnosis Date Noted    Polyp of colon 05/14/2025    Bilateral sciatica 01/16/2025    Neck pain 01/16/2025    Gastroesophageal reflux disease without esophagitis 11/12/2024    Hiatal hernia 11/12/2024    Tortuous colon 11/12/2024    Gallbladder polyp 05/21/2024    Hypertension 05/14/2024    Supraventricular tachycardia (HCC) 05/14/2024    Multinodular goiter (nontoxic) 04/12/2024    Arthritis, rheumatoid (HCC) 03/01/2024    Carpal tunnel syndrome of left wrist  06/19/2023    UTI symptoms 02/09/2023    Iron deficiency anemia 11/20/2022    Menorrhagia 09/21/2022    Vitamin D deficiency 05/11/2022    Other specified anemias 05/11/2022    Metrorrhagia 05/11/2022    Pharyngoesophageal dysphagia 04/11/2022    Migraine without aura and without status migrainosus, not intractable 02/11/2022    Lumbar trigger point syndrome 09/01/2021    Myalgia 09/01/2021    Lumbar spondylosis 09/01/2021    Lumbar foraminal stenosis 09/01/2021    Bulge of lumbar disc without myelopathy 09/01/2021    DDD (degenerative disc disease), lumbosacral 09/01/2021    Class 2 severe obesity due to excess calories with serious comorbidity and body mass index (BMI) of 37.0 to 37.9 in adult ([facility]) 09/01/2021    Encounter for female sterilization procedure 10/16/2020    Isolated proteinuria 05/21/2020    Low blood sugar 05/09/2020    Asthma ([facility]) 09/12/2019    Complicated migraine 09/12/2019    Spinal stenosis of lumbar region with neurogenic claudication 08/24/2019    Patellofemoral pain syndrome of left knee 06/12/2019    Patellar tendinitis of left knee 06/12/2019    Tenderness of left patella 06/12/2019    Tachycardia 06/08/2019    Left flank tenderness 09/29/2018    Uterine leiomyoma 08/30/2018    Lumbar disc herniation with radiculopathy 08/19/2018    Left thyroid nodule 08/19/2018        Medications - Current[1]     Vitals:    07/08/25 1407   BP: 150/82   Pulse: 119   VITALSBody mass index is 41.19 kg/m².      General: looks healthy    Affect  and  cognition  normal       Neck;  nl     Lungs: clear     Heart: Regular       Left  top of foot  swelling  - tenderness  lateral side  not red  no cellulitis                         Linda was seen today for swelling.    Diagnoses and all orders for this visit:    Essential hypertension    Localized swelling of left foot    Acute left ankle pain    Hypokalemia    Other orders  -     losartan 50 MG Oral Tab; Take 1 tablet (50 mg total) by mouth daily.        Assessment & Plan  Left foot swelling and pain  Ultrasound ruled out thrombosis, x-rays normal. Differential includes gout and amlodipine-induced edema. Medrol ineffective for gout. Amlodipine at 5 mg unlikely to cause unilateral symptoms. Pain and tenderness suggest gout, absence of erythema and infection suggests arthritis. Amlodipine-induced edema typically painless and bilateral. Losartan may address edema and hypokalemia, improving hypertension control.  - Continue Medrol Dosepak for inflammation.  - Initiate low-dose losartan for potential amlodipine-induced edema and hypokalemia.  - Monitor blood pressure and potassium levels.  - Consider colchicine if swelling persists after one week.  - Follow-up in two weeks to reassess swelling and pain.    Essential hypertension  Hypertension with recent reading of 175/100 mmHg. Currently on amlodipine and metoprolol. Losartan addition expected to synergize with amlodipine for improved blood pressure control and hypokalemia management. Losartan may prevent renal complications and cerebrovascular events.  - Add losartan to the regimen of amlodipine and metoprolol.  - Monitor blood pressure at home and report significant changes.    Hypokalemia  Low potassium levels, potentially exacerbated by prednisone. Losartan addition expected to aid in potassium retention while managing hypertension.  - Add losartan to aid in potassium retention.  - Monitor potassium levels.             This note was prepared using Dragon [employer] voice recognition dictation software and as a result, errors may occur. When identified, these errors have been corrected. While every attempt is made to correct errors during dictation, discrepancies may still exist          [1]   Current Outpatient Medications:     losartan 50 MG Oral Tab, Take 1 tablet (50 mg total) by mouth daily., Disp: 30 tablet, Rfl: 5    methylPREDNISolone (MEDROL) 4 MG Oral Tablet Therapy Pack, Dosepack: take as directed, Disp: 1  each, Rfl: 0    amLODIPine 5 MG Oral Tab, Take 1 tablet (5 mg total) by mouth daily., Disp: 90 tablet, Rfl: 3    METHOTREXATE 2.5 MG Oral Tab, Take 8 tablets (20 mg total) by mouth once a week., Disp: 104 tablet, Rfl: 0    Amitriptyline HCl 150 MG Oral Tab, Take 1 tablet (150 mg total) by mouth nightly., Disp: 90 tablet, Rfl: 3    pregabalin 150 MG Oral Cap, Take 1 capsule (150 mg total) by mouth 2 (two) times daily., Disp: 180 capsule, Rfl: 1    Rimegepant Sulfate (NURTEC) 75 MG Oral Tablet Dispersible, Take 75 mg by mouth as needed. Take one tablet at onset of migraine.  Maximum dose in 24 hours is 1 tablet (75mg)., Disp: 8 tablet, Rfl: 11    topiramate 50 MG Oral Tab, Take 1 tablet (50 mg total) by mouth 2 (two) times daily., Disp: 180 tablet, Rfl: 1    folic acid 1 MG Oral Tab, Take 1 tablet (1 mg total) by mouth daily., Disp: 90 tablet, Rfl: 1    albuterol 108 (90 Base) MCG/ACT Inhalation Aero Soln, Inhale 2 puffs into the lungs every 4 (four) hours as needed for Wheezing., Disp: 1 each, Rfl: 0    metoprolol succinate ER 25 MG Oral Tablet 24 Hr, Take 1 tablet (25 mg total) by mouth daily., Disp: 90 tablet, Rfl: 0    Cholecalciferol (VITAMIN D) 1000 units Oral Tab, , Disp: , Rfl:

## 2025-07-27 DIAGNOSIS — M79.2 NEUROPATHIC PAIN: ICD-10-CM

## 2025-07-27 DIAGNOSIS — M54.32 BILATERAL SCIATICA: ICD-10-CM

## 2025-07-27 DIAGNOSIS — M54.31 BILATERAL SCIATICA: ICD-10-CM

## 2025-07-27 DIAGNOSIS — M54.2 NECK PAIN: ICD-10-CM

## 2025-07-28 RX ORDER — PREGABALIN 150 MG/1
150 CAPSULE ORAL 2 TIMES DAILY
Qty: 180 CAPSULE | Refills: 0 | Status: SHIPPED | OUTPATIENT
Start: 2025-07-28

## 2025-07-28 NOTE — TELEPHONE ENCOUNTER
The patient is requesting a refill on: Pregabalin 150 Mg Cap Nova     [place] to verify    Last OV: 1/16/25 TELE 4/28/25  Next OV: 9/25/25  Future Appointments   Date Time Provider Department Center   7/29/2025 10:40 AM Behar, Alex, MD PM[facility]R ADO  [facility]   8/22/2025  1:00 PM Shweta Monson MD [facility]   9/8/2025 11:30 AM Kim Cradnall MD [facility]   9/25/2025 10:40 AM Dougie Lam DO [facility]    Assessment  Linda Minaya is a very pleasant  44 year old right-handed woman w/ a pmhx of thyroid nodules, hypertension, L3 neuroforaminal stenosis, chronic low back pain, migraine, left-sided action tremors, transient left hemiparesis/hemisensory loss, and left-sided carpal tunnel syndrome evaluated for return of her left-sided tremors (now worse w/ pill rolling), neck pain, muscle pain, chronic low back pain, migraines, and chronic insomnia.  Her grandmother  and mother have Parkinson disease.      Patient reports that her handwriting is now shakier, her right hand.  She did not specifically mention any right-sided tremors, and none were appreciated on exam.  On exam she had a postural myoclonic tremor left hand.  No rigidity. She pérez not have sx of PD. Remainder of exam is nonfocal.     Etiology of her tremor is unclear.  one possibility she may have a myoclonic or dystonic tremor.  Treatment for dystonic tremor is the same as essential tremor.  Would avoid propranolol given her use of inhaler and ?possible asthma.  Second line medication is gabapentin, which he said has not helped her back pain in the past.  However she is never been on the prescribed dose (300 mg 3 times daily).  Hopefully this can help her tremor and improve her chronic low back pain.       Her migraines remain disabling.  She is having at least 12 headache days a month.  Sumatriptan did not provide relief, and she has been using Excedrin Migraine  as rescue.will  switch her to [facility] for rescue.  She is not on a prophylactic agent.  Consider using Topamax, but it may not be as effective for neuropathic pain.       Her low back pain is  MORE bothersome than her migraine pain, therefore we will start her on gabapentin.  Stressed importance of lifestyle changes to decrease her migraine frequency including maintaining hydration (specifically advised her to drink 88 ounces of water a day and to keep track of how much water she drinks), regular sleep schedule, and not skipping meals.  We will continue amitriptyline for insomnia.  Screen for RAYRAY, her [place] Sleepiness Scale score was 0.  In the future will refer to sleep psychology.  She is aware of the adverse effects of amitriptyline.     Discussed the risk of  serotonin syndrome with amitriptyline and tramadol.   Will give her a short course of tramadol until she see Dr. Behar.  Will also write a rx for prednisone. She is aware of the red flags that would warrant her going to the ED.     Plan  1. Bilateral sciatica  - traMADol 50 MG Oral Tab; Take 1 tablet (50 mg total) by mouth nightly as needed for Pain.  Dispense: 7 tablet; Refill: 0  - predniSONE 20 MG Oral Tab; Take 3 tablets (60 mg total) by mouth daily for 4 days, THEN 2 tablets (40 mg total) daily for 4 days, THEN 1 tablet (20 mg total) daily for 4 days.  Dispense: 24 tablet; Refill: 0     2. Migraine without aura and without status migrainosus, not intractable  - Amitriptyline HCl 150 MG Oral Tab; Take 1 tablet (150 mg total) by mouth nightly.  Dispense: 90 tablet; Refill: 3     3. Neuropathic pain  - Amitriptyline HCl 150 MG Oral Tab; Take 1 tablet (150 mg total) by mouth nightly.  Dispense: 90 tablet; Refill: 3     4. Insomnia, unspecified type  - Amitriptyline HCl 150 MG Oral Tab; Take 1 tablet (150 mg total) by mouth nightly.  Dispense: 90 tablet; Refill: 3     5. Neck pain

## 2025-07-29 ENCOUNTER — OFFICE VISIT (OUTPATIENT)
Dept: PHYSICAL MEDICINE AND REHAB | Facility: CLINIC | Age: 45
End: 2025-07-29
Payer: COMMERCIAL

## 2025-07-29 VITALS
OXYGEN SATURATION: 98 % | BODY MASS INDEX: 41.16 KG/M2 | SYSTOLIC BLOOD PRESSURE: 154 MMHG | HEIGHT: 61 IN | WEIGHT: 218 LBS | HEART RATE: 108 BPM | DIASTOLIC BLOOD PRESSURE: 90 MMHG

## 2025-07-29 DIAGNOSIS — M51.16 LUMBAR DISC HERNIATION WITH RADICULOPATHY: ICD-10-CM

## 2025-07-29 DIAGNOSIS — E66.813 CLASS 3 SEVERE OBESITY DUE TO EXCESS CALORIES WITH SERIOUS COMORBIDITY AND BODY MASS INDEX (BMI) OF 40.0 TO 44.9 IN ADULT: Primary | ICD-10-CM

## 2025-07-29 DIAGNOSIS — M47.816 FACET SYNDROME, LUMBAR: ICD-10-CM

## 2025-07-29 DIAGNOSIS — M79.10 MYALGIA: ICD-10-CM

## 2025-07-29 DIAGNOSIS — M54.59 MECHANICAL LOW BACK PAIN: ICD-10-CM

## 2025-07-29 DIAGNOSIS — M51.369 BULGE OF LUMBAR DISC WITHOUT MYELOPATHY: ICD-10-CM

## 2025-07-29 DIAGNOSIS — M99.9 BIOMECHANICAL LESION: ICD-10-CM

## 2025-07-29 PROCEDURE — 3080F DIAST BP >= 90 MM HG: CPT | Performed by: PHYSICAL MEDICINE & REHABILITATION

## 2025-07-29 PROCEDURE — 3008F BODY MASS INDEX DOCD: CPT | Performed by: PHYSICAL MEDICINE & REHABILITATION

## 2025-07-29 PROCEDURE — 99204 OFFICE O/P NEW MOD 45 MIN: CPT | Performed by: PHYSICAL MEDICINE & REHABILITATION

## 2025-07-29 PROCEDURE — 3077F SYST BP >= 140 MM HG: CPT | Performed by: PHYSICAL MEDICINE & REHABILITATION

## 2025-07-29 RX ORDER — CYCLOBENZAPRINE HCL 5 MG
TABLET ORAL
Qty: 90 TABLET | Refills: 0 | Status: SHIPPED | OUTPATIENT
Start: 2025-07-29

## 2025-07-31 ENCOUNTER — TELEPHONE (OUTPATIENT)
Dept: PHYSICAL THERAPY | Age: 45
End: 2025-07-31

## 2025-08-09 ENCOUNTER — HOSPITAL ENCOUNTER (OUTPATIENT)
Dept: GENERAL RADIOLOGY | Age: 45
Discharge: HOME | End: 2025-08-09
Attending: PHYSICAL MEDICINE & REHABILITATION

## 2025-08-09 ENCOUNTER — APPOINTMENT (OUTPATIENT)
Dept: GENERAL RADIOLOGY | Facility: HOSPITAL | Age: 45
End: 2025-08-09
Attending: PHYSICAL MEDICINE & REHABILITATION

## 2025-08-09 ENCOUNTER — LAB ENCOUNTER (OUTPATIENT)
Dept: LAB | Age: 45
End: 2025-08-09
Attending: PHYSICAL MEDICINE & REHABILITATION

## 2025-08-09 DIAGNOSIS — M99.9 BIOMECHANICAL LESION: ICD-10-CM

## 2025-08-09 DIAGNOSIS — M51.16 LUMBAR DISC HERNIATION WITH RADICULOPATHY: ICD-10-CM

## 2025-08-09 DIAGNOSIS — M79.10 MYALGIA: ICD-10-CM

## 2025-08-09 DIAGNOSIS — M51.369 BULGE OF LUMBAR DISC WITHOUT MYELOPATHY: ICD-10-CM

## 2025-08-09 DIAGNOSIS — M54.59 MECHANICAL LOW BACK PAIN: ICD-10-CM

## 2025-08-09 DIAGNOSIS — E87.6 HYPOKALEMIA: ICD-10-CM

## 2025-08-09 DIAGNOSIS — I10 ESSENTIAL HYPERTENSION: ICD-10-CM

## 2025-08-09 DIAGNOSIS — Z51.81 MEDICATION MONITORING ENCOUNTER: ICD-10-CM

## 2025-08-09 DIAGNOSIS — M47.816 FACET SYNDROME, LUMBAR: ICD-10-CM

## 2025-08-09 DIAGNOSIS — M05.79 RHEUMATOID ARTHRITIS INVOLVING MULTIPLE SITES WITH POSITIVE RHEUMATOID FACTOR (HCC): ICD-10-CM

## 2025-08-09 DIAGNOSIS — E66.813 CLASS 3 SEVERE OBESITY DUE TO EXCESS CALORIES WITH SERIOUS COMORBIDITY AND BODY MASS INDEX (BMI) OF 40.0 TO 44.9 IN ADULT: ICD-10-CM

## 2025-08-09 LAB
ALBUMIN SERPL-MCNC: 4.8 G/DL (ref 3.2–4.8)
ALT SERPL-CCNC: 14 U/L (ref 10–49)
AST SERPL-CCNC: 15 U/L (ref ?–34)
BASOPHILS # BLD AUTO: 0.08 X10(3) UL (ref 0–0.2)
BASOPHILS NFR BLD AUTO: 0.8 %
CREAT BLD-MCNC: 0.95 MG/DL (ref 0.55–1.02)
CRP SERPL-MCNC: 2.4 MG/DL (ref ?–0.5)
DEPRECATED RDW RBC AUTO: 42.3 FL (ref 35.1–46.3)
EGFRCR SERPLBLD CKD-EPI 2021: 75 ML/MIN/1.73M2 (ref 60–?)
EOSINOPHIL # BLD AUTO: 0.13 X10(3) UL (ref 0–0.7)
EOSINOPHIL NFR BLD AUTO: 1.3 %
ERYTHROCYTE [DISTWIDTH] IN BLOOD BY AUTOMATED COUNT: 16.6 % (ref 11–15)
ERYTHROCYTE [SEDIMENTATION RATE] IN BLOOD: 98 MM/HR (ref 0–20)
HCT VFR BLD AUTO: 38.3 % (ref 35–48)
HGB BLD-MCNC: 11.6 G/DL (ref 12–16)
IMM GRANULOCYTES # BLD AUTO: 0.08 X10(3) UL (ref 0–1)
IMM GRANULOCYTES NFR BLD: 0.8 %
LYMPHOCYTES # BLD AUTO: 1.82 X10(3) UL (ref 1–4)
LYMPHOCYTES NFR BLD AUTO: 18.3 %
MCH RBC QN AUTO: 21.7 PG (ref 26–34)
MCHC RBC AUTO-ENTMCNC: 30.3 G/DL (ref 31–37)
MCV RBC AUTO: 71.7 FL (ref 80–100)
MONOCYTES # BLD AUTO: 0.64 X10(3) UL (ref 0.1–1)
MONOCYTES NFR BLD AUTO: 6.4 %
NEUTROPHILS # BLD AUTO: 7.18 X10 (3) UL (ref 1.5–7.7)
NEUTROPHILS # BLD AUTO: 7.18 X10(3) UL (ref 1.5–7.7)
NEUTROPHILS NFR BLD AUTO: 72.4 %
PLATELET # BLD AUTO: 488 10(3)UL (ref 150–450)
RBC # BLD AUTO: 5.34 X10(6)UL (ref 3.8–5.3)
WBC # BLD AUTO: 9.9 X10(3) UL (ref 4–11)

## 2025-08-09 PROCEDURE — 73522 X-RAY EXAM HIPS BI 3-4 VIEWS: CPT | Performed by: PHYSICAL MEDICINE & REHABILITATION

## 2025-08-09 PROCEDURE — 72110 X-RAY EXAM L-2 SPINE 4/>VWS: CPT | Performed by: PHYSICAL MEDICINE & REHABILITATION

## 2025-08-09 PROCEDURE — 84244 ASSAY OF RENIN: CPT | Performed by: INTERNAL MEDICINE

## 2025-08-09 PROCEDURE — 82088 ASSAY OF ALDOSTERONE: CPT | Performed by: INTERNAL MEDICINE

## 2025-08-14 LAB
ALDOSTERONE: 5.8 NG/DL
RENIN ACTIVITY: 0.66 NG/ML/HR

## 2025-08-22 ENCOUNTER — OFFICE VISIT (OUTPATIENT)
Age: 45
End: 2025-08-22

## 2025-08-22 VITALS
HEIGHT: 61 IN | BODY MASS INDEX: 41.16 KG/M2 | HEART RATE: 123 BPM | SYSTOLIC BLOOD PRESSURE: 135 MMHG | WEIGHT: 218 LBS | DIASTOLIC BLOOD PRESSURE: 84 MMHG

## 2025-08-22 DIAGNOSIS — M54.2 NECK PAIN: ICD-10-CM

## 2025-08-22 DIAGNOSIS — M05.79 RHEUMATOID ARTHRITIS INVOLVING MULTIPLE SITES WITH POSITIVE RHEUMATOID FACTOR (HCC): ICD-10-CM

## 2025-08-22 DIAGNOSIS — Z51.81 MEDICATION MONITORING ENCOUNTER: Primary | ICD-10-CM

## 2025-08-22 PROCEDURE — 3075F SYST BP GE 130 - 139MM HG: CPT | Performed by: INTERNAL MEDICINE

## 2025-08-22 PROCEDURE — 96372 THER/PROPH/DIAG INJ SC/IM: CPT | Performed by: INTERNAL MEDICINE

## 2025-08-22 PROCEDURE — 99214 OFFICE O/P EST MOD 30 MIN: CPT | Performed by: INTERNAL MEDICINE

## 2025-08-22 PROCEDURE — 3079F DIAST BP 80-89 MM HG: CPT | Performed by: INTERNAL MEDICINE

## 2025-08-22 PROCEDURE — 3008F BODY MASS INDEX DOCD: CPT | Performed by: INTERNAL MEDICINE

## 2025-08-22 RX ORDER — METHOTREXATE 2.5 MG/1
20 TABLET ORAL WEEKLY
Qty: 104 TABLET | Refills: 0 | Status: SHIPPED | OUTPATIENT
Start: 2025-08-22

## 2025-08-22 RX ORDER — FOLIC ACID 1 MG/1
1 TABLET ORAL DAILY
Qty: 90 TABLET | Refills: 1 | Status: SHIPPED | OUTPATIENT
Start: 2025-08-22

## 2025-08-22 RX ORDER — METHOTREXATE 2.5 MG/1
20 TABLET ORAL WEEKLY
Qty: 104 TABLET | Refills: 0 | Status: SHIPPED | OUTPATIENT
Start: 2025-08-22 | End: 2025-08-22

## 2025-08-22 RX ORDER — TRIAMCINOLONE ACETONIDE 40 MG/ML
40 INJECTION, SUSPENSION INTRA-ARTICULAR; INTRAMUSCULAR ONCE
Status: COMPLETED | OUTPATIENT
Start: 2025-08-22 | End: 2025-08-22

## 2025-08-22 RX ADMIN — TRIAMCINOLONE ACETONIDE 40 MG: 40 INJECTION, SUSPENSION INTRA-ARTICULAR; INTRAMUSCULAR at 12:38:00

## 2025-08-24 ENCOUNTER — TELEPHONE (OUTPATIENT)
Age: 45
End: 2025-08-24

## 2025-08-26 RX ORDER — ADALIMUMAB 40MG/0.4ML
40 KIT SUBCUTANEOUS
Qty: 1 EACH | Refills: 5 | Status: SHIPPED | OUTPATIENT
Start: 2025-08-26

## 2025-08-27 ENCOUNTER — TELEPHONE (OUTPATIENT)
Age: 45
End: 2025-08-27

## 2025-09-04 ENCOUNTER — APPOINTMENT (OUTPATIENT)
Dept: OBGYN | Age: 45
End: 2025-09-04

## 2025-11-24 ENCOUNTER — APPOINTMENT (OUTPATIENT)
Dept: OBGYN | Age: 45
End: 2025-11-24

## (undated) DEVICE — ULTRA FAST-FIX KNOT PUSHER/SUTURE                                    CUTTER STRAIGHT: Brand: FAST-FIX

## (undated) DEVICE — COVER SGL STRL LGHT HNDL BLU

## (undated) DEVICE — LIGASURE LAP MARYLAND 37CM

## (undated) DEVICE — SET TUBI Y FL CNTRL INFL/OTFL

## (undated) DEVICE — 60 ML SYRINGE REGULAR TIP: Brand: MONOJECT

## (undated) DEVICE — ENCORE® LATEX ACCLAIM SIZE 7.5, STERILE LATEX POWDER-FREE SURGICAL GLOVE: Brand: ENCORE

## (undated) DEVICE — MYOSURE LITE BLADE

## (undated) DEVICE — GAMMEX® PI HYBRID SIZE 6.5, STERILE POWDER-FREE SURGICAL GLOVE, POLYISOPRENE AND NEOPRENE BLEND: Brand: GAMMEX

## (undated) DEVICE — TROCAR: Brand: KII FIOS FIRST ENTRY

## (undated) DEVICE — TISSUE RETRIEVAL SYSTEM: Brand: INZII RETRIEVAL SYSTEM

## (undated) DEVICE — SOLUTION  .9 3000ML

## (undated) DEVICE — STERILE TETRA-FLEX CF, ELASTIC BANDAGE, 4" X 5.5YD: Brand: TETRA-FLEX™CF

## (undated) DEVICE — [HIGH FLOW INSUFFLATOR,  DO NOT USE IF PACKAGE IS DAMAGED,  KEEP DRY,  KEEP AWAY FROM SUNLIGHT,  PROTECT FROM HEAT AND RADIOACTIVE SOURCES.]: Brand: PNEUMOSURE

## (undated) DEVICE — SYRINGE, LUER SLIP, STERILE, 60ML: Brand: MEDLINE

## (undated) DEVICE — MEDI-VAC NON-CONDUCTIVE SUCTION TUBING: Brand: CARDINAL HEALTH

## (undated) DEVICE — STERILE SURGICAL LUBRICANT, METAL TUBE: Brand: SURGILUBE

## (undated) DEVICE — KIT ENDO ORCAPOD 160/180/190

## (undated) DEVICE — CLIP APPLIER WITH CLIP LOGIC TECHNOLOGY: Brand: ENDO CLIP III

## (undated) DEVICE — SUCTION CANISTER, 3000CC,SAFELINER: Brand: DEROYAL

## (undated) DEVICE — HYSTEROSCOPY: Brand: MEDLINE INDUSTRIES, INC.

## (undated) DEVICE — SUTURE MONOCRYL 4-0 Y845G

## (undated) DEVICE — UNDYED BRAIDED (POLYGLACTIN 910), SYNTHETIC ABSORBABLE SUTURE: Brand: COATED VICRYL

## (undated) DEVICE — DRAIN SUR W10MMXL20CM SIL FULL PERF HUBLESS

## (undated) DEVICE — FAST-FIX 360 CURVED MENISCAL                                    REPAIR SYSTEM
Type: IMPLANTABLE DEVICE | Site: KNEE | Status: NON-FUNCTIONAL
Brand: FAST-FIX
Removed: 2019-05-15

## (undated) DEVICE — SUT VCRL 0 L27IN ABSRB VLT L26MM UR-6 5/8-ZZDISC-USE 421016

## (undated) DEVICE — LAP CHOLE: Brand: MEDLINE INDUSTRIES, INC.

## (undated) DEVICE — GLOVE SUR 6.5 SENSICARE PI PIP GRN PWD F

## (undated) DEVICE — TROCAR: Brand: KII® SLEEVE

## (undated) DEVICE — KIT CLEAN ENDOKIT 1.1OZ GOWNX2

## (undated) DEVICE — DRAPE SRG 70X60IN SPLT U IMPRV

## (undated) DEVICE — SUTURE VICRYL 0 UR-6

## (undated) DEVICE — PLUMEPORT ACTIV LAPAROSCOPIC SMOKE FILTRATION DEVICE: Brand: PLUMEPORT ACTIVE

## (undated) DEVICE — MEDI-VAC NON-CONDUCTIVE SUCTION TUBING 6MM X 1.8M (6FT.) L: Brand: CARDINAL HEALTH

## (undated) DEVICE — TROCARS: Brand: KII® BALLOON BLUNT TIP SYSTEM

## (undated) DEVICE — CO2 CANNULA,SSOFT,ADLT,7O2,4CO2,FEMALE: Brand: MEDLINE

## (undated) DEVICE — SOLUTION  .9 1000ML BTL

## (undated) DEVICE — LAPAROSCOPY: Brand: MEDLINE INDUSTRIES, INC.

## (undated) DEVICE — MANIPULATOR CATH ESCP KRNR

## (undated) DEVICE — INSUFFLATION NEEDLE TO ESTABLISH PNEUMOPERITONEUM.: Brand: INSUFFLATION NEEDLE

## (undated) DEVICE — YANKAUER,BULB TIP,W/O VENT,RIGID,STERILE: Brand: MEDLINE

## (undated) DEVICE — 3M™ STERI-STRIP™ REINFORCED ADHESIVE SKIN CLOSURES, R1547, 1/2 IN X 4 IN (12 MM X 100 MM), 6 STRIPS/ENVELOPE: Brand: 3M™ STERI-STRIP™

## (undated) DEVICE — 3 ML SYRINGE LUER-LOCK TIP: Brand: MONOJECT

## (undated) DEVICE — SUT COAT VCRL+ 0 27IN UR-6 ABSRB VLT ANTIBACT

## (undated) DEVICE — BLADE SHVR COOLCUT 13CM 4MM

## (undated) DEVICE — V2 SPECIMEN COLLECTION MANIFOLD KIT: Brand: NEPTUNE

## (undated) DEVICE — Device

## (undated) DEVICE — SOLUTION IRRIG 3000ML 0.9% NACL FLX CONT

## (undated) DEVICE — MARYLAND JAW LAPAROSCOPIC SEALER/DIVIDER COATED: Brand: LIGASURE

## (undated) DEVICE — GLOVE SUR 6.5 SENSICARE PI MIC PIP CRM PWD F

## (undated) DEVICE — SOL  .9 1000ML BTL

## (undated) DEVICE — SOL  .9 3000ML

## (undated) DEVICE — SOLUTION IRRIG 1000ML 0.9% NACL USP BTL

## (undated) DEVICE — TUBING IRR 16FT CNT WV 3 ASCP

## (undated) DEVICE — TROCAR: Brand: KII SLEEVE

## (undated) DEVICE — ZIMMER® STERILE DISPOSABLE TOURNIQUET CUFF WITH PLC, DUAL PORT, SINGLE BLADDER, 34 IN. (86 CM)

## (undated) DEVICE — EVACUATOR SUR 100CC SIL BLB WND

## (undated) DEVICE — LASSO POLYPECTOMY SNARE: Brand: LASSO

## (undated) DEVICE — ENCORE® LATEX ACCLAIM SIZE 8.5, STERILE LATEX POWDER-FREE SURGICAL GLOVE: Brand: ENCORE

## (undated) DEVICE — ENCORE® LATEX MICRO SIZE 8.5, STERILE LATEX POWDER-FREE SURGICAL GLOVE: Brand: ENCORE

## (undated) DEVICE — CONMED SCOPE SAVER BITE BLOCK, 20X27 MM: Brand: SCOPE SAVER

## (undated) DEVICE — GIJAW SINGLE-USE BIOPSY FORCEPS WITH NEEDLE: Brand: GIJAW

## (undated) DEVICE — ARTHROSCOPY: Brand: MEDLINE INDUSTRIES, INC.

## (undated) DEVICE — ADHESIVE SKIN TOP FOR WND CLSR DERMBND ADV

## (undated) DEVICE — GOWN SURG AERO BLUE PERF XLG

## (undated) NOTE — LETTER
7/12/2024          Linda Minaya    [address]    [place] [address]         Dear Linda,    Our records indicate that the tests ordered for you by JADEN Newby  have not been done.  If you have, in fact, already completed the tests or you do not wish to have the tests done, please contact our office at THE NUMBER LISTED BELOW.  Otherwise, please proceed with the testing.  Enclosed is a duplicate order for your convenience.    Imaging Order:    US GALLBLADDER (CPT=76705) (Order #[id number]) on 4/30/24       Sincerely,    JADEN Newby  [facility]  [address]  [place] [id number] [phone]

## (undated) NOTE — LETTER
[facility]  [address], [place], [place]    Authorization for Surgical Operation and Procedure                               I hereby authorize NATALI Angeles MD, my physician and his/her assistants (if applicable), which may include medical students, residents, and/or fellows, to perform the following surgical operation/ procedure and administer such anesthesia as may be determined necessary by my physician: Operation/Procedure name (s) COLONOSCOPY on Linda Minaya   2.   I recognize that during the surgical operation/procedure, unforeseen conditions may necessitate additional or different procedures than those listed above.  I, therefore, further authorize and request that the above-named surgeon, assistants, or designees perform such procedures as are, in their judgment, necessary and desirable.    3.   My surgeon/physician has discussed prior to my surgery the potential benefits, risks and side effects of this procedure; the likelihood of achieving goals; and potential problems that might occur during recuperation.  They also discussed reasonable alternatives to the procedure, including risks, benefits, and side effects related to the alternatives and risks related to not receiving this procedure.  I have had all my questions answered and I acknowledge that no guarantee has been made as to the result that may be obtained.    4.   Should the need arise during my operation/procedure, which includes change of level of care prior to discharge, I also consent to the administration of blood and/or blood products.  Further, I understand that despite careful testing and screening of blood or blood products by collecting agencies, I may still be subject to ill effects as a result of receiving a blood transfusion and/or blood products.  The following are some, but not all, of the potential risks that can occur: fever and allergic reactions, hemolytic reactions, transmission of diseases such as  Hepatitis, AIDS and Cytomegalovirus (CMV) and fluid overload.  In the event that I wish to have an autologous transfusion of my own blood, or a directed donor transfusion, I will discuss this with my physician.  Check only if Refusing Blood or Blood Products  I understand refusal of blood or blood products as deemed necessary by my physician may have serious consequences to my condition to include possible death. I hereby assume responsibility for my refusal and release the hospital, its personnel, and my physicians from any responsibility for the consequences of my refusal.    o  Refuse   5.   I authorize the use of any specimen, organs, tissues, body parts or foreign objects that may be removed from my body during the operation/procedure for diagnosis, research or teaching purposes and their subsequent disposal by hospital authorities.  I also authorize the release of specimen test results and/or written reports to my treating physician on the hospital medical staff or other referring or consulting physicians involved in my care, at the discretion of the Pathologist or my treating physician.    6.   I consent to the photographing or videotaping of the operations or procedures to be performed, including appropriate portions of my body for medical, scientific, or educational purposes, provided my identity is not revealed by the pictures or by descriptive texts accompanying them.  If the procedure has been photographed/videotaped, the surgeon will obtain the original picture, image, videotape or CD.  The hospital will not be responsible for storage, release or maintenance of the picture, image, tape or CD.    7.   I consent to the presence of a  or observers in the operating room as deemed necessary by my physician or their designees.    8.   I recognize that in the event my procedure results in extended X-Ray/fluoroscopy time, I may develop a skin reaction.    9. If I have a Do Not Attempt  Resuscitation (DNAR) order in place, that status will be suspended while in the operating room, procedural suite, and during the recovery period unless otherwise explicitly stated by me (or a person authorized to consent on my behalf). The surgeon or my attending physician will determine when the applicable recovery period ends for purposes of reinstating the DNAR order.  10. Patients having a sterilization procedure: I understand that if the procedure is successful the results will be permanent and it will therefore be impossible for me to inseminate, conceive, or bear children.  I also understand that the procedure is intended to result in sterility, although the result has not been guaranteed.   11. I acknowledge that my physician has explained sedation/analgesia administration to me including the risk and benefits I consent to the administration of sedation/analgesia as may be necessary or desirable in the judgment of my physician.    I CERTIFY THAT I HAVE READ AND FULLY UNDERSTAND THE ABOVE CONSENT TO OPERATION and/or OTHER PROCEDURE.     ____________________________________  _________________________________        ______________________________  Signature of Patient    Signature of Responsible Person                Printed Name of Responsible Person                                      ____________________________________  _____________________________                ________________________________  Signature of Witness        Date  Time         Relationship to Patient    STATEMENT OF PHYSICIAN My signature below affirms that prior to the time of the procedure; I have explained to the patient and/or his/her legal representative, the risks and benefits involved in the proposed treatment and any reasonable alternative to the proposed treatment. I have also explained the risks and benefits involved in refusal of the proposed treatment and alternatives to the proposed treatment and have answered the patient's  questions. If I have a significant financial interest in a co-management agreement or a significant financial interest in any product or implant, or other significant relationship used in this procedure/surgery, I have disclosed this and had a discussion with my patient.     _____________________________________________________              _____________________________  (Signature of Physician)                                                                                         (Date)                                   (Time)  Patient Name: Linda Minaya      : 1980      Printed: 2025     Medical Record #: [id number]                                      Page 1 of 1

## (undated) NOTE — LETTER
6/26/2019              [address] [id number]         To Whom It May Concern,    Yahir Larios is currently under my medical care.   Idania Workman may return to work on 7/8/19 with the following restrictions:  Se

## (undated) NOTE — LETTER
02/25/21      [address]  [address] [facility] [address]     Dear  Godwin [place] ,  Please call or schedule on [facility] a sleep consult appointment with Dr. Ayah Hensley or JADEN Banerjee. The office number is [phone]. Thank you!         Sl

## (undated) NOTE — Clinical Note
Thank you for the consult. I saw Ms. Wendy Owens in the endocrine/diabetes clinic today via a virtual visit. Please see attached my note. Please feel free to contact me with any questions. Thanks!

## (undated) NOTE — LETTER
[facility]  [address], [place], [place]    Authorization for Surgical Operation and Procedure                               I hereby authorize NATALI Angeles MD, my physician and his/her assistants (if applicable), which may include medical students, residents, and/or fellows, to perform the following surgical operation/ procedure and administer such anesthesia as may be determined necessary by my physician: Operation/Procedure name (s) COLONOSCOPY/ ESOPHAGOGASTRODUODENOSCOPY on Linda Minaya   2.   I recognize that during the surgical operation/procedure, unforeseen conditions may necessitate additional or different procedures than those listed above.  I, therefore, further authorize and request that the above-named surgeon, assistants, or designees perform such procedures as are, in their judgment, necessary and desirable.    3.   My surgeon/physician has discussed prior to my surgery the potential benefits, risks and side effects of this procedure; the likelihood of achieving goals; and potential problems that might occur during recuperation.  They also discussed reasonable alternatives to the procedure, including risks, benefits, and side effects related to the alternatives and risks related to not receiving this procedure.  I have had all my questions answered and I acknowledge that no guarantee has been made as to the result that may be obtained.    4.   Should the need arise during my operation/procedure, which includes change of level of care prior to discharge, I also consent to the administration of blood and/or blood products.  Further, I understand that despite careful testing and screening of blood or blood products by collecting agencies, I may still be subject to ill effects as a result of receiving a blood transfusion and/or blood products.  The following are some, but not all, of the potential risks that can occur: fever and allergic reactions, hemolytic reactions,  transmission of diseases such as Hepatitis, AIDS and Cytomegalovirus (CMV) and fluid overload.  In the event that I wish to have an autologous transfusion of my own blood, or a directed donor transfusion, I will discuss this with my physician.  Check only if Refusing Blood or Blood Products  I understand refusal of blood or blood products as deemed necessary by my physician may have serious consequences to my condition to include possible death. I hereby assume responsibility for my refusal and release the hospital, its personnel, and my physicians from any responsibility for the consequences of my refusal.    o  Refuse   5.   I authorize the use of any specimen, organs, tissues, body parts or foreign objects that may be removed from my body during the operation/procedure for diagnosis, research or teaching purposes and their subsequent disposal by hospital authorities.  I also authorize the release of specimen test results and/or written reports to my treating physician on the hospital medical staff or other referring or consulting physicians involved in my care, at the discretion of the Pathologist or my treating physician.    6.   I consent to the photographing or videotaping of the operations or procedures to be performed, including appropriate portions of my body for medical, scientific, or educational purposes, provided my identity is not revealed by the pictures or by descriptive texts accompanying them.  If the procedure has been photographed/videotaped, the surgeon will obtain the original picture, image, videotape or CD.  The hospital will not be responsible for storage, release or maintenance of the picture, image, tape or CD.    7.   I consent to the presence of a  or observers in the operating room as deemed necessary by my physician or their designees.    8.   I recognize that in the event my procedure results in extended X-Ray/fluoroscopy time, I may develop a skin reaction.    9. If  I have a Do Not Attempt Resuscitation (DNAR) order in place, that status will be suspended while in the operating room, procedural suite, and during the recovery period unless otherwise explicitly stated by me (or a person authorized to consent on my behalf). The surgeon or my attending physician will determine when the applicable recovery period ends for purposes of reinstating the DNAR order.  10. Patients having a sterilization procedure: I understand that if the procedure is successful the results will be permanent and it will therefore be impossible for me to inseminate, conceive, or bear children.  I also understand that the procedure is intended to result in sterility, although the result has not been guaranteed.   11. I acknowledge that my physician has explained sedation/analgesia administration to me including the risk and benefits I consent to the administration of sedation/analgesia as may be necessary or desirable in the judgment of my physician.    I CERTIFY THAT I HAVE READ AND FULLY UNDERSTAND THE ABOVE CONSENT TO OPERATION and/or OTHER PROCEDURE.     ____________________________________  _________________________________        ______________________________  Signature of Patient    Signature of Responsible Person                Printed Name of Responsible Person                                      ____________________________________  _____________________________                ________________________________  Signature of Witness        Date  Time         Relationship to Patient    STATEMENT OF PHYSICIAN My signature below affirms that prior to the time of the procedure; I have explained to the patient and/or his/her legal representative, the risks and benefits involved in the proposed treatment and any reasonable alternative to the proposed treatment. I have also explained the risks and benefits involved in refusal of the proposed treatment and alternatives to the proposed treatment and have  answered the patient's questions. If I have a significant financial interest in a co-management agreement or a significant financial interest in any product or implant, or other significant relationship used in this procedure/surgery, I have disclosed this and had a discussion with my patient.     _____________________________________________________              _____________________________  (Signature of Physician)                                                                                         (Date)                                   (Time)  Patient Name: Linda HURST Rei      : 1980      Printed: 2024     Medical Record #: [id number]                                      Page 1 of 1

## (undated) NOTE — ED AVS SNAPSHOT
[facility] Emergency Department    [address]     [place] [address]    Phone:  [phone]    Fax:  Jaironmeseret SHIRABernadette Yao.   MRN: [id number]    Department:  [facility] Emergency Department   Date of Visit:  6/3/2 and Class Registration line at [phone] or find a doctor online by visiting [email or web address].    IF THERE IS ANY CHANGE OR WORSENING OF YOUR CONDITION, CALL YOUR PRIMARY CARE PHYSICIAN AT ONCE OR RETURN IMMEDIATELY TO [address] [place] [address].     If

## (undated) NOTE — LETTER
[address] [place] [address] Rd, [place], [place]     AUTHORIZATION FOR SURGICAL OPERATION OR PROCEDURE    I hereby authorize Dr. Roxanna Kauffman, my Physician(s) and whomever may be designated as the doctor's Assistant, to perform the following ope 4. I consent to the photographing of procedure(s) to be performed for the purposes of advancing medicine, science and/or education, provided my identity is not revealed.  If the procedure has been videotaped, the physician/surgeon will obtain the original v (Witness signature)                                                                                                  (Date)                                (Time)  STATEMENT OF PHYSICIAN My signature below affirms that prior to the time of the procedure;  I

## (undated) NOTE — ED AVS SNAPSHOT
[facility] Emergency Department    [address]     [place] [address]    Phone:  [phone]    Fax:  Shahnaz FOX Maximilian.   MRN: [id number]    Department:  [facility] Emergency Department   Date of Visit:  6/3/2 coverage and benefits available for follow-up care and referrals. If you have difficulty scheduling your follow-up appointment as directed, please call our  at [phone].      Si tiene problemas para programar oralia bertha de seguimiento s different from what your Primary Care doctor has instructed you - please continue to take your medications as instructed by your Primary Care doctor until you can check with your doctor. Please bring the medication list to your next doctor's appointment. can help with your Affordable Care Act coverage, as well as all types of Medicaid plans. To get signed up and covered, please call [phone] and ask to get set up for an insurance coverage that is in-network with [facility]

## (undated) NOTE — ED AVS SNAPSHOT
Nadia Poster   MRN: [id number]    Department:  [facility] Emergency Department   Date of Visit:  4/15/2018           Disclosure     Insurance plans vary and the physician(s) referred by the ER may not be covered by your plan.  Please contact y CARE PHYSICIAN AT ONCE OR RETURN IMMEDIATELY TO THE EMERGENCY DEPARTMENT. If you have been prescribed any medication(s), please fill your prescription right away and begin taking the medication(s) as directed.   If you believe that any of the medications

## (undated) NOTE — ED AVS SNAPSHOT
Lisa Hernandez   MRN: [id number]    Department:  [facility] Emergency Department   Date of Visit:  2/7/2019           Disclosure     Insurance plans vary and the physician(s) referred by the ER may not be covered by your plan.  Please contact yo CARE PHYSICIAN AT ONCE OR RETURN IMMEDIATELY TO THE EMERGENCY DEPARTMENT. If you have been prescribed any medication(s), please fill your prescription right away and begin taking the medication(s) as directed.   If you believe that any of the medications

## (undated) NOTE — LETTER
5/6/2025    Linda Minaya  [address]  [place] [address]    Dear Linda,    Due to a missed appointment on 05/06/2025, your account has been charged $50.    No-show policy    A $50 fee will be charged for missed appointments.    To accommodate all our patients, we require at least 24 hours' notice if you need to cancel or reschedule your appointment.    If three appointments are missed within a 12-month period, we may begin the dismissal process for future care at [facility].    We value the relationship we have established with you.  We hope to continue to serve your health care needs.      Sincerely,    [facility]

## (undated) NOTE — LETTER
ASTHMA ACTION PLAN for Lorenzo Waggoner     : 1980     Date: 21  Doctor:  Uri Grover MD  Phone for doctor or clinic: [address], [facility]  [address], [facility] [address]  [address] [facility]  [phone] ONE hour -  go to the emergency room or call 911 immediately! If symptoms improve, call office for appointment immediately.     Albuterol inhaler 2 puffs every 20 minutes for three treatments       Don't forget:  · Rinse mouth after using inhaler  · Use spa

## (undated) NOTE — LETTER
AUTHORIZATION FOR SURGICAL OPERATION OR OTHER PROCEDURE    1.  I hereby authorize Dr. Maye Ayoub  and the [facility] staff assigned to my case to perform the following operation and/or procedure at the [facility] Office:    LEFT patella tendon insertion prolothera Relationship to Patient:           []  Parent    Responsible person                          []  Spouse  In case of minor or                    [] Other  _____________   Incompetent name:  __________________________________________________

## (undated) NOTE — LETTER
6/18/2019          To Whom It May Concern:    Conception Stalker is currently under my medical care. She may return to work in 2 weeks with the following restrictions: sedentary position for 6 weeks.      If you require additional information please contact ou

## (undated) NOTE — ED AVS SNAPSHOT
Lan Herrera   MRN: [id number]    Department:  [facility] Emergency Department   Date of Visit:  11/30/2018           Disclosure     Insurance plans vary and the physician(s) referred by the ER may not be covered by your plan.  Please contact CARE PHYSICIAN AT ONCE OR RETURN IMMEDIATELY TO THE EMERGENCY DEPARTMENT. If you have been prescribed any medication(s), please fill your prescription right away and begin taking the medication(s) as directed.   If you believe that any of the medications

## (undated) NOTE — LETTER
[facility]  Administration of Anesthesia  RIVERALinda agree to be cared for by a physician anesthesiologist alone and/or with a nurse anesthetist, who is specially trained to monitor me and give me medicine to put me to sleep or keep me comfortable during my procedure    I understand that my anesthesiologist and/or anesthetist is not an employee or agent of [facility] or [employer]. He or she works for [facility]    As the patient asking for anesthesia services, I agree to:  Allow the anesthesiologist (anesthesia doctor) to give me medicine and do additional procedures as necessary. Some examples are: Starting or using an “IV” to give me medicine, fluids or blood during my procedure, and having a breathing tube placed to help me breathe when I’m asleep (intubation). In the event that my heart stops working properly, I understand that my anesthesiologist will make every effort to sustain my life, unless otherwise directed by [facility] Do Not Resuscitate documents.  Tell my anesthesia doctor before my procedure:  If I am pregnant.  The last time that I ate or drank.  iii. All of the medicines I take (including prescriptions, herbal supplements, and pills I can buy without a prescription (including street drugs/illegal medications). Failure to inform my anesthesiologist about these medicines may increase my risk of anesthetic complications.  iv.If I am allergic to anything or have had a reaction to anesthesia before.  I understand how the anesthesia medicine will help me (benefits).  I understand that with any type of anesthesia medicine there are risks:  The most common risks are: nausea, vomiting, sore throat, muscle soreness, damage to my eyes, mouth, or teeth (from breathing tube placement).  Rare risks include: remembering what happened during my procedure, allergic reactions to medications, injury to my airway, heart, lungs, vision, nerves, or  muscles and in extremely rare instances death.  My doctor has explained to me other choices available to me for my care (alternatives).  Pregnant Patients (“epidural”):  I understand that the risks of having an epidural (medicine given into my back to help control pain during labor), include itching, low blood pressure, difficulty urinating, headache or slowing of the baby’s heart. Very rare risks include infection, bleeding, seizure, irregular heart rhythms and nerve injury.  Regional Anesthesia (“spinal”, “epidural”, & “nerve blocks”):  I understand that rare but potential complications include headache, bleeding, infection, seizure, irregular heart rhythms, and nerve injury.    _____________________________________________________________________________  Patient (or Representative) Signature/Relationship to Patient  Date   Time    _____________________________________________________________________________   Name (if used)    Language/Organization   Time    _____________________________________________________________________________  Nurse Anesthetist Signature     Date   Time  _____________________________________________________________________________  Anesthesiologist Signature     Date   Time  I have discussed the procedure and information above with the patient (or patient’s representative) and answered their questions. The patient or their representative has agreed to have anesthesia services.    _____________________________________________________________________________  Witness        Date   Time  I have verified that the signature is that of the patient or patient’s representative, and that it was signed before the procedure  Patient Name: Linda Minaya     : 1980                 Printed: 2024 at 9:04 AM    Medical Record #: [id number]                                            Page 1 of 1  ----------ANESTHESIA CONSENT----------

## (undated) NOTE — LETTER
Patient Name: Faustina Carmichael  YOB: 1980          MRN number:  [id number]  Date:  8/11/2023  Referring Physician: Indira Jordan       ADULT VIDEOFLUOROSCOPIC SWALLOWING STUDY       ADULT VIDEOFLUOROSCOPIC SWALLOWING STUDY:   Referring Physician: Steve Rowan      Radiologist: Dr. Mahoney December  Diagnosis: dysphagia    Date of Service: 8/11/2023     PATIENT SUMMARY   Chief Complaint: Pt c/o gagging and food coming back up into her mouth from her throat several times a week for the past 6-9 months. She coughs and chokes with both food and liquid and feels worn out and has shortness of breath during/after the episodes. Symptoms are worse with carbonated liquids and bread products. She denies weight loss and odynophagia, but notes discomfort when it occurs. Pt/ENT are considering thyroid surgery and patient has had several recent appointments. Esophagram showed small hiatal hernia and no penetration or aspiration. US showed thyroid nodules. See below. Current Diet: Soft, moist foods and liquids       Problem List  Active Problems:  Active Problems:    * No active hospital problems. *      Past Medical History  Past Medical History:   Diagnosis Date    Anesthesia complication     increase heart rate after anesthesia    Arrhythmia     Asthma     Back problem     Disorder of thyroid     thyroid nodules    Esophageal reflux     Essential hypertension     High blood pressure     Migraines     Osteoarthritis     Palpitations     Visual impairment     wears glasses        Imaging results: no recent CXR    4/28/23 esophagram:  1. No penetration or aspiration. 2. Small hiatal hernia and minimal reflux demonstrated during Valsalva. 5/28/23 US Head and neck:  Bilateral thyroid nodules compatible with nodular goiter. Stable appearance of nodules throughout the left lobe.   Previously demonstrated right lobe nodule is not clearly redemonstrated on this exam.  Largest nodule is 17 x 11 x 17 mm and has TIRADS 4 criteria. FNA of this nodule was performed 9/12/18, and demonstrated demonstrating benign follicular nodule. ASSESSMENT   DYSPHAGIA ASSESSMENT  Test completed in conjunction with Radiologist.   Food/Liquid Types Presented: puree, solid, and thin liquids. Study Position and View:  Patient was seated upright and viewed laterally. A-P was not viewed as patient had recent esophagram.    Pain Assessment: The patient reports pain at a level of 0/10. Oral phase:  Reduced bolus control and containment resulted in partial premature spillage of all consistencies into the pharynx prior to the swallow. Puree consistency was swallowed in piecemeal fashion with the second swallow clearing all residual.  No oral residue remained with solid or liquid consistencies. Pharyngeal phase:  The pharyngeal response typically triggered at the tongue base, but after part of bolus entered the pharynx. Thin liquids partially spilled to the valleculae and pyriform sinuses. Puree and solids partially fell prematurely to the valleculae. No penetration or aspiration was observed across consistencies as epiglottic inversion and laryngeal closure were adequate. Good base of tongue retraction and hyolaryngeal excursion resulted in no pharyngeal retention. Esophageal phase:   Adequate flow of bolus through upper esophagus     Penetration Aspiration Scale: 1/8. Material does not enter the airway. Overall Impression: Essentially normal/functional swallow with no laryngeal penetration or aspiration, however, pt is at minimal risk due to partial premature spillage of all consistencies prior to the swallow. No therapy is recommended at this time. Pt should complete lingual exercises on her own. FCM category and level: Swallowing, 7  PLAN   Potential: Good    Diet Recommendations:  Solids: Regular  Liquids:  Thin    Recommended compensatory strategies:   Sit upright    Medication Administration:  No restrictions    Further Follow-up:  Pt was provided with lingual control exercises and effortful swallow to do independently. If symptoms persist, patient may benefit from short term therapy. EDUCATION/INSTRUCTION  Reviewed results and recommendations with patient. Written instructions were provided. Agreement/Understanding verbalized and all questions answered to their apparent satisfaction. INTERDISCIPLINARY COMMUNICATION  Reviewed results with Radiologist; agreement verbalized. Thank you for your referral.  If you have any questions, please contact me at [phone].     Orly Benjamin MA/[facility]-SLP  Speech Language Pathologist  [address] [place] [address]  [phone]    Electronically signed by therapist: DEBBY Lo  Physician's certification required: No

## (undated) NOTE — LETTER
7/23/2020              [facility] [id number]         Dear Austin Allison,    Our records indicate that the tests ordered for you by Kay Putnam. Josue King MD  have not been done.   If you have, in fact, already comp

## (undated) NOTE — LETTER
[facility]  Administration of Anesthesia  RIVERALinda agree to be cared for by a physician anesthesiologist alone and/or with a nurse anesthetist, who is specially trained to monitor me and give me medicine to put me to sleep or keep me comfortable during my procedure    I understand that my anesthesiologist and/or anesthetist is not an employee or agent of [facility] or [employer]. He or she works for [facility]    As the patient asking for anesthesia services, I agree to:  Allow the anesthesiologist (anesthesia doctor) to give me medicine and do additional procedures as necessary. Some examples are: Starting or using an “IV” to give me medicine, fluids or blood during my procedure, and having a breathing tube placed to help me breathe when I’m asleep (intubation). In the event that my heart stops working properly, I understand that my anesthesiologist will make every effort to sustain my life, unless otherwise directed by [facility] Do Not Resuscitate documents.  Tell my anesthesia doctor before my procedure:  If I am pregnant.  The last time that I ate or drank.  iii. All of the medicines I take (including prescriptions, herbal supplements, and pills I can buy without a prescription (including street drugs/illegal medications). Failure to inform my anesthesiologist about these medicines may increase my risk of anesthetic complications.  iv.If I am allergic to anything or have had a reaction to anesthesia before.  I understand how the anesthesia medicine will help me (benefits).  I understand that with any type of anesthesia medicine there are risks:  The most common risks are: nausea, vomiting, sore throat, muscle soreness, damage to my eyes, mouth, or teeth (from breathing tube placement).  Rare risks include: remembering what happened during my procedure, allergic reactions to medications, injury to my airway, heart, lungs, vision, nerves, or  muscles and in extremely rare instances death.  My doctor has explained to me other choices available to me for my care (alternatives).  Pregnant Patients (“epidural”):  I understand that the risks of having an epidural (medicine given into my back to help control pain during labor), include itching, low blood pressure, difficulty urinating, headache or slowing of the baby’s heart. Very rare risks include infection, bleeding, seizure, irregular heart rhythms and nerve injury.  Regional Anesthesia (“spinal”, “epidural”, & “nerve blocks”):  I understand that rare but potential complications include headache, bleeding, infection, seizure, irregular heart rhythms, and nerve injury.    _____________________________________________________________________________  Patient (or Representative) Signature/Relationship to Patient  Date   Time    _____________________________________________________________________________   Name (if used)    Language/Organization   Time    _____________________________________________________________________________  Nurse Anesthetist Signature     Date   Time  _____________________________________________________________________________  Anesthesiologist Signature     Date   Time  I have discussed the procedure and information above with the patient (or patient’s representative) and answered their questions. The patient or their representative has agreed to have anesthesia services.    _____________________________________________________________________________  Witness        Date   Time  I have verified that the signature is that of the patient or patient’s representative, and that it was signed before the procedure  Patient Name: Linda Minaya     : 1980                 Printed: 2025 at 9:51 AM    Medical Record #: [id number]                                            Page 1 of 1  ----------ANESTHESIA CONSENT----------